# Patient Record
Sex: FEMALE | Race: WHITE | NOT HISPANIC OR LATINO
[De-identification: names, ages, dates, MRNs, and addresses within clinical notes are randomized per-mention and may not be internally consistent; named-entity substitution may affect disease eponyms.]

---

## 2017-05-19 ENCOUNTER — APPOINTMENT (OUTPATIENT)
Dept: INTERNAL MEDICINE | Facility: CLINIC | Age: 67
End: 2017-05-19

## 2017-05-19 VITALS
DIASTOLIC BLOOD PRESSURE: 70 MMHG | BODY MASS INDEX: 26.67 KG/M2 | WEIGHT: 176 LBS | HEART RATE: 70 BPM | SYSTOLIC BLOOD PRESSURE: 134 MMHG | RESPIRATION RATE: 16 BRPM | HEIGHT: 68 IN

## 2017-05-19 DIAGNOSIS — I34.1 NONRHEUMATIC MITRAL (VALVE) PROLAPSE: ICD-10-CM

## 2017-09-19 ENCOUNTER — MEDICATION RENEWAL (OUTPATIENT)
Age: 67
End: 2017-09-19

## 2017-11-28 ENCOUNTER — OTHER (OUTPATIENT)
Age: 67
End: 2017-11-28

## 2018-02-05 ENCOUNTER — APPOINTMENT (OUTPATIENT)
Dept: INTERNAL MEDICINE | Facility: CLINIC | Age: 68
End: 2018-02-05
Payer: MEDICARE

## 2018-02-05 DIAGNOSIS — Z23 ENCOUNTER FOR IMMUNIZATION: ICD-10-CM

## 2018-02-05 PROCEDURE — G0008: CPT

## 2018-02-05 PROCEDURE — 90686 IIV4 VACC NO PRSV 0.5 ML IM: CPT

## 2018-02-22 ENCOUNTER — APPOINTMENT (OUTPATIENT)
Dept: INTERNAL MEDICINE | Facility: CLINIC | Age: 68
End: 2018-02-22

## 2018-02-23 ENCOUNTER — LABORATORY RESULT (OUTPATIENT)
Age: 68
End: 2018-02-23

## 2018-02-23 LAB
ALBUMIN SERPL ELPH-MCNC: 4.6 G/DL
ALP BLD-CCNC: 72 U/L
ALT SERPL-CCNC: 24 U/L
ANION GAP SERPL CALC-SCNC: 13 MMOL/L
AST SERPL-CCNC: 19 U/L
BASOPHILS # BLD AUTO: 0.19 K/UL
BASOPHILS NFR BLD AUTO: 2.6 %
BILIRUB SERPL-MCNC: 0.6 MG/DL
BUN SERPL-MCNC: 13 MG/DL
CALCIUM SERPL-MCNC: 9.8 MG/DL
CHLORIDE SERPL-SCNC: 90 MMOL/L
CHOLEST SERPL-MCNC: 225 MG/DL
CHOLEST/HDLC SERPL: 3.4 RATIO
CO2 SERPL-SCNC: 28 MMOL/L
CREAT SERPL-MCNC: 0.68 MG/DL
EOSINOPHIL # BLD AUTO: 0.13 K/UL
EOSINOPHIL NFR BLD AUTO: 1.8
GLUCOSE SERPL-MCNC: 91 MG/DL
HBA1C MFR BLD HPLC: 5.7 %
HCT VFR BLD CALC: 33.8 %
HDLC SERPL-MCNC: 67 MG/DL
HGB BLD-MCNC: 11 G/DL
LDLC SERPL CALC-MCNC: 137 MG/DL
LYMPHOCYTES # BLD AUTO: 1.28 K/UL
LYMPHOCYTES NFR BLD AUTO: 17.5 %
MAN DIFF?: NORMAL
MCHC RBC-ENTMCNC: 19.3 PG
MCHC RBC-ENTMCNC: 32.5 GM/DL
MCV RBC AUTO: 59.3 FL
MONOCYTES # BLD AUTO: 0.45 K/UL
MONOCYTES NFR BLD AUTO: 6.1 %
NEUTROPHILS # BLD AUTO: 5.25 K/UL
NEUTROPHILS NFR BLD AUTO: 71.9 %
PLATELET # BLD AUTO: 401 K/UL
POTASSIUM SERPL-SCNC: 4.3 MMOL/L
PROT SERPL-MCNC: 7.4 G/DL
RBC # BLD: 5.7 M/UL
RBC # FLD: 15.9 %
SODIUM SERPL-SCNC: 131 MMOL/L
TRIGL SERPL-MCNC: 103 MG/DL
TSH SERPL-ACNC: 2.04 UIU/ML
WBC # FLD AUTO: 7.3 K/UL

## 2018-02-26 LAB — 25(OH)D3 SERPL-MCNC: 44.5 NG/ML

## 2018-03-29 ENCOUNTER — MEDICATION RENEWAL (OUTPATIENT)
Age: 68
End: 2018-03-29

## 2018-04-10 ENCOUNTER — TRANSCRIPTION ENCOUNTER (OUTPATIENT)
Age: 68
End: 2018-04-10

## 2018-06-29 ENCOUNTER — RX RENEWAL (OUTPATIENT)
Age: 68
End: 2018-06-29

## 2018-12-06 ENCOUNTER — APPOINTMENT (OUTPATIENT)
Dept: INTERNAL MEDICINE | Facility: CLINIC | Age: 68
End: 2018-12-06

## 2018-12-31 ENCOUNTER — RX RENEWAL (OUTPATIENT)
Age: 68
End: 2018-12-31

## 2019-01-09 ENCOUNTER — OTHER (OUTPATIENT)
Age: 69
End: 2019-01-09

## 2019-01-31 ENCOUNTER — APPOINTMENT (OUTPATIENT)
Dept: INTERNAL MEDICINE | Facility: CLINIC | Age: 69
End: 2019-01-31
Payer: MEDICARE

## 2019-01-31 VITALS
WEIGHT: 175 LBS | RESPIRATION RATE: 16 BRPM | HEART RATE: 68 BPM | BODY MASS INDEX: 26.52 KG/M2 | HEIGHT: 68 IN | TEMPERATURE: 98.1 F | OXYGEN SATURATION: 99 %

## 2019-01-31 VITALS — SYSTOLIC BLOOD PRESSURE: 142 MMHG | DIASTOLIC BLOOD PRESSURE: 64 MMHG

## 2019-01-31 PROCEDURE — 99497 ADVNCD CARE PLAN 30 MIN: CPT

## 2019-01-31 PROCEDURE — G0439: CPT

## 2019-01-31 NOTE — HEALTH RISK ASSESSMENT
[Very Good] : ~his/her~  mood as very good [No falls in past year] : Patient reported no falls in the past year [Patient reported PAP Smear was normal] : Patient reported PAP Smear was normal [Patient reported bone density results were normal] : Patient reported bone density results were normal [Patient reported colonoscopy was normal] : Patient reported colonoscopy was normal [HIV test declined] : HIV test declined [Hepatitis C test declined] : Hepatitis C test declined [Change in mental status noted] : Change in mental status noted [None] : None [With Family] : lives with family [Retired] : retired [] :  [# Of Children ___] : has [unfilled] children [Sexually Active] : sexually active [High Risk Behavior] : high risk behavior [Feels Safe at Home] : Feels safe at home [Fully functional (bathing, dressing, toileting, transferring, walking, feeding)] : Fully functional (bathing, dressing, toileting, transferring, walking, feeding) [Fully functional (using the telephone, shopping, preparing meals, housekeeping, doing laundry, using] : Fully functional and needs no help or supervision to perform IADLs (using the telephone, shopping, preparing meals, housekeeping, doing laundry, using transportation, managing medications and managing finances) [Smoke Detector] : smoke detector [Carbon Monoxide Detector] : carbon monoxide detector [Safety elements used in home] : safety elements used in home [Seat Belt] :  uses seat belt [Sunscreen] : uses sunscreen [Patient declined discussion] : Patient declined discussion [Designated Healthcare Proxy] : Designated healthcare proxy [Name: ___] : Health Care Proxy's Name: [unfilled]  [Relationship: ___] : Relationship: [unfilled] [Aggressive treatment] : aggressive treatment [FreeTextEntry1] : had a treeible cold recently   [] : No [Language] : denies difficulty with language [Handling Complex Tasks] : denies difficulty handling complex tasks [Reports changes in hearing] : Reports no changes in hearing [Reports changes in vision] : Reports no changes in vision [Reports changes in dental health] : Reports no changes in dental health [Guns at Home] : no guns at home [Travel to Developing Areas] : does not  travel to developing areas [TB Exposure] : is not being exposed to tuberculosis [Caregiver Concerns] : does not have caregiver concerns [PapSmearDate] : 1/3/19 [PapSmearComments] : vainal sonogram done as well [facility] in [place] [BoneDensityDate] : 2014 [ColonoscopyDate] : 2015 [ColonoscopyComments] : SCHEDULED MAY THIS YEAR DR MOSS [FreeTextEntry4] : Goals of care conversation on this visit also discussed situation with patient that in the event they suffer an injury or accident where there is little chance for meaningful life, the patient states that they do NOT want to be maintained in this state ("as a vegetable"), and that there should be NO LIFE SUSTAINING MEASURES in this instance\par This includes example of being brain dead, not be be maintained on mechanical ventilation, and no feeding tubes\par \par Other treatments in cases where there IS a chance for meaningful recovery that were deemed acceptable to the patient include\par -hospializaton for most conditions including treatment for typical community acquired medical conditions such as pneumonia, heart related issues, GI Issues, etc. \par -IV fluids\par -blood transfusions\par -antibiotics\par \par

## 2019-01-31 NOTE — HISTORY OF PRESENT ILLNESS
[FreeTextEntry1] : Medicare annual  [de-identified] : had arecent bad URI, babysitting grandkids, cough sore throat congestion, mild achiness

## 2019-02-07 ENCOUNTER — APPOINTMENT (OUTPATIENT)
Dept: INTERNAL MEDICINE | Facility: CLINIC | Age: 69
End: 2019-02-07
Payer: MEDICARE

## 2019-02-07 PROCEDURE — 90662 IIV NO PRSV INCREASED AG IM: CPT

## 2019-02-07 PROCEDURE — G0008: CPT

## 2019-03-12 ENCOUNTER — TRANSCRIPTION ENCOUNTER (OUTPATIENT)
Age: 69
End: 2019-03-12

## 2019-03-20 ENCOUNTER — TRANSCRIPTION ENCOUNTER (OUTPATIENT)
Age: 69
End: 2019-03-20

## 2019-07-11 ENCOUNTER — RX RENEWAL (OUTPATIENT)
Age: 69
End: 2019-07-11

## 2019-10-21 ENCOUNTER — MEDICATION RENEWAL (OUTPATIENT)
Age: 69
End: 2019-10-21

## 2019-12-01 ENCOUNTER — FORM ENCOUNTER (OUTPATIENT)
Age: 69
End: 2019-12-01

## 2019-12-02 ENCOUNTER — APPOINTMENT (OUTPATIENT)
Dept: INTERNAL MEDICINE | Facility: CLINIC | Age: 69
End: 2019-12-02
Payer: MEDICARE

## 2019-12-02 ENCOUNTER — APPOINTMENT (OUTPATIENT)
Dept: RADIOLOGY | Facility: HOSPITAL | Age: 69
End: 2019-12-02
Payer: MEDICARE

## 2019-12-02 ENCOUNTER — OUTPATIENT (OUTPATIENT)
Dept: OUTPATIENT SERVICES | Facility: HOSPITAL | Age: 69
LOS: 1 days | End: 2019-12-02
Payer: MEDICARE

## 2019-12-02 VITALS
BODY MASS INDEX: 26.22 KG/M2 | HEIGHT: 68 IN | WEIGHT: 173 LBS | OXYGEN SATURATION: 99 % | TEMPERATURE: 97.5 F | HEART RATE: 101 BPM | RESPIRATION RATE: 17 BRPM

## 2019-12-02 VITALS — DIASTOLIC BLOOD PRESSURE: 78 MMHG | SYSTOLIC BLOOD PRESSURE: 136 MMHG

## 2019-12-02 DIAGNOSIS — J20.9 ACUTE BRONCHITIS, UNSPECIFIED: ICD-10-CM

## 2019-12-02 DIAGNOSIS — Z87.09 PERSONAL HISTORY OF OTHER DISEASES OF THE RESPIRATORY SYSTEM: ICD-10-CM

## 2019-12-02 DIAGNOSIS — M19.90 UNSPECIFIED OSTEOARTHRITIS, UNSPECIFIED SITE: ICD-10-CM

## 2019-12-02 PROCEDURE — 71046 X-RAY EXAM CHEST 2 VIEWS: CPT

## 2019-12-02 PROCEDURE — 99214 OFFICE O/P EST MOD 30 MIN: CPT

## 2019-12-02 PROCEDURE — 71046 X-RAY EXAM CHEST 2 VIEWS: CPT | Mod: 26

## 2019-12-02 NOTE — PHYSICAL EXAM
[No Acute Distress] : no acute distress [Well Nourished] : well nourished [Well-Appearing] : well-appearing [Well Developed] : well developed [Normal Sclera/Conjunctiva] : normal sclera/conjunctiva [Normal Outer Ear/Nose] : the outer ears and nose were normal in appearance [PERRL] : pupils equal round and reactive to light [EOMI] : extraocular movements intact [Normal Oropharynx] : the oropharynx was normal [No JVD] : no jugular venous distention [No Lymphadenopathy] : no lymphadenopathy [Supple] : supple [Thyroid Normal, No Nodules] : the thyroid was normal and there were no nodules present [No Respiratory Distress] : no respiratory distress  [No Accessory Muscle Use] : no accessory muscle use [Normal Rate] : normal rate  [Regular Rhythm] : with a regular rhythm [Normal S1, S2] : normal S1 and S2 [No Murmur] : no murmur heard [No Carotid Bruits] : no carotid bruits [No Abdominal Bruit] : a ~M bruit was not heard ~T in the abdomen [No Varicosities] : no varicosities [Pedal Pulses Present] : the pedal pulses are present [No Palpable Aorta] : no palpable aorta [No Edema] : there was no peripheral edema [No Extremity Clubbing/Cyanosis] : no extremity clubbing/cyanosis [Non Tender] : non-tender [Soft] : abdomen soft [Non-distended] : non-distended [No Masses] : no abdominal mass palpated [No HSM] : no HSM [Normal Bowel Sounds] : normal bowel sounds [Normal Posterior Cervical Nodes] : no posterior cervical lymphadenopathy [Normal Anterior Cervical Nodes] : no anterior cervical lymphadenopathy [No Spinal Tenderness] : no spinal tenderness [No CVA Tenderness] : no CVA  tenderness [No Joint Swelling] : no joint swelling [Grossly Normal Strength/Tone] : grossly normal strength/tone [Coordination Grossly Intact] : coordination grossly intact [No Rash] : no rash [Normal Gait] : normal gait [No Focal Deficits] : no focal deficits [Normal Affect] : the affect was normal [Deep Tendon Reflexes (DTR)] : deep tendon reflexes were 2+ and symmetric [Normal Insight/Judgement] : insight and judgment were intact [de-identified] : wheezes right LL

## 2019-12-02 NOTE — HISTORY OF PRESENT ILLNESS
[Moderate] : moderate [___ Weeks ago] :  [unfilled] weeks ago [Episodic] : episodic  [Congestion] : congestion [Cough] : cough [Sore Throat] : sore throat [Wheezing] : wheezing [NSAIDs] : NSAIDs [Worsening] : worsening [Chills] : no chills [Anorexia] : no anorexia [Shortness Of Breath] : no shortness of breath [Fatigue] : not fatigue [Headache] : no headache [Fever] : no fever [FreeTextEntry8] : squeaking at night lying down\par when takes a deep breath congested\par cough was keeping her up at night\par -took robitussin DM some relief\par -using steam \par \par Also asking about knee pain and CBD

## 2020-01-14 ENCOUNTER — APPOINTMENT (OUTPATIENT)
Dept: INTERNAL MEDICINE | Facility: CLINIC | Age: 70
End: 2020-01-14
Payer: MEDICARE

## 2020-01-14 DIAGNOSIS — Z23 ENCOUNTER FOR IMMUNIZATION: ICD-10-CM

## 2020-01-14 PROCEDURE — 90662 IIV NO PRSV INCREASED AG IM: CPT

## 2020-01-14 PROCEDURE — G0008: CPT

## 2020-02-13 ENCOUNTER — RX RENEWAL (OUTPATIENT)
Age: 70
End: 2020-02-13

## 2020-03-23 ENCOUNTER — TRANSCRIPTION ENCOUNTER (OUTPATIENT)
Age: 70
End: 2020-03-23

## 2020-03-23 ENCOUNTER — RX RENEWAL (OUTPATIENT)
Age: 70
End: 2020-03-23

## 2020-04-23 ENCOUNTER — RX RENEWAL (OUTPATIENT)
Age: 70
End: 2020-04-23

## 2020-06-09 ENCOUNTER — NON-APPOINTMENT (OUTPATIENT)
Age: 70
End: 2020-06-09

## 2020-06-09 ENCOUNTER — APPOINTMENT (OUTPATIENT)
Dept: INTERNAL MEDICINE | Facility: CLINIC | Age: 70
End: 2020-06-09
Payer: MEDICARE

## 2020-06-09 VITALS
HEART RATE: 110 BPM | WEIGHT: 174 LBS | OXYGEN SATURATION: 99 % | BODY MASS INDEX: 26.37 KG/M2 | HEIGHT: 68 IN | TEMPERATURE: 98.4 F | RESPIRATION RATE: 17 BRPM

## 2020-06-09 VITALS — DIASTOLIC BLOOD PRESSURE: 66 MMHG | SYSTOLIC BLOOD PRESSURE: 136 MMHG

## 2020-06-09 DIAGNOSIS — Z23 ENCOUNTER FOR IMMUNIZATION: ICD-10-CM

## 2020-06-09 DIAGNOSIS — Z00.00 ENCOUNTER FOR GENERAL ADULT MEDICAL EXAMINATION W/OUT ABNORMAL FINDINGS: ICD-10-CM

## 2020-06-09 DIAGNOSIS — Z71.89 OTHER SPECIFIED COUNSELING: ICD-10-CM

## 2020-06-09 PROCEDURE — G0444 DEPRESSION SCREEN ANNUAL: CPT | Mod: 59

## 2020-06-09 PROCEDURE — 99497 ADVNCD CARE PLAN 30 MIN: CPT | Mod: 33

## 2020-06-09 PROCEDURE — G0009: CPT

## 2020-06-09 PROCEDURE — G0439: CPT

## 2020-06-09 PROCEDURE — 90670 PCV13 VACCINE IM: CPT

## 2020-06-09 RX ORDER — OLMESARTAN MEDOXOMIL AND HYDROCHLOROTHIAZIDE 40; 12.5 MG/1; MG/1
40-12.5 TABLET ORAL
Qty: 90 | Refills: 3 | Status: DISCONTINUED | COMMUNITY
Start: 2020-02-13 | End: 2020-06-09

## 2020-06-09 RX ORDER — AZITHROMYCIN 250 MG/1
250 TABLET, FILM COATED ORAL
Qty: 1 | Refills: 1 | Status: DISCONTINUED | COMMUNITY
Start: 2019-12-02 | End: 2020-06-09

## 2020-06-09 RX ORDER — BENZONATATE 100 MG/1
100 CAPSULE ORAL
Qty: 30 | Refills: 0 | Status: DISCONTINUED | COMMUNITY
Start: 2019-12-02 | End: 2020-06-09

## 2020-06-09 RX ORDER — OLMESARTAN MEDOXOMIL AND HYDROCHLOROTHIAZIDE 40; 12.5 MG/1; MG/1
40-12.5 TABLET ORAL
Qty: 90 | Refills: 0 | Status: DISCONTINUED | COMMUNITY
Start: 2017-03-06 | End: 2020-06-09

## 2020-06-09 NOTE — HEALTH RISK ASSESSMENT
[Very Good] : ~his/her~  mood as very good [No] : No [No falls in past year] : Patient reported no falls in the past year [0] : 2) Feeling down, depressed, or hopeless: Not at all (0) [Patient reported PAP Smear was normal] : Patient reported PAP Smear was normal [Patient reported colonoscopy was normal] : Patient reported colonoscopy was normal [Patient reported mammogram was normal] : Patient reported mammogram was normal [HIV test declined] : HIV test declined [Hepatitis C test declined] : Hepatitis C test declined [None] : None [With Family] : lives with family [High School] : high school [] :  [# Of Children ___] : has [unfilled] children [Sexually Active] : sexually active [Fully functional (bathing, dressing, toileting, transferring, walking, feeding)] : Fully functional (bathing, dressing, toileting, transferring, walking, feeding) [Feels Safe at Home] : Feels safe at home [Fully functional (using the telephone, shopping, preparing meals, housekeeping, doing laundry, using] : Fully functional and needs no help or supervision to perform IADLs (using the telephone, shopping, preparing meals, housekeeping, doing laundry, using transportation, managing medications and managing finances) [Seat Belt] :  uses seat belt [Smoke Detector] : smoke detector [Carbon Monoxide Detector] : carbon monoxide detector [Sunscreen] : uses sunscreen [Reviewed no changes] : Reviewed no changes [Name: ___] : Health Care Proxy's Name: [unfilled]  [Relationship: ___] : Relationship: [unfilled] [Aggressive treatment] : aggressive treatment [I will adhere to the patient's wishes as expressed in the advance directive except as noted below.] : I will adhere to the patient's wishes as expressed in the advance directive except as noted below [] : No [de-identified] : walking  [de-identified] : better during pandemic [Change in mental status noted] : No change in mental status noted [Language] : denies difficulty with language [Behavior] : denies difficulty with behavior [Learning/Retaining New Information] : denies difficulty learning/retaining new information [Handling Complex Tasks] : denies difficulty handling complex tasks [Reasoning] : denies difficulty with reasoning [High Risk Behavior] : no high risk behavior [Spatial Ability and Orientation] : denies difficulty with spatial ability and orientation [Reports changes in hearing] : Reports no changes in hearing [Reports changes in vision] : Reports no changes in vision [Reports changes in dental health] : Reports no changes in dental health [MammogramDate] : 09/2019 [ColonoscopyDate] : 2014 [PapSmearDate] : 1/2019 [FreeTextEntry3] : 4 [FreeTextEntry4] : treatable conditions\par Goals of care conversation on this visit also discussed situation with patient that in the event they suffer an injury or accident where there is little chance for meaningful life, the patient states that they do NOT want to be maintained in this state ("as a vegetable"), and that there should be NO LIFE SUSTAINING MEASURES in this instance\par This includes example of being brain dead, not be be maintained on mechanical ventilation, and no feeding tubes\par \par Other treatments in cases where there IS a chance for meaningful recovery that were deemed acceptable to the patient include\par -hospializaton for most conditions including treatment for typical community acquired medical conditions such as pneumonia, heart related issues, GI Issues, etc. \par -IV fluids\par -blood transfusions\par -antibiotics\par \par

## 2020-06-09 NOTE — PHYSICAL EXAM
[No Acute Distress] : no acute distress [Well Nourished] : well nourished [Well Developed] : well developed [Well-Appearing] : well-appearing [PERRL] : pupils equal round and reactive to light [Normal Sclera/Conjunctiva] : normal sclera/conjunctiva [EOMI] : extraocular movements intact [Normal Outer Ear/Nose] : the outer ears and nose were normal in appearance [No JVD] : no jugular venous distention [Normal Oropharynx] : the oropharynx was normal [No Lymphadenopathy] : no lymphadenopathy [No Respiratory Distress] : no respiratory distress  [Thyroid Normal, No Nodules] : the thyroid was normal and there were no nodules present [Supple] : supple [No Accessory Muscle Use] : no accessory muscle use [Clear to Auscultation] : lungs were clear to auscultation bilaterally [Normal Rate] : normal rate  [Normal S1, S2] : normal S1 and S2 [Regular Rhythm] : with a regular rhythm [No Murmur] : no murmur heard [No Abdominal Bruit] : a ~M bruit was not heard ~T in the abdomen [No Carotid Bruits] : no carotid bruits [No Varicosities] : no varicosities [Pedal Pulses Present] : the pedal pulses are present [No Palpable Aorta] : no palpable aorta [No Edema] : there was no peripheral edema [No Extremity Clubbing/Cyanosis] : no extremity clubbing/cyanosis [Soft] : abdomen soft [Non-distended] : non-distended [Non Tender] : non-tender [No Masses] : no abdominal mass palpated [Normal Bowel Sounds] : normal bowel sounds [No HSM] : no HSM [Normal Posterior Cervical Nodes] : no posterior cervical lymphadenopathy [No Spinal Tenderness] : no spinal tenderness [Normal Anterior Cervical Nodes] : no anterior cervical lymphadenopathy [No CVA Tenderness] : no CVA  tenderness [No Joint Swelling] : no joint swelling [Grossly Normal Strength/Tone] : grossly normal strength/tone [No Rash] : no rash [No Focal Deficits] : no focal deficits [Coordination Grossly Intact] : coordination grossly intact [Deep Tendon Reflexes (DTR)] : deep tendon reflexes were 2+ and symmetric [Normal Gait] : normal gait [Normal Insight/Judgement] : insight and judgment were intact [Normal Affect] : the affect was normal

## 2020-07-06 ENCOUNTER — TRANSCRIPTION ENCOUNTER (OUTPATIENT)
Age: 70
End: 2020-07-06

## 2020-07-06 LAB
25(OH)D3 SERPL-MCNC: 45.6 NG/ML
ALBUMIN SERPL ELPH-MCNC: 4.8 G/DL
ALP BLD-CCNC: 77 U/L
ALT SERPL-CCNC: 19 U/L
ANION GAP SERPL CALC-SCNC: 15 MMOL/L
AST SERPL-CCNC: 15 U/L
BASOPHILS # BLD AUTO: 0.07 K/UL
BASOPHILS NFR BLD AUTO: 0.9 %
BILIRUB SERPL-MCNC: 0.8 MG/DL
BUN SERPL-MCNC: 10 MG/DL
CALCIUM SERPL-MCNC: 9.8 MG/DL
CHLORIDE SERPL-SCNC: 91 MMOL/L
CHOLEST SERPL-MCNC: 215 MG/DL
CHOLEST/HDLC SERPL: 3 RATIO
CO2 SERPL-SCNC: 26 MMOL/L
CREAT SERPL-MCNC: 0.63 MG/DL
EOSINOPHIL # BLD AUTO: 0.2 K/UL
EOSINOPHIL NFR BLD AUTO: 2.7 %
ESTIMATED AVERAGE GLUCOSE: 114 MG/DL
GLUCOSE SERPL-MCNC: 98 MG/DL
HBA1C MFR BLD HPLC: 5.6 %
HCT VFR BLD CALC: 35.9 %
HDLC SERPL-MCNC: 73 MG/DL
HGB BLD-MCNC: 11 G/DL
IMM GRANULOCYTES NFR BLD AUTO: 0.4 %
LDLC SERPL CALC-MCNC: 115 MG/DL
LYMPHOCYTES # BLD AUTO: 1.9 K/UL
LYMPHOCYTES NFR BLD AUTO: 25.7 %
MAN DIFF?: NORMAL
MCHC RBC-ENTMCNC: 19.1 PG
MCHC RBC-ENTMCNC: 30.6 GM/DL
MCV RBC AUTO: 62.2 FL
MONOCYTES # BLD AUTO: 0.51 K/UL
MONOCYTES NFR BLD AUTO: 6.9 %
NEUTROPHILS # BLD AUTO: 4.67 K/UL
NEUTROPHILS NFR BLD AUTO: 63.4 %
PLATELET # BLD AUTO: 515 K/UL
POTASSIUM SERPL-SCNC: 4.8 MMOL/L
PROT SERPL-MCNC: 7.4 G/DL
RBC # BLD: 5.77 M/UL
RBC # FLD: 18.1 %
SODIUM SERPL-SCNC: 132 MMOL/L
TRIGL SERPL-MCNC: 133 MG/DL
TSH SERPL-ACNC: 1.95 UIU/ML
WBC # FLD AUTO: 7.38 K/UL

## 2020-07-07 ENCOUNTER — TRANSCRIPTION ENCOUNTER (OUTPATIENT)
Age: 70
End: 2020-07-07

## 2020-07-10 ENCOUNTER — APPOINTMENT (OUTPATIENT)
Dept: INTERNAL MEDICINE | Facility: CLINIC | Age: 70
End: 2020-07-10
Payer: MEDICARE

## 2020-07-10 DIAGNOSIS — R10.84 GENERALIZED ABDOMINAL PAIN: ICD-10-CM

## 2020-07-10 PROCEDURE — 99442: CPT | Mod: 95

## 2020-07-21 ENCOUNTER — RX RENEWAL (OUTPATIENT)
Age: 70
End: 2020-07-21

## 2020-07-28 ENCOUNTER — APPOINTMENT (OUTPATIENT)
Dept: INTERNAL MEDICINE | Facility: CLINIC | Age: 70
End: 2020-07-28
Payer: MEDICARE

## 2020-07-28 PROCEDURE — 99442: CPT | Mod: 95

## 2020-07-30 ENCOUNTER — TRANSCRIPTION ENCOUNTER (OUTPATIENT)
Age: 70
End: 2020-07-30

## 2020-08-19 ENCOUNTER — TRANSCRIPTION ENCOUNTER (OUTPATIENT)
Age: 70
End: 2020-08-19

## 2020-08-20 ENCOUNTER — TRANSCRIPTION ENCOUNTER (OUTPATIENT)
Age: 70
End: 2020-08-20

## 2020-09-14 ENCOUNTER — TRANSCRIPTION ENCOUNTER (OUTPATIENT)
Age: 70
End: 2020-09-14

## 2020-09-14 ENCOUNTER — APPOINTMENT (OUTPATIENT)
Dept: GASTROENTEROLOGY | Facility: CLINIC | Age: 70
End: 2020-09-14
Payer: MEDICARE

## 2020-09-14 VITALS
SYSTOLIC BLOOD PRESSURE: 180 MMHG | BODY MASS INDEX: 25.01 KG/M2 | HEART RATE: 122 BPM | WEIGHT: 165 LBS | HEIGHT: 68 IN | OXYGEN SATURATION: 99 % | DIASTOLIC BLOOD PRESSURE: 94 MMHG

## 2020-09-14 DIAGNOSIS — K57.90 DIVERTICULOSIS OF INTESTINE, PART UNSPECIFIED, W/OUT PERFORATION OR ABSCESS W/OUT BLEEDING: ICD-10-CM

## 2020-09-14 DIAGNOSIS — R19.7 DIARRHEA, UNSPECIFIED: ICD-10-CM

## 2020-09-14 DIAGNOSIS — K92.1 MELENA: ICD-10-CM

## 2020-09-14 DIAGNOSIS — R15.2 FECAL URGENCY: ICD-10-CM

## 2020-09-14 PROCEDURE — 99204 OFFICE O/P NEW MOD 45 MIN: CPT

## 2020-09-15 ENCOUNTER — APPOINTMENT (OUTPATIENT)
Dept: INTERNAL MEDICINE | Facility: CLINIC | Age: 70
End: 2020-09-15
Payer: MEDICARE

## 2020-09-15 PROCEDURE — 99442: CPT | Mod: 95

## 2020-09-15 NOTE — REASON FOR VISIT
[Initial Evaluation] : an initial evaluation [FreeTextEntry1] : rectal bleeding, diarrhea and abdominal pain

## 2020-09-15 NOTE — ASSESSMENT
[FreeTextEntry1] : Krysten is a pleasant 70 year old female with a recent change in bowel habits and abdominal pain. She has had a distinct disruption of what she describes as previously orderly and healthy GI health. Since April, she has had lower abdominal bloating and cramping pain. She has had a change in the caliber of her stool with bouts of diarrhea. She has also had fecal retention with the urge to defecate without being able to pass stool. Roselyn is distraught over this constellation of symptoms.She clearly needs a colonoscopy to determine the etiology. I agree that her symptoms do not suggest acute diverticulitis especially with the fecal urgency and diarrhea. Furthermore she would've had rapid progression of symptoms without adequate antibiotic therapy. She could, however, has a sigmoid stricture or colitis associated with diverticular disease. Other causes including infiltrative process even malignancy as well as proctitis it needs to be ruled out. Roselyn has many questions today and I took the time to discuss her concerns with her in detail.\par \par I explained to the patient the risks, alternatives and benefits to a colonoscopy. Risk including but not limited to bleeding, perforation, infection adverse medication reaction. Questions were answered. agreeable to proceed with the planned procedures. \par \par

## 2020-09-15 NOTE — HISTORY OF PRESENT ILLNESS
[de-identified] : patient lower abdominal recent change in her bowel habits. She doing quite well and never had issues with her bowel movement up until about 5 months ago. At that time, she developed about a fecal urgency and loose stool. She felt febrile and fatigued during that time period She reports recurrent episodes of this lower abdominal pain and diarrhea most recent episode associated with hematochezia. She denies any changes in her weight. She has not had any sick contacts, travel. She was prescribed a course of Flagyl for one of these episodes off which she took about 5 days. She has had colonoscopies in the past through Dr Marx's office and was advised to see me when these symptoms arose. She has not previously seen a Gastroenterologist. Her mother has perforation due to diverticulitis. She herself was seen to have diverticula on her colonoscopies. she reports that she has been taking added fiber to help with her bowel movements and this has been causing increased abdominal bloating. She reports lower abdominal cramping pain and fecal urgency with these bouts. Takes NSAIDs prn for headaches. no recent changes in diet, medications or other triggers for her above symptoms. Her biggest complaint at this point is the sense of fullness and discomfort in her lower abdomen that is worse with sitting. she also states that this is a complete change in bowel habits for her.

## 2020-09-15 NOTE — REVIEW OF SYSTEMS
[Joint Pain] : joint pain [As Noted in HPI] : as noted in HPI [Feeling Poorly] : feeling poorly [Feeling Tired] : feeling tired [Abdominal Pain] : abdominal pain [Diarrhea] : diarrhea [Negative] : Eyes [FreeTextEntry7] : see HPI [FreeTextEntry3] : seasonal allergies [FreeTextEntry9] : knee and back

## 2020-09-15 NOTE — PHYSICAL EXAM
[No Edema] : No edema [Normal Breath Sounds] : Normal breath sounds [No Rash or Lesion] : No rash or lesion [Alert] : alert [Oriented to Person] : oriented to person [Oriented to Place] : oriented to place [Oriented to Time] : oriented to time [Anxious] : anxious [General Appearance - Alert] : alert [General Appearance - In No Acute Distress] : in no acute distress [Sclera] : the sclera and conjunctiva were normal [PERRL With Normal Accommodation] : pupils were equal in size, round, and reactive to light [Neck Appearance] : the appearance of the neck was normal [Extraocular Movements] : extraocular movements were intact [Jugular Venous Distention Increased] : there was no jugular-venous distention [Thyroid Diffuse Enlargement] : the thyroid was not enlarged [Neck Cervical Mass (___cm)] : no neck mass was observed [Auscultation Breath Sounds / Voice Sounds] : lungs were clear to auscultation bilaterally [Thyroid Nodule] : there were no palpable thyroid nodules [Edema] : there was no peripheral edema [Full Pulse] : the pedal pulses are present [] : no hepato-splenomegaly [Bowel Sounds] : normal bowel sounds [Suprapubic] : in the suprapubic area [RLQ] : in the right lower quadrant [LLQ] : in the left lower quadrant [de-identified] : healthy appearing mature female [de-identified] : soft, non-tender, non-distended [de-identified] : LOMAS +ROM [de-identified] : intact [de-identified] : NC/AT

## 2020-09-18 ENCOUNTER — TRANSCRIPTION ENCOUNTER (OUTPATIENT)
Age: 70
End: 2020-09-18

## 2020-09-18 DIAGNOSIS — Z01.818 ENCOUNTER FOR OTHER PREPROCEDURAL EXAMINATION: ICD-10-CM

## 2020-09-20 ENCOUNTER — APPOINTMENT (OUTPATIENT)
Dept: DISASTER EMERGENCY | Facility: CLINIC | Age: 70
End: 2020-09-20

## 2020-09-21 ENCOUNTER — TRANSCRIPTION ENCOUNTER (OUTPATIENT)
Age: 70
End: 2020-09-21

## 2020-09-21 LAB — SARS-COV-2 N GENE NPH QL NAA+PROBE: NOT DETECTED

## 2020-09-23 ENCOUNTER — APPOINTMENT (OUTPATIENT)
Dept: GASTROENTEROLOGY | Facility: AMBULATORY MEDICAL SERVICES | Age: 70
End: 2020-09-23
Payer: MEDICARE

## 2020-09-23 PROCEDURE — 45380 COLONOSCOPY AND BIOPSY: CPT

## 2020-09-25 ENCOUNTER — TRANSCRIPTION ENCOUNTER (OUTPATIENT)
Age: 70
End: 2020-09-25

## 2020-09-27 ENCOUNTER — NON-APPOINTMENT (OUTPATIENT)
Age: 70
End: 2020-09-27

## 2020-09-28 ENCOUNTER — INPATIENT (INPATIENT)
Facility: HOSPITAL | Age: 70
LOS: 3 days | Discharge: HOME | DRG: 392 | End: 2020-10-02
Attending: SURGERY | Admitting: SURGERY
Payer: MEDICARE

## 2020-09-28 ENCOUNTER — TRANSCRIPTION ENCOUNTER (OUTPATIENT)
Age: 70
End: 2020-09-28

## 2020-09-28 ENCOUNTER — OUTPATIENT (OUTPATIENT)
Dept: OUTPATIENT SERVICES | Facility: HOSPITAL | Age: 70
LOS: 1 days | End: 2020-09-28

## 2020-09-28 ENCOUNTER — APPOINTMENT (OUTPATIENT)
Dept: CT IMAGING | Facility: CLINIC | Age: 70
End: 2020-09-28
Payer: MEDICARE

## 2020-09-28 ENCOUNTER — RESULT REVIEW (OUTPATIENT)
Age: 70
End: 2020-09-28

## 2020-09-28 VITALS
DIASTOLIC BLOOD PRESSURE: 97 MMHG | RESPIRATION RATE: 18 BRPM | HEIGHT: 68 IN | WEIGHT: 164.02 LBS | HEART RATE: 108 BPM | SYSTOLIC BLOOD PRESSURE: 186 MMHG | TEMPERATURE: 98 F

## 2020-09-28 DIAGNOSIS — K56.699 OTHER INTESTINAL OBSTRUCTION UNSPECIFIED AS TO PARTIAL VERSUS COMPLETE OBSTRUCTION: ICD-10-CM

## 2020-09-28 DIAGNOSIS — R10.9 UNSPECIFIED ABDOMINAL PAIN: ICD-10-CM

## 2020-09-28 DIAGNOSIS — K57.90 DIVERTICULOSIS OF INTESTINE, PART UNSPECIFIED, WITHOUT PERFORATION OR ABSCESS WITHOUT BLEEDING: ICD-10-CM

## 2020-09-28 LAB
ALBUMIN SERPL ELPH-MCNC: 4.1 G/DL — SIGNIFICANT CHANGE UP (ref 3.3–5)
ALP SERPL-CCNC: 81 U/L — SIGNIFICANT CHANGE UP (ref 40–120)
ALT FLD-CCNC: 13 U/L — SIGNIFICANT CHANGE UP (ref 10–45)
ANION GAP SERPL CALC-SCNC: 14 MMOL/L — SIGNIFICANT CHANGE UP (ref 5–17)
ANISOCYTOSIS BLD QL: SIGNIFICANT CHANGE UP
APPEARANCE UR: CLEAR — SIGNIFICANT CHANGE UP
APTT BLD: 31.2 SEC — SIGNIFICANT CHANGE UP (ref 27.5–35.5)
AST SERPL-CCNC: 16 U/L — SIGNIFICANT CHANGE UP (ref 10–40)
BASOPHILS # BLD AUTO: 0 K/UL — SIGNIFICANT CHANGE UP (ref 0–0.2)
BASOPHILS NFR BLD AUTO: 0 % — SIGNIFICANT CHANGE UP (ref 0–2)
BILIRUB SERPL-MCNC: 0.5 MG/DL — SIGNIFICANT CHANGE UP (ref 0.2–1.2)
BILIRUB UR-MCNC: NEGATIVE — SIGNIFICANT CHANGE UP
BLD GP AB SCN SERPL QL: NEGATIVE — SIGNIFICANT CHANGE UP
BUN SERPL-MCNC: 5 MG/DL — LOW (ref 7–23)
BURR CELLS BLD QL SMEAR: PRESENT — SIGNIFICANT CHANGE UP
CALCIUM SERPL-MCNC: 9.7 MG/DL — SIGNIFICANT CHANGE UP (ref 8.4–10.5)
CHLORIDE SERPL-SCNC: 88 MMOL/L — LOW (ref 96–108)
CO2 SERPL-SCNC: 26 MMOL/L — SIGNIFICANT CHANGE UP (ref 22–31)
COLOR SPEC: COLORLESS — SIGNIFICANT CHANGE UP
CREAT SERPL-MCNC: 0.56 MG/DL — SIGNIFICANT CHANGE UP (ref 0.5–1.3)
DIFF PNL FLD: NEGATIVE — SIGNIFICANT CHANGE UP
ELLIPTOCYTES BLD QL SMEAR: SLIGHT — SIGNIFICANT CHANGE UP
EOSINOPHIL # BLD AUTO: 0.18 K/UL — SIGNIFICANT CHANGE UP (ref 0–0.5)
EOSINOPHIL NFR BLD AUTO: 0.9 % — SIGNIFICANT CHANGE UP (ref 0–6)
GLUCOSE SERPL-MCNC: 102 MG/DL — HIGH (ref 70–99)
GLUCOSE UR QL: NEGATIVE — SIGNIFICANT CHANGE UP
HCT VFR BLD CALC: 27.5 % — LOW (ref 34.5–45)
HGB BLD-MCNC: 8.8 G/DL — LOW (ref 11.5–15.5)
HYPOCHROMIA BLD QL: SIGNIFICANT CHANGE UP
INR BLD: 1.21 RATIO — HIGH (ref 0.88–1.16)
KETONES UR-MCNC: SIGNIFICANT CHANGE UP
LEUKOCYTE ESTERASE UR-ACNC: NEGATIVE — SIGNIFICANT CHANGE UP
LYMPHOCYTES # BLD AUTO: 1.4 K/UL — SIGNIFICANT CHANGE UP (ref 1–3.3)
LYMPHOCYTES # BLD AUTO: 6.9 % — LOW (ref 13–44)
MACROCYTES BLD QL: SIGNIFICANT CHANGE UP
MANUAL SMEAR VERIFICATION: SIGNIFICANT CHANGE UP
MCHC RBC-ENTMCNC: 18.3 PG — LOW (ref 27–34)
MCHC RBC-ENTMCNC: 32 GM/DL — SIGNIFICANT CHANGE UP (ref 32–36)
MCV RBC AUTO: 57.1 FL — LOW (ref 80–100)
MICROCYTES BLD QL: SIGNIFICANT CHANGE UP
MONOCYTES # BLD AUTO: 0.18 K/UL — SIGNIFICANT CHANGE UP (ref 0–0.9)
MONOCYTES NFR BLD AUTO: 0.9 % — LOW (ref 2–14)
NEUTROPHILS # BLD AUTO: 18.01 K/UL — HIGH (ref 1.8–7.4)
NEUTROPHILS NFR BLD AUTO: 88.7 % — HIGH (ref 43–77)
NITRITE UR-MCNC: NEGATIVE — SIGNIFICANT CHANGE UP
OVALOCYTES BLD QL SMEAR: SLIGHT — SIGNIFICANT CHANGE UP
PH UR: 7.5 — SIGNIFICANT CHANGE UP (ref 5–8)
PLAT MORPH BLD: NORMAL — SIGNIFICANT CHANGE UP
PLATELET # BLD AUTO: 751 K/UL — HIGH (ref 150–400)
POIKILOCYTOSIS BLD QL AUTO: SIGNIFICANT CHANGE UP
POLYCHROMASIA BLD QL SMEAR: SIGNIFICANT CHANGE UP
POTASSIUM SERPL-MCNC: 3.6 MMOL/L — SIGNIFICANT CHANGE UP (ref 3.5–5.3)
POTASSIUM SERPL-SCNC: 3.6 MMOL/L — SIGNIFICANT CHANGE UP (ref 3.5–5.3)
PROT SERPL-MCNC: 7.6 G/DL — SIGNIFICANT CHANGE UP (ref 6–8.3)
PROT UR-MCNC: NEGATIVE — SIGNIFICANT CHANGE UP
PROTHROM AB SERPL-ACNC: 14.3 SEC — HIGH (ref 10.6–13.6)
RBC # BLD: 4.82 M/UL — SIGNIFICANT CHANGE UP (ref 3.8–5.2)
RBC # FLD: 15.9 % — HIGH (ref 10.3–14.5)
RBC BLD AUTO: ABNORMAL
RH IG SCN BLD-IMP: POSITIVE — SIGNIFICANT CHANGE UP
SARS-COV-2 RNA SPEC QL NAA+PROBE: SIGNIFICANT CHANGE UP
SODIUM SERPL-SCNC: 128 MMOL/L — LOW (ref 135–145)
SP GR SPEC: 1.01 — SIGNIFICANT CHANGE UP (ref 1.01–1.02)
SPHEROCYTES BLD QL SMEAR: SLIGHT — SIGNIFICANT CHANGE UP
TARGETS BLD QL SMEAR: SLIGHT — SIGNIFICANT CHANGE UP
UROBILINOGEN FLD QL: NEGATIVE — SIGNIFICANT CHANGE UP
VARIANT LYMPHS # BLD: 2.6 % — SIGNIFICANT CHANGE UP (ref 0–6)
WBC # BLD: 20.3 K/UL — HIGH (ref 3.8–10.5)
WBC # FLD AUTO: 20.3 K/UL — HIGH (ref 3.8–10.5)

## 2020-09-28 PROCEDURE — 71045 X-RAY EXAM CHEST 1 VIEW: CPT | Mod: 26

## 2020-09-28 PROCEDURE — 93010 ELECTROCARDIOGRAM REPORT: CPT | Mod: GC

## 2020-09-28 PROCEDURE — 74177 CT ABD & PELVIS W/CONTRAST: CPT | Mod: 26

## 2020-09-28 PROCEDURE — 99285 EMERGENCY DEPT VISIT HI MDM: CPT | Mod: CS,GC

## 2020-09-28 RX ORDER — SODIUM CHLORIDE 9 MG/ML
1000 INJECTION INTRAMUSCULAR; INTRAVENOUS; SUBCUTANEOUS ONCE
Refills: 0 | Status: COMPLETED | OUTPATIENT
Start: 2020-09-28 | End: 2020-09-28

## 2020-09-28 RX ORDER — PIPERACILLIN AND TAZOBACTAM 4; .5 G/20ML; G/20ML
3.38 INJECTION, POWDER, LYOPHILIZED, FOR SOLUTION INTRAVENOUS EVERY 8 HOURS
Refills: 0 | Status: DISCONTINUED | OUTPATIENT
Start: 2020-09-28 | End: 2020-10-02

## 2020-09-28 RX ORDER — ASPIRIN/CALCIUM CARB/MAGNESIUM 324 MG
81 TABLET ORAL DAILY
Refills: 0 | Status: DISCONTINUED | OUTPATIENT
Start: 2020-09-28 | End: 2020-10-02

## 2020-09-28 RX ORDER — LOSARTAN POTASSIUM 100 MG/1
100 TABLET, FILM COATED ORAL DAILY
Refills: 0 | Status: DISCONTINUED | OUTPATIENT
Start: 2020-09-28 | End: 2020-10-02

## 2020-09-28 RX ORDER — ENOXAPARIN SODIUM 100 MG/ML
40 INJECTION SUBCUTANEOUS DAILY
Refills: 0 | Status: DISCONTINUED | OUTPATIENT
Start: 2020-09-28 | End: 2020-10-02

## 2020-09-28 RX ORDER — PIPERACILLIN AND TAZOBACTAM 4; .5 G/20ML; G/20ML
3.38 INJECTION, POWDER, LYOPHILIZED, FOR SOLUTION INTRAVENOUS ONCE
Refills: 0 | Status: COMPLETED | OUTPATIENT
Start: 2020-09-28 | End: 2020-09-28

## 2020-09-28 RX ORDER — HYDROCHLOROTHIAZIDE 25 MG
12.5 TABLET ORAL DAILY
Refills: 0 | Status: DISCONTINUED | OUTPATIENT
Start: 2020-09-28 | End: 2020-10-02

## 2020-09-28 RX ORDER — SODIUM CHLORIDE 9 MG/ML
1000 INJECTION INTRAMUSCULAR; INTRAVENOUS; SUBCUTANEOUS
Refills: 0 | Status: DISCONTINUED | OUTPATIENT
Start: 2020-09-28 | End: 2020-09-29

## 2020-09-28 RX ORDER — INFLUENZA VIRUS VACCINE 15; 15; 15; 15 UG/.5ML; UG/.5ML; UG/.5ML; UG/.5ML
0.5 SUSPENSION INTRAMUSCULAR ONCE
Refills: 0 | Status: DISCONTINUED | OUTPATIENT
Start: 2020-09-28 | End: 2020-10-02

## 2020-09-28 RX ADMIN — SODIUM CHLORIDE 1000 MILLILITER(S): 9 INJECTION INTRAMUSCULAR; INTRAVENOUS; SUBCUTANEOUS at 18:44

## 2020-09-28 RX ADMIN — SODIUM CHLORIDE 120 MILLILITER(S): 9 INJECTION INTRAMUSCULAR; INTRAVENOUS; SUBCUTANEOUS at 22:43

## 2020-09-28 RX ADMIN — PIPERACILLIN AND TAZOBACTAM 200 GRAM(S): 4; .5 INJECTION, POWDER, LYOPHILIZED, FOR SOLUTION INTRAVENOUS at 19:45

## 2020-09-28 NOTE — ED ADULT NURSE NOTE - OBJECTIVE STATEMENT
71 yo F w/ PMHx of lumpectomy on R, endometriosis, HTN, MVP presents to ED via waiting room c/o abdominal pain. Pt reports abdominal pain began several months ago, was unable to see MD for symptoms until August. Went this past week for colonoscopy which showed sigmoid stricture, today went for abd CT which showed inflammatory mass. Pt was referred to ED for further eval and possible surgical consult. Pt denies any CP, SOB, cough, N/V, fever, chills, urinary complaints, constipation, diarrhea, HA, dizziness, weakness. Pt A&Ox4, lungs CTA, +central pulses. Abdomen soft, not tender, not distended. Ambulating w/ steady gait, safety and comfort maintained, no acute distress noted at this time. Pt denies any recent travel or known sick contacts.

## 2020-09-28 NOTE — ED PROVIDER NOTE - PROGRESS NOTE DETAILS
Discussed case with Dr. Fernandez, agrees with plan for surgical consultation and admission, requesting house GI be consulted as well.  Tom Can M.D. Ronel Cox MD PGY-3 Pt signed out to me. TBA dr. swain

## 2020-09-28 NOTE — H&P ADULT - NSICDXPASTMEDICALHX_GEN_ALL_CORE_FT
PAST MEDICAL HISTORY:  Diverticulitis     MVP (mitral valve prolapse) diagnosed 30 years ago     Term Osteen Vaginal Delivery (>/= 37 weeks)

## 2020-09-28 NOTE — ED PROVIDER NOTE - ATTENDING CONTRIBUTION TO CARE
Patient with history of diverticulitis presenting with LLQ abdominal pain.  Symptoms on going for months but worsened over last 1-2 weeks.  Saw outpatient GI Dr Fernandez who did colonoscopy and noted sigmoid stricture and inflammation, ordered CT to further evaluate which was performed today and noted "No bowel obstruction. Appendix normal. Extensive pelvic inflammatory mass involving the sigmoid colon with extension to the adjacent uterus. Probable extraluminal gas bubbles. 3.8 x 2.7 cm right-sided retrouterine collection suspicious for abscess with differential diagnosis of adnexal cyst".  Patient reporting associated change in bowel habits/stool consistency and pressure with urination.  Denying fevers, chills, chest pains, shortness of breath, headaches, dizziness.    A 14 point review of systems is negative except as in HPI or otherwise documented.    Exam:  General: Patient well appearing, vital signs within normal limits  HEENT: airway patent with moist mucous membranes  Cardiac: RRR S1/S2 with strong peripheral pulses  Respiratory: lungs clear without respiratory distress  GI: abdomen soft, tender to palpation in LLQ/suprapubic area without rebound/guarding, non distended  Neuro: no gross neurologic deficits  Skin: warm, well perfused  Psych: normal mood and affect    Overall suspect possible chronic diverticulitis with abscess? - last antibiotics oral flagyl 3 months ago.  Will obtain preop labs, discuss with her GI physician, consult colorectal surgery, likely antibiotics, admission for further management.  CT imaging reviewed by myself, location of collection not likely to be accessable for IR for drainage, however will also discuss this with surgery.

## 2020-09-28 NOTE — H&P ADULT - HISTORY OF PRESENT ILLNESS
70 year old female with PMH HTN, MVP (diagnosed 30 years ago during pregnancy without follow up), thalassemia minor, diverticulosis, who presented with abdominal pain and found to have mass near the sigmoid colon. Patient reports abdominal camps, constipation and diarrhea, low grade fever in April that resolved after empiric treament with antibiotics. Symptoms began again August with worsening abdominal pain, diarrhea/soft stool, and one episode of bloody mucous on stool. 70 year old female with PMH HTN, MVP (diagnosed 30 years ago during pregnancy without follow up), thalassemia minor, diverticulosis, who presented with abdominal pain and found to have mass near the sigmoid colon. Patient reports abdominal camps, constipation and diarrhea, low grade fever in April that resolved after empiric treament with antibiotics. Symptoms began again August with worsening abdominal pain, diarrhea/soft stool, and one episode of bloody mucous on stool. Patient received a Colonoscopy outpatient with Dr. Fernandez on 9/23 who saw sigmoid structure and extrinsic compression and sent the patient in today for CT. Patient currently denies nausea, vomiting, chills, SOB, CP.

## 2020-09-28 NOTE — ED PROVIDER NOTE - PHYSICAL EXAMINATION
General appearance: NAD, conversant, afebrile    Neck: Trachea midline; Full range of motion, supple   Pulm: CTA bl, normal respiratory effort and no intercostal retractions, normal work of breathing   CV: RRR, No murmurs, rubs, or gallops   Abdomen: Soft, tender LLQ, non-distended; no masses or hepatosplenomegaly.   Extremities: No peripheral edema    Skin: Dry, normal temperature, turgor and texture; no rash   Psych: Appropriate affect, cooperative; alert and oriented to person, place and time

## 2020-09-28 NOTE — ED ADULT NURSE NOTE - CHPI ED NUR SYMPTOMS NEG
no dysuria/no abdominal distension/no blood in stool/no chills/no burning urination/no vomiting/no fever/no hematuria/no nausea/no diarrhea

## 2020-09-28 NOTE — H&P ADULT - NSHPPHYSICALEXAM_GEN_ALL_CORE
General: well developed, well nourished, NAD  Neuro: alert and oriented, no focal deficits, moves all extremities spontaneously  HEENT: NCAT, no lymphadenopathy  Respiratory: airway patent, respirations unlabored  CVS: regular rate and rhythm  Abdomen: soft, LLQ tenderness, nondistended  Extremities: no edema, sensation and movement grossly intact  Skin: warm, dry, appropriate color

## 2020-09-28 NOTE — H&P ADULT - ASSESSMENT
70 year old female with PMH HTN, MVP (diagnosed 30 years ago during pregnancy without follow up), thalassemia minor, diverticulosis, who presented with abdominal pain and found to have a mass near the sigmoid colon inflammatory mass 2/2 chronic diverticulitis vs adnexal mass.     Plan:  - Admit to Dr. Marx  - Continue with zosyn  - NPO/ IVF  - Lovenox for DVT ppx  - Plan discussed with Fellow Dr. Miladys Davis MD  General Surgery, PGY 2   70 year old female with PMH HTN, MVP (diagnosed 30 years ago during pregnancy without follow up), thalassemia minor, diverticulosis, who presented with abdominal pain and found to have a mass near the sigmoid colon inflammatory mass 2/2 chronic diverticulitis vs adnexal mass.     Plan:  - Admit to Dr. Marx  - Continue with zosyn  - NPO/ IVF  - Lovenox for DVT ppx  - Pelvic us  - f/u [facility],   - Plan discussed with Fellow Dr. Miladys Davis MD  General Surgery, PGY 2

## 2020-09-28 NOTE — H&P ADULT - NSICDXPASTSURGICALHX_GEN_ALL_CORE_FT
PAST SURGICAL HISTORY:  No significant past surgical history      PAST SURGICAL HISTORY:  History of unilateral salpingectomy left    Previous  section     S/P endometrial ablation

## 2020-09-28 NOTE — ED PROVIDER NOTE - CLINICAL SUMMARY MEDICAL DECISION MAKING FREE TEXT BOX
71yo female with pmh diverticulitis presenting with abdominal pain.  CTAP outpatient with inflammatory mass near sigmoid colon concerning for diverticulitis complication.  Will get labs, start antibiotics if labs abnormal. Will discuss with GI, surg.  Fluids.  Plan for admission, surg vs med.

## 2020-09-28 NOTE — ED PROVIDER NOTE - OBJECTIVE STATEMENT
69yo female with pmh diverticulitis presenting with abdominal pain.  Patient had ctap with outpatient GI demonstrating inflammatory mass near sigmoid colon.  Notes this pain has been going on for months, had held off work up 2/2 pandemic.  No fevers, nausea, vomiting, urinary symptoms.  Appetite ok.  Admits to diarrhea.    GI Belkis Chesty

## 2020-09-29 DIAGNOSIS — Z90.79 ACQUIRED ABSENCE OF OTHER GENITAL ORGAN(S): Chronic | ICD-10-CM

## 2020-09-29 DIAGNOSIS — Z98.890 OTHER SPECIFIED POSTPROCEDURAL STATES: Chronic | ICD-10-CM

## 2020-09-29 DIAGNOSIS — Z98.891 HISTORY OF UTERINE SCAR FROM PREVIOUS SURGERY: Chronic | ICD-10-CM

## 2020-09-29 LAB
ANION GAP SERPL CALC-SCNC: 10 MMOL/L — SIGNIFICANT CHANGE UP (ref 5–17)
BUN SERPL-MCNC: 5 MG/DL — LOW (ref 7–23)
CALCIUM SERPL-MCNC: 9.5 MG/DL — SIGNIFICANT CHANGE UP (ref 8.4–10.5)
CANCER AG125 SERPL-ACNC: 14 U/ML — SIGNIFICANT CHANGE UP
CEA SERPL-MCNC: 3.3 NG/ML — SIGNIFICANT CHANGE UP (ref 0–3.8)
CHLORIDE SERPL-SCNC: 97 MMOL/L — SIGNIFICANT CHANGE UP (ref 96–108)
CO2 SERPL-SCNC: 27 MMOL/L — SIGNIFICANT CHANGE UP (ref 22–31)
CREAT SERPL-MCNC: 0.56 MG/DL — SIGNIFICANT CHANGE UP (ref 0.5–1.3)
CULTURE RESULTS: SIGNIFICANT CHANGE UP
GLUCOSE SERPL-MCNC: 105 MG/DL — HIGH (ref 70–99)
HCT VFR BLD CALC: 24.4 % — LOW (ref 34.5–45)
HCV AB S/CO SERPL IA: 0.16 S/CO — SIGNIFICANT CHANGE UP (ref 0–0.99)
HCV AB SERPL-IMP: SIGNIFICANT CHANGE UP
HGB BLD-MCNC: 7.5 G/DL — LOW (ref 11.5–15.5)
MAGNESIUM SERPL-MCNC: 1.7 MG/DL — SIGNIFICANT CHANGE UP (ref 1.6–2.6)
MCHC RBC-ENTMCNC: 17.7 PG — LOW (ref 27–34)
MCHC RBC-ENTMCNC: 30.7 GM/DL — LOW (ref 32–36)
MCV RBC AUTO: 57.7 FL — LOW (ref 80–100)
NRBC # BLD: 0 /100 WBCS — SIGNIFICANT CHANGE UP (ref 0–0)
PHOSPHATE SERPL-MCNC: 3.5 MG/DL — SIGNIFICANT CHANGE UP (ref 2.5–4.5)
PLATELET # BLD AUTO: 648 K/UL — HIGH (ref 150–400)
POTASSIUM SERPL-MCNC: 4 MMOL/L — SIGNIFICANT CHANGE UP (ref 3.5–5.3)
POTASSIUM SERPL-SCNC: 4 MMOL/L — SIGNIFICANT CHANGE UP (ref 3.5–5.3)
RBC # BLD: 4.23 M/UL — SIGNIFICANT CHANGE UP (ref 3.8–5.2)
RBC # FLD: 15.9 % — HIGH (ref 10.3–14.5)
SODIUM SERPL-SCNC: 134 MMOL/L — LOW (ref 135–145)
SPECIMEN SOURCE: SIGNIFICANT CHANGE UP
WBC # BLD: 14.36 K/UL — HIGH (ref 3.8–10.5)
WBC # FLD AUTO: 14.36 K/UL — HIGH (ref 3.8–10.5)

## 2020-09-29 PROCEDURE — 76856 US EXAM PELVIC COMPLETE: CPT | Mod: 26

## 2020-09-29 RX ORDER — DEXTROSE MONOHYDRATE, SODIUM CHLORIDE, AND POTASSIUM CHLORIDE 50; .745; 4.5 G/1000ML; G/1000ML; G/1000ML
1000 INJECTION, SOLUTION INTRAVENOUS
Refills: 0 | Status: DISCONTINUED | OUTPATIENT
Start: 2020-09-29 | End: 2020-09-30

## 2020-09-29 RX ORDER — SODIUM CHLORIDE 9 MG/ML
500 INJECTION, SOLUTION INTRAVENOUS ONCE
Refills: 0 | Status: COMPLETED | OUTPATIENT
Start: 2020-09-29 | End: 2020-09-29

## 2020-09-29 RX ORDER — ACETAMINOPHEN 500 MG
1000 TABLET ORAL ONCE
Refills: 0 | Status: COMPLETED | OUTPATIENT
Start: 2020-09-29 | End: 2020-09-29

## 2020-09-29 RX ADMIN — SODIUM CHLORIDE 500 MILLILITER(S): 9 INJECTION, SOLUTION INTRAVENOUS at 17:00

## 2020-09-29 RX ADMIN — PIPERACILLIN AND TAZOBACTAM 25 GRAM(S): 4; .5 INJECTION, POWDER, LYOPHILIZED, FOR SOLUTION INTRAVENOUS at 02:24

## 2020-09-29 RX ADMIN — Medication 81 MILLIGRAM(S): at 14:00

## 2020-09-29 RX ADMIN — Medication 1000 MILLIGRAM(S): at 15:27

## 2020-09-29 RX ADMIN — Medication 400 MILLIGRAM(S): at 14:57

## 2020-09-29 RX ADMIN — ENOXAPARIN SODIUM 40 MILLIGRAM(S): 100 INJECTION SUBCUTANEOUS at 14:00

## 2020-09-29 RX ADMIN — PIPERACILLIN AND TAZOBACTAM 25 GRAM(S): 4; .5 INJECTION, POWDER, LYOPHILIZED, FOR SOLUTION INTRAVENOUS at 14:00

## 2020-09-29 RX ADMIN — PIPERACILLIN AND TAZOBACTAM 25 GRAM(S): 4; .5 INJECTION, POWDER, LYOPHILIZED, FOR SOLUTION INTRAVENOUS at 21:05

## 2020-09-29 NOTE — PROVIDER CONTACT NOTE (OTHER) - SITUATION
Pt c/o of right hand swelling and pain. PIV is not in hand and only in AC. pt states she was dangling hand when swelling happened.

## 2020-09-29 NOTE — PROGRESS NOTE ADULT - ASSESSMENT
71 yo female with inflammatory pelvic mass  - cont ABX   - follow up pelvic us  - f/u tumor markers     Red Sx 900

## 2020-09-29 NOTE — PROGRESS NOTE ADULT - SUBJECTIVE AND OBJECTIVE BOX
Red surgery Progress Note     Subjective: Feeling better since admission, abd pain/cramps improved, no n/v     PE: NAD AAOX3  abd soft nontender, nondistended     Vital Signs Last 24 Hrs  T(C): 36.8 (29 Sep 2020 05:40), Max: 37.1 (28 Sep 2020 17:33)  T(F): 98.2 (29 Sep 2020 05:40), Max: 98.7 (28 Sep 2020 17:33)  HR: 92 (29 Sep 2020 05:40) (90 - 108)  BP: 132/82 (29 Sep 2020 05:40) (132/82 - 186/97)  BP(mean): --  RR: 18 (29 Sep 2020 05:40) (16 - 18)  SpO2: 100% (29 Sep 2020 05:40) (97% - 100%)    I&O's Detail    28 Sep 2020 07:01  -  29 Sep 2020 07:00  --------------------------------------------------------  IN:  Total IN: 0 mL    OUT:    Voided (mL): 400 mL  Total OUT: 400 mL    Total NET: -400 mL          Daily Height in cm: 172.72 (28 Sep 2020 15:51)    Daily     MEDICATIONS  (STANDING):  aspirin  chewable 81 milliGRAM(s) Oral daily  enoxaparin Injectable 40 milliGRAM(s) SubCutaneous daily  hydrochlorothiazide 12.5 milliGRAM(s) Oral daily  influenza   Vaccine 0.5 milliLiter(s) IntraMuscular once  losartan 100 milliGRAM(s) Oral daily  piperacillin/tazobactam IVPB.. 3.375 Gram(s) IV Intermittent every 8 hours  sodium chloride 0.9%. 1000 milliLiter(s) (120 mL/Hr) IV Continuous <Continuous>    MEDICATIONS  (PRN):      LABS:                        7.5    14.36 )-----------( 648      ( 29 Sep 2020 07:20 )             24.4     09-28    128<L>  |  88<L>  |  5<L>  ----------------------------<  102<H>  3.6   |  26  |  0.56    Ca    9.7      28 Sep 2020 18:48    TPro  7.6  /  Alb  4.1  /  TBili  0.5  /  DBili  x   /  AST  16  /  ALT  13  /  AlkPhos  81  09-    PT/INR - ( 28 Sep 2020 18:48 )   PT: 14.3 sec;   INR: 1.21 ratio         PTT - ( 28 Sep 2020 18:48 )  PTT:31.2 sec  Urinalysis Basic - ( 28 Sep 2020 20:58 )    Color: Colorless / Appearance: Clear / S.010 / pH: x  Gluc: x / Ketone: Trace  / Bili: Negative / Urobili: Negative   Blood: x / Protein: Negative / Nitrite: Negative   Leuk Esterase: Negative / RBC: x / WBC x   Sq Epi: x / Non Sq Epi: x / Bacteria: x

## 2020-09-30 LAB
ANION GAP SERPL CALC-SCNC: 9 MMOL/L — SIGNIFICANT CHANGE UP (ref 5–17)
BUN SERPL-MCNC: <4 MG/DL — LOW (ref 7–23)
CALCIUM SERPL-MCNC: 8.9 MG/DL — SIGNIFICANT CHANGE UP (ref 8.4–10.5)
CHLORIDE SERPL-SCNC: 96 MMOL/L — SIGNIFICANT CHANGE UP (ref 96–108)
CO2 SERPL-SCNC: 26 MMOL/L — SIGNIFICANT CHANGE UP (ref 22–31)
CREAT SERPL-MCNC: 0.57 MG/DL — SIGNIFICANT CHANGE UP (ref 0.5–1.3)
GLUCOSE SERPL-MCNC: 112 MG/DL — HIGH (ref 70–99)
HCT VFR BLD CALC: 22.6 % — LOW (ref 34.5–45)
HGB BLD-MCNC: 7.2 G/DL — LOW (ref 11.5–15.5)
MAGNESIUM SERPL-MCNC: 1.5 MG/DL — LOW (ref 1.6–2.6)
MCHC RBC-ENTMCNC: 18.3 PG — LOW (ref 27–34)
MCHC RBC-ENTMCNC: 31.9 GM/DL — LOW (ref 32–36)
MCV RBC AUTO: 57.4 FL — LOW (ref 80–100)
NRBC # BLD: 0 /100 WBCS — SIGNIFICANT CHANGE UP (ref 0–0)
PHOSPHATE SERPL-MCNC: 2.7 MG/DL — SIGNIFICANT CHANGE UP (ref 2.5–4.5)
PLATELET # BLD AUTO: 626 K/UL — HIGH (ref 150–400)
POTASSIUM SERPL-MCNC: 3.8 MMOL/L — SIGNIFICANT CHANGE UP (ref 3.5–5.3)
POTASSIUM SERPL-SCNC: 3.8 MMOL/L — SIGNIFICANT CHANGE UP (ref 3.5–5.3)
RBC # BLD: 3.94 M/UL — SIGNIFICANT CHANGE UP (ref 3.8–5.2)
RBC # FLD: 15.8 % — HIGH (ref 10.3–14.5)
SODIUM SERPL-SCNC: 131 MMOL/L — LOW (ref 135–145)
WBC # BLD: 14.05 K/UL — HIGH (ref 3.8–10.5)
WBC # FLD AUTO: 14.05 K/UL — HIGH (ref 3.8–10.5)

## 2020-09-30 RX ORDER — MAGNESIUM SULFATE 500 MG/ML
2 VIAL (ML) INJECTION ONCE
Refills: 0 | Status: COMPLETED | OUTPATIENT
Start: 2020-09-30 | End: 2020-09-30

## 2020-09-30 RX ORDER — POTASSIUM CHLORIDE 20 MEQ
20 PACKET (EA) ORAL ONCE
Refills: 0 | Status: COMPLETED | OUTPATIENT
Start: 2020-09-30 | End: 2020-09-30

## 2020-09-30 RX ORDER — IBUPROFEN 200 MG
400 TABLET ORAL EVERY 6 HOURS
Refills: 0 | Status: DISCONTINUED | OUTPATIENT
Start: 2020-09-30 | End: 2020-10-02

## 2020-09-30 RX ORDER — DEXTROSE MONOHYDRATE, SODIUM CHLORIDE, AND POTASSIUM CHLORIDE 50; .745; 4.5 G/1000ML; G/1000ML; G/1000ML
1000 INJECTION, SOLUTION INTRAVENOUS
Refills: 0 | Status: DISCONTINUED | OUTPATIENT
Start: 2020-09-30 | End: 2020-09-30

## 2020-09-30 RX ADMIN — Medication 400 MILLIGRAM(S): at 20:25

## 2020-09-30 RX ADMIN — Medication 400 MILLIGRAM(S): at 11:58

## 2020-09-30 RX ADMIN — PIPERACILLIN AND TAZOBACTAM 25 GRAM(S): 4; .5 INJECTION, POWDER, LYOPHILIZED, FOR SOLUTION INTRAVENOUS at 21:01

## 2020-09-30 RX ADMIN — Medication 12.5 MILLIGRAM(S): at 05:16

## 2020-09-30 RX ADMIN — LOSARTAN POTASSIUM 100 MILLIGRAM(S): 100 TABLET, FILM COATED ORAL at 05:17

## 2020-09-30 RX ADMIN — Medication 20 MILLIEQUIVALENT(S): at 11:27

## 2020-09-30 RX ADMIN — DEXTROSE MONOHYDRATE, SODIUM CHLORIDE, AND POTASSIUM CHLORIDE 100 MILLILITER(S): 50; .745; 4.5 INJECTION, SOLUTION INTRAVENOUS at 04:21

## 2020-09-30 RX ADMIN — Medication 400 MILLIGRAM(S): at 11:28

## 2020-09-30 RX ADMIN — PIPERACILLIN AND TAZOBACTAM 25 GRAM(S): 4; .5 INJECTION, POWDER, LYOPHILIZED, FOR SOLUTION INTRAVENOUS at 05:16

## 2020-09-30 RX ADMIN — PIPERACILLIN AND TAZOBACTAM 25 GRAM(S): 4; .5 INJECTION, POWDER, LYOPHILIZED, FOR SOLUTION INTRAVENOUS at 14:05

## 2020-09-30 RX ADMIN — Medication 50 GRAM(S): at 11:28

## 2020-09-30 RX ADMIN — Medication 81 MILLIGRAM(S): at 11:35

## 2020-09-30 RX ADMIN — ENOXAPARIN SODIUM 40 MILLIGRAM(S): 100 INJECTION SUBCUTANEOUS at 11:35

## 2020-09-30 RX ADMIN — Medication 400 MILLIGRAM(S): at 20:55

## 2020-09-30 NOTE — PROVIDER CONTACT NOTE (OTHER) - SITUATION
pt c/o left wrist/hand pain and swelling. pt states she does not have hx of arthritis. IV tylenol didn't help

## 2020-09-30 NOTE — PROGRESS NOTE ADULT - ASSESSMENT
70 year old female with PMH HTN, MVP (diagnosed 30 years ago during pregnancy without follow up), thalassemia minor, diverticulosis, who presented with abdominal pain and found to have a mass near the sigmoid colon inflammatory mass 2/2 chronic diverticulitis vs adnexal mass.     Plan:  - CLD  - Continue with zosyn  - Lovenox for DVT ppx  - CEA,  wnl   - F/up AM labs     Red [facility] 8400

## 2020-09-30 NOTE — PROGRESS NOTE ADULT - SUBJECTIVE AND OBJECTIVE BOX
Interval events: CEA and  wnl  Pelvic US 3.8 x 2.7 x 3.1 cm abscess posterior to the uterus with possible fistulous communication to the sigmoid colon.      SUBJECTIVE:  Reports pain well controlled  Denies nausea, vomiting  Ambulating independently    OBJECTIVE:  Vital Signs Last 24 Hrs  T(C): 36.5 (30 Sep 2020 08:15), Max: 37.4 (29 Sep 2020 09:09)  T(F): 97.7 (30 Sep 2020 08:15), Max: 99.3 (29 Sep 2020 09:09)  HR: 99 (30 Sep 2020 08:15) (85 - 101)  BP: 160/80 (30 Sep 2020 08:15) (134/81 - 160/80)  BP(mean): --  RR: 17 (30 Sep 2020 08:15) (16 - 18)  SpO2: 98% (30 Sep 2020 08:15) (96% - 100%)    Physical Examination:  GEN: NAD, resting quietly  NEURO: AAOx3, CN II-XII grossly intact, no focal deficits  PULM: symmetric chest rise bilaterally, no increased WOB  ABD: soft, Tender LLQ, nondistended  EXTR: no LE erythema, moving all extremities      LABS:                        7.2    14.05 )-----------( 626      ( 30 Sep 2020 06:49 )             22.6       09-30    131<L>  |  96  |  <4<L>  ----------------------------<  112<H>  3.8   |  26  |  0.57    Ca    8.9      30 Sep 2020 06:48  Phos  2.7     09-30  Mg     1.5     09-30    TPro  7.6  /  Alb  4.1  /  TBili  0.5  /  DBili  x   /  AST  16  /  ALT  13  /  AlkPhos  81  09-28        IMAGING:  []    A/P:

## 2020-09-30 NOTE — PROVIDER CONTACT NOTE (OTHER) - SITUATION
Pt still complains of left wrist/hand pain associated with swelling. Md has not come to bedside. ibuprofen given with some relief

## 2020-09-30 NOTE — PROVIDER CONTACT NOTE (OTHER) - ACTION/TREATMENT ORDERED:
IV tylenol ordered. Continue to monitor.
Asked MD Garland to come to bedside to look at pts wrist/hand. Continue to monitor.
PA aware. Continue to monitor.

## 2020-10-01 LAB
ANION GAP SERPL CALC-SCNC: 11 MMOL/L — SIGNIFICANT CHANGE UP (ref 5–17)
BUN SERPL-MCNC: <4 MG/DL — LOW (ref 7–23)
CALCIUM SERPL-MCNC: 9.1 MG/DL — SIGNIFICANT CHANGE UP (ref 8.4–10.5)
CHLORIDE SERPL-SCNC: 97 MMOL/L — SIGNIFICANT CHANGE UP (ref 96–108)
CO2 SERPL-SCNC: 25 MMOL/L — SIGNIFICANT CHANGE UP (ref 22–31)
CREAT SERPL-MCNC: 0.54 MG/DL — SIGNIFICANT CHANGE UP (ref 0.5–1.3)
GLUCOSE SERPL-MCNC: 82 MG/DL — SIGNIFICANT CHANGE UP (ref 70–99)
HCT VFR BLD CALC: 22.7 % — LOW (ref 34.5–45)
HGB BLD-MCNC: 7.1 G/DL — LOW (ref 11.5–15.5)
MAGNESIUM SERPL-MCNC: 2 MG/DL — SIGNIFICANT CHANGE UP (ref 1.6–2.6)
MCHC RBC-ENTMCNC: 18 PG — LOW (ref 27–34)
MCHC RBC-ENTMCNC: 31.3 GM/DL — LOW (ref 32–36)
MCV RBC AUTO: 57.5 FL — LOW (ref 80–100)
NRBC # BLD: 0 /100 WBCS — SIGNIFICANT CHANGE UP (ref 0–0)
PHOSPHATE SERPL-MCNC: 4 MG/DL — SIGNIFICANT CHANGE UP (ref 2.5–4.5)
PLATELET # BLD AUTO: 614 K/UL — HIGH (ref 150–400)
POTASSIUM SERPL-MCNC: 4.7 MMOL/L — SIGNIFICANT CHANGE UP (ref 3.5–5.3)
POTASSIUM SERPL-SCNC: 4.7 MMOL/L — SIGNIFICANT CHANGE UP (ref 3.5–5.3)
RBC # BLD: 3.95 M/UL — SIGNIFICANT CHANGE UP (ref 3.8–5.2)
RBC # FLD: 16 % — HIGH (ref 10.3–14.5)
SODIUM SERPL-SCNC: 133 MMOL/L — LOW (ref 135–145)
WBC # BLD: 9.91 K/UL — SIGNIFICANT CHANGE UP (ref 3.8–10.5)
WBC # FLD AUTO: 9.91 K/UL — SIGNIFICANT CHANGE UP (ref 3.8–10.5)

## 2020-10-01 RX ORDER — BENZOCAINE AND MENTHOL 5; 1 G/100ML; G/100ML
1 LIQUID ORAL THREE TIMES A DAY
Refills: 0 | Status: DISCONTINUED | OUTPATIENT
Start: 2020-10-01 | End: 2020-10-02

## 2020-10-01 RX ADMIN — PIPERACILLIN AND TAZOBACTAM 25 GRAM(S): 4; .5 INJECTION, POWDER, LYOPHILIZED, FOR SOLUTION INTRAVENOUS at 05:39

## 2020-10-01 RX ADMIN — Medication 81 MILLIGRAM(S): at 12:23

## 2020-10-01 RX ADMIN — PIPERACILLIN AND TAZOBACTAM 25 GRAM(S): 4; .5 INJECTION, POWDER, LYOPHILIZED, FOR SOLUTION INTRAVENOUS at 21:30

## 2020-10-01 RX ADMIN — LOSARTAN POTASSIUM 100 MILLIGRAM(S): 100 TABLET, FILM COATED ORAL at 05:39

## 2020-10-01 RX ADMIN — ENOXAPARIN SODIUM 40 MILLIGRAM(S): 100 INJECTION SUBCUTANEOUS at 12:23

## 2020-10-01 RX ADMIN — PIPERACILLIN AND TAZOBACTAM 25 GRAM(S): 4; .5 INJECTION, POWDER, LYOPHILIZED, FOR SOLUTION INTRAVENOUS at 14:33

## 2020-10-01 RX ADMIN — BENZOCAINE AND MENTHOL 1 LOZENGE: 5; 1 LIQUID ORAL at 18:42

## 2020-10-01 RX ADMIN — Medication 12.5 MILLIGRAM(S): at 05:42

## 2020-10-01 NOTE — PROGRESS NOTE ADULT - ASSESSMENT
70 year old female with PMH HTN, MVP (diagnosed 30 years ago during pregnancy without follow up), thalassemia minor, diverticulosis, who presented with abdominal pain and found to have a 3.8 x 2.7 x 3.1 cm abscess posterior to the uterus with possible fistulous communication to the sigmoid colon.    Plan:    - Pain control  - Advance to LRD  - Continue with zosyn  - Lovenox for DVT ppx  - F/up AM labs, WBC    [facility] 0364

## 2020-10-01 NOTE — PROGRESS NOTE ADULT - SUBJECTIVE AND OBJECTIVE BOX
RED SURGERY DAILY PROGRESS NOTE:       SUBJECTIVE/ROS: Patient seen and examined at bedside.  Patient states her pain has improved, but still c/o some LLQ cramping.  She is tolerating CLD without n/v, passing flatus.      MEDICATIONS  (STANDING):  aspirin  chewable 81 milliGRAM(s) Oral daily  enoxaparin Injectable 40 milliGRAM(s) SubCutaneous daily  hydrochlorothiazide 12.5 milliGRAM(s) Oral daily  influenza   Vaccine 0.5 milliLiter(s) IntraMuscular once  losartan 100 milliGRAM(s) Oral daily  piperacillin/tazobactam IVPB.. 3.375 Gram(s) IV Intermittent every 8 hours    MEDICATIONS  (PRN):  ibuprofen  Tablet. 400 milliGRAM(s) Oral every 6 hours PRN Mild Pain (1 - 3)      OBJECTIVE:    Vital Signs Last 24 Hrs  T(C): 36.8 (01 Oct 2020 05:36), Max: 36.9 (30 Sep 2020 12:45)  T(F): 98.3 (01 Oct 2020 05:36), Max: 98.4 (30 Sep 2020 12:45)  HR: 77 (01 Oct 2020 05:36) (74 - 92)  BP: 151/74 (01 Oct 2020 05:36) (125/70 - 151/74)  BP(mean): --  RR: 20 (01 Oct 2020 05:36) (17 - 20)  SpO2: 97% (01 Oct 2020 05:36) (95% - 97%)        I&O's Detail    30 Sep 2020 07:01  -  01 Oct 2020 07:00  --------------------------------------------------------  IN:    IV PiggyBack: 300 mL    Oral Fluid: 900 mL  Total IN: 1200 mL    OUT:    Estimated Blood Loss (mL): 0 mL    Voided (mL): 1700 mL  Total OUT: 1700 mL    Total NET: -500 mL          Daily     Daily     LABS:                        7.1    9.91  )-----------( 614      ( 01 Oct 2020 07:15 )             22.7     10-01    133<L>  |  97  |  <4<L>  ----------------------------<  82  4.7   |  25  |  0.54    Ca    9.1      01 Oct 2020 07:15  Phos  4.0     10-01  Mg     2.0     10-01                    PHYSICAL EXAM:  Constitutional: well developed, well nourished, NAD  Respiratory: No increased WOB  Abdomen: soft, with minimal focal LLQ tenderness.  No rebound or guarding  Psych: A&Ox3

## 2020-10-02 ENCOUNTER — TRANSCRIPTION ENCOUNTER (OUTPATIENT)
Age: 70
End: 2020-10-02

## 2020-10-02 ENCOUNTER — APPOINTMENT (OUTPATIENT)
Dept: COLORECTAL SURGERY | Facility: CLINIC | Age: 70
End: 2020-10-02

## 2020-10-02 VITALS
DIASTOLIC BLOOD PRESSURE: 78 MMHG | TEMPERATURE: 98 F | SYSTOLIC BLOOD PRESSURE: 123 MMHG | OXYGEN SATURATION: 97 % | RESPIRATION RATE: 18 BRPM | HEART RATE: 87 BPM

## 2020-10-02 RX ORDER — IBUPROFEN 200 MG
1 TABLET ORAL
Qty: 0 | Refills: 0 | DISCHARGE
Start: 2020-10-02

## 2020-10-02 RX ADMIN — PIPERACILLIN AND TAZOBACTAM 25 GRAM(S): 4; .5 INJECTION, POWDER, LYOPHILIZED, FOR SOLUTION INTRAVENOUS at 06:26

## 2020-10-02 RX ADMIN — Medication 81 MILLIGRAM(S): at 11:12

## 2020-10-02 RX ADMIN — LOSARTAN POTASSIUM 100 MILLIGRAM(S): 100 TABLET, FILM COATED ORAL at 06:26

## 2020-10-02 RX ADMIN — Medication 12.5 MILLIGRAM(S): at 06:26

## 2020-10-02 RX ADMIN — BENZOCAINE AND MENTHOL 1 LOZENGE: 5; 1 LIQUID ORAL at 06:28

## 2020-10-02 RX ADMIN — ENOXAPARIN SODIUM 40 MILLIGRAM(S): 100 INJECTION SUBCUTANEOUS at 11:12

## 2020-10-02 NOTE — DISCHARGE NOTE PROVIDER - NSDCFUSCHEDAPPT_GEN_ALL_CORE_FT
AJ ESPINOZA ; 10/05/2020 ; NPP Gastro [facility] No AJ ESPINOZA ; 10/16/2020 ; NPP Surg [place] [facility]

## 2020-10-02 NOTE — DISCHARGE NOTE PROVIDER - CARE PROVIDER_API CALL
Diony Marx  COLON/RECTAL SURGERY  [address] [place] [address], [address]  [place] [address]  Phone: [phone]  Fax: [phone]  Follow Up Time: 2 weeks

## 2020-10-02 NOTE — PROGRESS NOTE ADULT - SUBJECTIVE AND OBJECTIVE BOX
Interval events: no acute events    SUBJECTIVE:  Reports pain well controlled   Denies nausea, vomiting  Is passing gas and having bowel movements.   Tolerating diet LRD  Ambulating independently    OBJECTIVE:  Vital Signs Last 24 Hrs  T(C): 36.5 (02 Oct 2020 06:15), Max: 36.9 (01 Oct 2020 17:22)  T(F): 97.7 (02 Oct 2020 06:15), Max: 98.5 (01 Oct 2020 17:22)  HR: 82 (02 Oct 2020 06:15) (82 - 101)  BP: 153/81 (02 Oct 2020 06:15) (132/75 - 164/86)  BP(mean): --  RR: 18 (02 Oct 2020 06:15) (18 - 20)  SpO2: 98% (02 Oct 2020 06:15) (96% - 99%)    Physical Examination:  GEN: NAD, resting quietly  NEURO: AAOx3, CN II-XII grossly intact, no focal deficits  PULM: symmetric chest rise bilaterally, no increased WOB  ABD: soft, nontender, nondistended  EXTR: no LE erythema, moving all extremities      LABS:                        7.1    9.91  )-----------( 614      ( 01 Oct 2020 07:15 )             22.7       10-01    133<L>  |  97  |  <4<L>  ----------------------------<  82  4.7   |  25  |  0.54    Ca    9.1      01 Oct 2020 07:15  Phos  4.0     10-01  Mg     2.0     10-01          IMAGING:  []    A/P:

## 2020-10-02 NOTE — DISCHARGE NOTE PROVIDER - HOSPITAL COURSE
70 year old female with PMH HTN, MVP (diagnosed 30 years ago during pregnancy without follow up), thalassemia minor, diverticulosis, who presented with abdominal pain and found to have mass near the sigmoid colon. Patient reports abdominal camps, constipation and diarrhea, low grade fever in April that resolved after empiric treament with antibiotics. Symptoms began again August with worsening abdominal pain, diarrhea/soft stool, and one episode of bloody mucous on stool. Patient received a Colonoscopy outpatient with Dr. Fernandez on 9/23 who saw sigmoid structure and extrinsic compression and sent the patient in today for CT. Patient currently denies nausea, vomiting, chills, SOB, CP.      CT Abdomen/Pelvis: IMPRESSION:  Extensive inflammatory pelvic mass centered on the sigmoid colon with extension to the adjacent uterus and right retrouterine abscess versus adnexal cyst.  Mild bilateral hydronephrosis secondary to inflammatory mass.    Patient was admitted to surgery, made NPO/IVF, IV abx, tumor markers sent (WNL).  Pelvic ultrasound was ordered which showed:  3.8 x 2.7 x 3.1 cm abscess posterior to the uterus with possible fistulous communication to the sigmoid colon.    The patients pain improved and her diet was advanced which she tolerated.  Her WBC continued to downtrend.  On day of discharge, the patient was tolerating diet, ambulating well and pain controlled. The patient will be discharged home on 10 days of Augmentin to complete antibiotic course and will follow up with Dr. Marx as an outpatient within 1-2 weeks.       70 year old female with PMH HTN, MVP (diagnosed 30 years ago during pregnancy without follow up), thalassemia minor, diverticulosis, who presented with abdominal pain and found to have mass near the sigmoid colon. Patient reports abdominal camps, constipation and diarrhea, low grade fever in April that resolved after empiric treament with antibiotics. Symptoms began again August with worsening abdominal pain, diarrhea/soft stool, and one episode of bloody mucous on stool. Patient received a Colonoscopy outpatient with Dr. Fernandez on 9/23 who saw sigmoid structure and extrinsic compression and sent the patient in today for CT. Patient currently denies nausea, vomiting, chills, SOB, CP.      CT Abdomen/Pelvis: IMPRESSION:  Extensive inflammatory pelvic mass centered on the sigmoid colon with extension to the adjacent uterus and right retrouterine abscess versus adnexal cyst.  Mild bilateral hydronephrosis secondary to inflammatory mass.    Patient was admitted to surgery, made NPO/IVF, IV abx, tumor markers sent (WNL).  Pelvic ultrasound was ordered which showed:  3.8 x 2.7 x 3.1 cm abscess posterior to the uterus with possible fistulous communication to the sigmoid colon.    The patients pain improved and her diet was advanced which she tolerated.  Her WBC continued to downtrend.  On day of discharge, the patient was tolerating diet, ambulating well and pain controlled. The patient will be discharged home on 14 days of Augmentin to complete antibiotic course and will follow up with Dr. Marx as an outpatient within 1-2 weeks.       70 year old female with PMH HTN, MVP (diagnosed 30 years ago during pregnancy without follow up), thalassemia minor, diverticulosis, who presented with abdominal pain and found to have mass near the sigmoid colon. Patient reports abdominal camps, constipation and diarrhea, low grade fever in April that resolved after empiric treament with antibiotics. Symptoms began again August with worsening abdominal pain, diarrhea/soft stool, and one episode of bloody mucous on stool. Patient received a Colonoscopy outpatient with Dr. Fernandez on 9/23 who saw sigmoid structure and extrinsic compression and sent the patient in today for CT. Patient currently denies nausea, vomiting, chills, SOB, CP.  Patient with leukocytosis on admission but did not meet SIRS/sepsis criteria. A 1L bolus of NS was given for fluid resuscitation.    CT Abdomen/Pelvis: IMPRESSION:  Extensive inflammatory pelvic mass centered on the sigmoid colon with extension to the adjacent uterus and right retrouterine abscess versus adnexal cyst.  Mild bilateral hydronephrosis secondary to inflammatory mass.    Patient was admitted to surgery, made NPO/IVF, IV abx, tumor markers sent (WNL).  Pelvic ultrasound was ordered which showed:  3.8 x 2.7 x 3.1 cm abscess posterior to the uterus with possible fistulous communication to the sigmoid colon.    The etiology of the mass was found to be an abscess likely secondary to chronic diverticulitis.     The patients pain improved and her diet was advanced which she tolerated.  Her WBC continued to downtrend.  On day of discharge, the patient was tolerating diet, ambulating well and pain controlled. The patient will be discharged home on 14 days of Augmentin to complete antibiotic course and will follow up with Dr. Marx as an outpatient within 1-2 weeks.

## 2020-10-02 NOTE — PROGRESS NOTE ADULT - ASSESSMENT
70 year old female with PMH HTN, MVP (diagnosed 30 years ago during pregnancy without follow up), thalassemia minor, diverticulosis, who presented with abdominal pain and found to have a 3.8 x 2.7 x 3.1 cm abscess posterior to the uterus with possible fistulous communication to the sigmoid colon.    Plan:    - Pain control  - c/w LRD   - continue zosyn  - DC w/ PO abx   - Lovenox for DVT ppx  - F/up AM labs, WBC    Red Surgery 900

## 2020-10-02 NOTE — DISCHARGE NOTE NURSING/CASE MANAGEMENT/SOCIAL WORK - PATIENT PORTAL LINK FT
You can access the FollowMyHealth Patient Portal offered by [facility] by registering at the following website: [email or web address]. By joining [employer]’s FollowMyHealth portal, you will also be able to view your health information using other applications (apps) compatible with our system.

## 2020-10-02 NOTE — DISCHARGE NOTE PROVIDER - NSDCCPCAREPLAN_GEN_ALL_CORE_FT
PRINCIPAL DISCHARGE DIAGNOSIS  Diagnosis: Pelvic abscess in female  Assessment and Plan of Treatment: Please continue oral antibiotics (Augmentin) x10 days as prescribed  DIET: Low fiber diet  NOTIFY YOUR SURGEON IF: any fever (over 100.4 F) or chills, persistent nausea/vomiting with inability to tolerate food or liquids, persistent diarrhea, or if your pain is not controlled on your discharge pain medications.  FOLLOW-UP:  1. Please call to make a follow-up appointment within one week of discharge   2. Please follow up with your primary care physician in one week regarding your hospitalization.       PRINCIPAL DISCHARGE DIAGNOSIS  Diagnosis: Pelvic abscess in female  Assessment and Plan of Treatment: Please continue oral antibiotics (Augmentin) x14 days as prescribed  DIET: Low fiber diet  NOTIFY YOUR SURGEON IF: any fever (over 100.4 F) or chills, persistent nausea/vomiting with inability to tolerate food or liquids, persistent diarrhea, or if your pain is not controlled on your discharge pain medications.  FOLLOW-UP:  1. Please call to make a follow-up appointment within one week of discharge   2. Please follow up with your primary care physician in one week regarding your hospitalization.       PRINCIPAL DISCHARGE DIAGNOSIS  Diagnosis: Diverticulitis of large intestine with abscess  Assessment and Plan of Treatment: Please continue oral antibiotics (Augmentin) x14 days as prescribed  DIET: Low fiber diet  NOTIFY YOUR SURGEON IF: any fever (over 100.4 F) or chills, persistent nausea/vomiting with inability to tolerate food or liquids, persistent diarrhea, or if your pain is not controlled on your discharge pain medications.  FOLLOW-UP:  1. Please call to make a follow-up appointment within one week of discharge   2. Please follow up with your primary care physician in one week regarding your hospitalization.

## 2020-10-02 NOTE — DISCHARGE NOTE PROVIDER - NSDCMRMEDTOKEN_GEN_ALL_CORE_FT
aspirin 81 mg oral tablet, chewable: 1 tab(s) orally once a day  hydroCHLOROthiazide 12.5 mg oral capsule: 1 cap(s) orally once a day  Multiple Vitamins oral gum: 2 gum orally  olmesartan 40 mg oral tablet: 1 tab(s) orally once a day  Probiotic Formula oral capsule: 1 cap(s) orally once a day  Vitamin C 1000 mg oral tablet: 1 tab(s) orally once a day   aspirin 81 mg oral tablet, chewable: 1 tab(s) orally once a day  hydroCHLOROthiazide 12.5 mg oral capsule: 1 cap(s) orally once a day  ibuprofen 400 mg oral tablet: 1 tab(s) orally every 6 hours, As needed, Mild Pain (1 - 3)  Multiple Vitamins oral gum: 2 gum orally  olmesartan 40 mg oral tablet: 1 tab(s) orally once a day  Probiotic Formula oral capsule: 1 cap(s) orally once a day  Vitamin C 1000 mg oral tablet: 1 tab(s) orally once a day   aspirin 81 mg oral tablet, chewable: 1 tab(s) orally once a day  Augmentin 875 mg-125 mg oral tablet: 1 tab(s) orally 2 times a day   hydroCHLOROthiazide 12.5 mg oral capsule: 1 cap(s) orally once a day  ibuprofen 400 mg oral tablet: 1 tab(s) orally every 6 hours, As needed, Mild Pain (1 - 3)  Multiple Vitamins oral gum: 2 gum orally  olmesartan 40 mg oral tablet: 1 tab(s) orally once a day  Probiotic Formula oral capsule: 1 cap(s) orally once a day  Vitamin C 1000 mg oral tablet: 1 tab(s) orally once a day

## 2020-10-05 ENCOUNTER — APPOINTMENT (OUTPATIENT)
Dept: GASTROENTEROLOGY | Facility: CLINIC | Age: 70
End: 2020-10-05

## 2020-10-05 PROBLEM — K57.92 DIVERTICULITIS OF INTESTINE, PART UNSPECIFIED, WITHOUT PERFORATION OR ABSCESS WITHOUT BLEEDING: Chronic | Status: ACTIVE | Noted: 2020-09-28

## 2020-10-05 PROBLEM — I34.1 NONRHEUMATIC MITRAL (VALVE) PROLAPSE: Chronic | Status: ACTIVE | Noted: 2020-09-28

## 2020-10-10 DIAGNOSIS — Z01.818 ENCOUNTER FOR OTHER PREPROCEDURAL EXAMINATION: ICD-10-CM

## 2020-10-11 ENCOUNTER — APPOINTMENT (OUTPATIENT)
Dept: DISASTER EMERGENCY | Facility: CLINIC | Age: 70
End: 2020-10-11

## 2020-10-14 ENCOUNTER — APPOINTMENT (OUTPATIENT)
Dept: COLORECTAL SURGERY | Facility: CLINIC | Age: 70
End: 2020-10-14

## 2020-10-16 ENCOUNTER — APPOINTMENT (OUTPATIENT)
Dept: COLORECTAL SURGERY | Facility: CLINIC | Age: 70
End: 2020-10-16
Payer: MEDICARE

## 2020-10-16 VITALS — BODY MASS INDEX: 24.55 KG/M2 | HEIGHT: 68 IN | WEIGHT: 162 LBS

## 2020-10-16 PROCEDURE — 99215 OFFICE O/P EST HI 40 MIN: CPT

## 2020-10-16 RX ORDER — ASCORBIC ACID 500 MG
TABLET ORAL
Refills: 0 | Status: ACTIVE | COMMUNITY

## 2020-10-16 RX ORDER — SODIUM SULFATE, POTASSIUM SULFATE, MAGNESIUM SULFATE 17.5; 3.13; 1.6 G/ML; G/ML; G/ML
17.5-3.13-1.6 SOLUTION, CONCENTRATE ORAL
Qty: 1 | Refills: 0 | Status: DISCONTINUED | COMMUNITY
Start: 2020-09-14 | End: 2020-10-16

## 2020-10-16 RX ORDER — BACILLUS COAGULANS/INULIN 1B-250 MG
CAPSULE ORAL
Refills: 0 | Status: ACTIVE | COMMUNITY

## 2020-10-16 RX ORDER — METRONIDAZOLE 500 MG/1
500 TABLET ORAL 3 TIMES DAILY
Qty: 21 | Refills: 0 | Status: DISCONTINUED | COMMUNITY
Start: 2020-07-10 | End: 2020-10-16

## 2020-10-21 PROCEDURE — 71045 X-RAY EXAM CHEST 1 VIEW: CPT

## 2020-10-21 PROCEDURE — 87086 URINE CULTURE/COLONY COUNT: CPT

## 2020-10-21 PROCEDURE — U0003: CPT

## 2020-10-21 PROCEDURE — 86850 RBC ANTIBODY SCREEN: CPT

## 2020-10-21 PROCEDURE — 83735 ASSAY OF MAGNESIUM: CPT

## 2020-10-21 PROCEDURE — 81003 URINALYSIS AUTO W/O SCOPE: CPT

## 2020-10-21 PROCEDURE — 80053 COMPREHEN METABOLIC PANEL: CPT

## 2020-10-21 PROCEDURE — 86803 HEPATITIS C AB TEST: CPT

## 2020-10-21 PROCEDURE — 85610 PROTHROMBIN TIME: CPT

## 2020-10-21 PROCEDURE — 85025 COMPLETE CBC W/AUTO DIFF WBC: CPT

## 2020-10-21 PROCEDURE — 86901 BLOOD TYPING SEROLOGIC RH(D): CPT

## 2020-10-21 PROCEDURE — 82378 CARCINOEMBRYONIC ANTIGEN: CPT

## 2020-10-21 PROCEDURE — 84100 ASSAY OF PHOSPHORUS: CPT

## 2020-10-21 PROCEDURE — 76856 US EXAM PELVIC COMPLETE: CPT

## 2020-10-21 PROCEDURE — 74177 CT ABD & PELVIS W/CONTRAST: CPT

## 2020-10-21 PROCEDURE — 85730 THROMBOPLASTIN TIME PARTIAL: CPT

## 2020-10-21 PROCEDURE — 93005 ELECTROCARDIOGRAM TRACING: CPT

## 2020-10-21 PROCEDURE — 85027 COMPLETE CBC AUTOMATED: CPT

## 2020-10-21 PROCEDURE — 99285 EMERGENCY DEPT VISIT HI MDM: CPT | Mod: 25

## 2020-10-21 PROCEDURE — 86304 IMMUNOASSAY TUMOR CA 125: CPT

## 2020-10-21 PROCEDURE — 96374 THER/PROPH/DIAG INJ IV PUSH: CPT

## 2020-10-21 PROCEDURE — 80048 BASIC METABOLIC PNL TOTAL CA: CPT

## 2020-10-21 PROCEDURE — 82565 ASSAY OF CREATININE: CPT

## 2020-10-21 PROCEDURE — 86900 BLOOD TYPING SEROLOGIC ABO: CPT

## 2020-10-23 ENCOUNTER — APPOINTMENT (OUTPATIENT)
Dept: GYNECOLOGIC ONCOLOGY | Facility: CLINIC | Age: 70
End: 2020-10-23
Payer: MEDICARE

## 2020-10-23 VITALS
TEMPERATURE: 98 F | SYSTOLIC BLOOD PRESSURE: 175 MMHG | HEIGHT: 68 IN | BODY MASS INDEX: 24.55 KG/M2 | WEIGHT: 162 LBS | HEART RATE: 110 BPM | DIASTOLIC BLOOD PRESSURE: 90 MMHG

## 2020-10-23 DIAGNOSIS — K56.699 OTHER INTESTINAL OBSTRUCTION UNSPECIFIED AS TO PARTIAL VERSUS COMPLETE OBSTRUCTION: ICD-10-CM

## 2020-10-23 DIAGNOSIS — Z80.3 FAMILY HISTORY OF MALIGNANT NEOPLASM OF BREAST: ICD-10-CM

## 2020-10-23 DIAGNOSIS — Z87.42 PERSONAL HISTORY OF OTHER DISEASES OF THE FEMALE GENITAL TRACT: ICD-10-CM

## 2020-10-23 DIAGNOSIS — D25.9 LEIOMYOMA OF UTERUS, UNSPECIFIED: ICD-10-CM

## 2020-10-23 PROCEDURE — 99205 OFFICE O/P NEW HI 60 MIN: CPT

## 2020-10-26 ENCOUNTER — OUTPATIENT (OUTPATIENT)
Dept: OUTPATIENT SERVICES | Facility: HOSPITAL | Age: 70
LOS: 1 days | End: 2020-10-26
Payer: MEDICARE

## 2020-10-26 ENCOUNTER — APPOINTMENT (OUTPATIENT)
Dept: CT IMAGING | Facility: CLINIC | Age: 70
End: 2020-10-26
Payer: MEDICARE

## 2020-10-26 ENCOUNTER — RESULT REVIEW (OUTPATIENT)
Age: 70
End: 2020-10-26

## 2020-10-26 DIAGNOSIS — K57.90 DIVERTICULOSIS OF INTESTINE, PART UNSPECIFIED, WITHOUT PERFORATION OR ABSCESS WITHOUT BLEEDING: ICD-10-CM

## 2020-10-26 DIAGNOSIS — K56.699 OTHER INTESTINAL OBSTRUCTION UNSPECIFIED AS TO PARTIAL VERSUS COMPLETE OBSTRUCTION: ICD-10-CM

## 2020-10-26 DIAGNOSIS — Z98.890 OTHER SPECIFIED POSTPROCEDURAL STATES: Chronic | ICD-10-CM

## 2020-10-26 DIAGNOSIS — Z90.79 ACQUIRED ABSENCE OF OTHER GENITAL ORGAN(S): Chronic | ICD-10-CM

## 2020-10-26 DIAGNOSIS — Z98.891 HISTORY OF UTERINE SCAR FROM PREVIOUS SURGERY: Chronic | ICD-10-CM

## 2020-10-26 PROCEDURE — 74177 CT ABD & PELVIS W/CONTRAST: CPT

## 2020-10-26 PROCEDURE — 74177 CT ABD & PELVIS W/CONTRAST: CPT | Mod: 26

## 2020-10-26 NOTE — HISTORY OF PRESENT ILLNESS
[FreeTextEntry1] : Pleasant 70-year-old white female who presents for a follow-up visit. She was recently hospitalized with complicated diverticulitis.\par \par Patient has had episodes of diverticulitis in the past. Due to ongoing abdominal discomfort she underwent a colonoscopy in late September revealing diverticulosis and a sigmoid stricture. She was sent for a CAT scan which revealed significant chronic, smoldering diverticulitis with some extraluminal air droplets and possible pericolonic fluid collections. She also had an enlarged uterus. Patient was admitted to the hospital and treated with intravenous antibiotics. Her symptoms improved and she was discharged home to complete a 14 day course of antibiotics. Currently she feels better although she does occasionally have some left lower quadrant abdominal discomfort. She has completed her antibiotics today.\par \par Patient has had multiple lower abdominal operations. She has a history of endometriosis. She is status post left salpingo-oophorectomy. She initially had a laparoscopic procedure and then required an open procedure for endometriosis. She eventually had 2  sections also via Pfannenstiel incision.\par \par As mentioned above, the patient recently had a colonoscopy which revealed no malignancy. Since she has had long-standing diverticulitis, and most recently had a very complicated bout, I believe she is a candidate for elective resection. Hopefully this can be done in one stage without an ostomy although the possibility of a temporary ostomy was also discussed. She will also be seen by gynecologic oncology preoperatively and she may require a concomitant right salpingo-oophorectomy and abdominal hysterectomy. We talked about aspects of surgery including anticipated operative time, length of hospital stay and time to complete recuperation. Risks, alternatives and benefits including but not limited to anastomotic leak, wound infection and deep venous thrombosis were discussed. Again, the possible need for temporary ostomy was discussed.\par \par All questions were answered and she wishes to proceed. We will perform a repeat CAT scan preoperatively in early November. Her surgery will be scheduled for Tuesday, November 10. Again, she will see gynecologic oncology preoperatively.

## 2020-10-29 ENCOUNTER — RX RENEWAL (OUTPATIENT)
Age: 70
End: 2020-10-29

## 2020-10-29 ENCOUNTER — APPOINTMENT (OUTPATIENT)
Dept: INTERNAL MEDICINE | Facility: CLINIC | Age: 70
End: 2020-10-29
Payer: MEDICARE

## 2020-10-29 PROCEDURE — G0008: CPT

## 2020-10-29 PROCEDURE — 90662 IIV NO PRSV INCREASED AG IM: CPT

## 2020-10-30 ENCOUNTER — RX RENEWAL (OUTPATIENT)
Age: 70
End: 2020-10-30

## 2020-11-03 ENCOUNTER — OUTPATIENT (OUTPATIENT)
Dept: OUTPATIENT SERVICES | Facility: HOSPITAL | Age: 70
LOS: 1 days | End: 2020-11-03
Payer: MEDICARE

## 2020-11-03 VITALS
WEIGHT: 153 LBS | RESPIRATION RATE: 12 BRPM | HEIGHT: 67 IN | SYSTOLIC BLOOD PRESSURE: 168 MMHG | DIASTOLIC BLOOD PRESSURE: 89 MMHG | OXYGEN SATURATION: 100 % | TEMPERATURE: 99 F | HEART RATE: 103 BPM

## 2020-11-03 DIAGNOSIS — K57.92 DIVERTICULITIS OF INTESTINE, PART UNSPECIFIED, WITHOUT PERFORATION OR ABSCESS WITHOUT BLEEDING: ICD-10-CM

## 2020-11-03 DIAGNOSIS — K57.32 DIVERTICULITIS OF LARGE INTESTINE WITHOUT PERFORATION OR ABSCESS WITHOUT BLEEDING: ICD-10-CM

## 2020-11-03 DIAGNOSIS — Z29.9 ENCOUNTER FOR PROPHYLACTIC MEASURES, UNSPECIFIED: ICD-10-CM

## 2020-11-03 DIAGNOSIS — Z90.79 ACQUIRED ABSENCE OF OTHER GENITAL ORGAN(S): Chronic | ICD-10-CM

## 2020-11-03 DIAGNOSIS — I10 ESSENTIAL (PRIMARY) HYPERTENSION: ICD-10-CM

## 2020-11-03 DIAGNOSIS — Z98.890 OTHER SPECIFIED POSTPROCEDURAL STATES: Chronic | ICD-10-CM

## 2020-11-03 DIAGNOSIS — Z98.891 HISTORY OF UTERINE SCAR FROM PREVIOUS SURGERY: Chronic | ICD-10-CM

## 2020-11-03 DIAGNOSIS — Z01.818 ENCOUNTER FOR OTHER PREPROCEDURAL EXAMINATION: ICD-10-CM

## 2020-11-03 LAB
A1C WITH ESTIMATED AVERAGE GLUCOSE RESULT: 5.5 % — SIGNIFICANT CHANGE UP (ref 4–5.6)
ANION GAP SERPL CALC-SCNC: 9 MMOL/L — SIGNIFICANT CHANGE UP (ref 5–17)
BLD GP AB SCN SERPL QL: NEGATIVE — SIGNIFICANT CHANGE UP
BUN SERPL-MCNC: 13 MG/DL — SIGNIFICANT CHANGE UP (ref 7–23)
CALCIUM SERPL-MCNC: 10.1 MG/DL — SIGNIFICANT CHANGE UP (ref 8.4–10.5)
CHLORIDE SERPL-SCNC: 91 MMOL/L — LOW (ref 96–108)
CO2 SERPL-SCNC: 28 MMOL/L — SIGNIFICANT CHANGE UP (ref 22–31)
CREAT SERPL-MCNC: 0.56 MG/DL — SIGNIFICANT CHANGE UP (ref 0.5–1.3)
ESTIMATED AVERAGE GLUCOSE: 111 MG/DL — SIGNIFICANT CHANGE UP (ref 68–114)
GLUCOSE SERPL-MCNC: 87 MG/DL — SIGNIFICANT CHANGE UP (ref 70–99)
HCT VFR BLD CALC: 32 % — LOW (ref 34.5–45)
HGB BLD-MCNC: 9.8 G/DL — LOW (ref 11.5–15.5)
MCHC RBC-ENTMCNC: 18.2 PG — LOW (ref 27–34)
MCHC RBC-ENTMCNC: 30.6 GM/DL — LOW (ref 32–36)
MCV RBC AUTO: 59.4 FL — LOW (ref 80–100)
NRBC # BLD: 0 /100 WBCS — SIGNIFICANT CHANGE UP (ref 0–0)
PLATELET # BLD AUTO: 475 K/UL — HIGH (ref 150–400)
POTASSIUM SERPL-MCNC: 3.6 MMOL/L — SIGNIFICANT CHANGE UP (ref 3.5–5.3)
POTASSIUM SERPL-SCNC: 3.6 MMOL/L — SIGNIFICANT CHANGE UP (ref 3.5–5.3)
RBC # BLD: 5.39 M/UL — HIGH (ref 3.8–5.2)
RBC # FLD: 20.2 % — HIGH (ref 10.3–14.5)
RH IG SCN BLD-IMP: POSITIVE — SIGNIFICANT CHANGE UP
SODIUM SERPL-SCNC: 128 MMOL/L — LOW (ref 135–145)
WBC # BLD: 9.03 K/UL — SIGNIFICANT CHANGE UP (ref 3.8–10.5)
WBC # FLD AUTO: 9.03 K/UL — SIGNIFICANT CHANGE UP (ref 3.8–10.5)

## 2020-11-03 PROCEDURE — 85027 COMPLETE CBC AUTOMATED: CPT

## 2020-11-03 PROCEDURE — 83036 HEMOGLOBIN GLYCOSYLATED A1C: CPT

## 2020-11-03 PROCEDURE — 86900 BLOOD TYPING SEROLOGIC ABO: CPT

## 2020-11-03 PROCEDURE — G0463: CPT

## 2020-11-03 PROCEDURE — 86901 BLOOD TYPING SEROLOGIC RH(D): CPT

## 2020-11-03 PROCEDURE — 80048 BASIC METABOLIC PNL TOTAL CA: CPT

## 2020-11-03 PROCEDURE — 87086 URINE CULTURE/COLONY COUNT: CPT

## 2020-11-03 PROCEDURE — 86850 RBC ANTIBODY SCREEN: CPT

## 2020-11-03 RX ORDER — CEFOTETAN DISODIUM 1 G
2 VIAL (EA) INJECTION ONCE
Refills: 0 | Status: DISCONTINUED | OUTPATIENT
Start: 2020-11-10 | End: 2020-11-10

## 2020-11-03 RX ORDER — SODIUM CHLORIDE 9 MG/ML
3 INJECTION INTRAMUSCULAR; INTRAVENOUS; SUBCUTANEOUS EVERY 8 HOURS
Refills: 0 | Status: DISCONTINUED | OUTPATIENT
Start: 2020-11-10 | End: 2020-11-10

## 2020-11-03 RX ORDER — CHLORHEXIDINE GLUCONATE 213 G/1000ML
1 SOLUTION TOPICAL ONCE
Refills: 0 | Status: DISCONTINUED | OUTPATIENT
Start: 2020-11-10 | End: 2020-11-10

## 2020-11-03 RX ORDER — LIDOCAINE HCL 20 MG/ML
0.2 VIAL (ML) INJECTION ONCE
Refills: 0 | Status: DISCONTINUED | OUTPATIENT
Start: 2020-11-10 | End: 2020-11-10

## 2020-11-03 NOTE — H&P PST ADULT - ASSESSMENT
MARIIAI VTE 2.0 SCORE [CLOT updated 2019]    AGE RELATED RISK FACTORS                                                       MOBILITY RELATED FACTORS  [ ] Age 41-60 years                                            (1 Point)                    [ ] Bed rest                                                        (1 Point)  [ ] Age: 61-74 years                                           (2 Points)                  [ ] Plaster cast                                                   (2 Points)  [ ] Age= 75 years                                              (3 Points)                    [ ] Bed bound for more than 72 hours                 (2 Points)    DISEASE RELATED RISK FACTORS                                               GENDER SPECIFIC FACTORS  [ ] Edema in the lower extremities                       (1 Point)              [ ] Pregnancy                                                     (1 Point)  [ ] Varicose veins                                               (1 Point)                     [ ] Post-partum < 6 weeks                                   (1 Point)             [ ] BMI > 25 Kg/m2                                            (1 Point)                     [ ] Hormonal therapy  or oral contraception          (1 Point)                 [ ] Sepsis (in the previous month)                        (1 Point)               [ ] History of pregnancy complications                 (1 point)  [ ] Pneumonia or serious lung disease                                               [ ] Unexplained or recurrent                     (1 Point)           (in the previous month)                               (1 Point)  [ ] Abnormal pulmonary function test                     (1 Point)                 SURGERY RELATED RISK FACTORS  [ ] Acute myocardial infarction                              (1 Point)               [ ]  Section                                             (1 Point)  [ ] Congestive heart failure (in the previous month)  (1 Point)      [ ] Minor surgery                                                  (1 Point)   [ ] Inflammatory bowel disease                             (1 Point)               [ ] Arthroscopic surgery                                        (2 Points)  [ ] Central venous access                                      (2 Points)                [ ] General surgery lasting more than 45 minutes (2 points)  [ ] Malignancy- Present or previous                   (2 Points)                [ ] Elective arthroplasty                                         (5 points)    [ ] Stroke (in the previous month)                          (5 Points)                                                                                                                                                           HEMATOLOGY RELATED FACTORS                                                 TRAUMA RELATED RISK FACTORS  [ ] Prior episodes of VTE                                     (3 Points)                [ ] Fracture of the hip, pelvis, or leg                       (5 Points)  [ ] Positive family history for VTE                         (3 Points)             [ ] Acute spinal cord injury (in the previous month)  (5 Points)  [ ] Prothrombin 63143 A                                     (3 Points)               [ ] Paralysis  (less than 1 month)                             (5 Points)  [ ] Factor V Leiden                                             (3 Points)                  [ ] Multiple Trauma within 1 month                        (5 Points)  [ ] Lupus anticoagulants                                     (3 Points)                                                           [ ] Anticardiolipin antibodies                               (3 Points)                                                       [ ] High homocysteine in the blood                      (3 Points)                                             [ ] Other congenital or acquired thrombophilia      (3 Points)                                                [ ] Heparin induced thrombocytopenia                  (3 Points)                                     Total Score [  4        ] MARIIAI VTE 2.0 SCORE [CLOT updated 2019]    AGE RELATED RISK FACTORS                                                       MOBILITY RELATED FACTORS  [ ] Age 41-60 years                                            (1 Point)                    [ ] Bed rest                                                        (1 Point)  [ ] Age: 61-74 years                                           (2 Points)                  [ ] Plaster cast                                                   (2 Points)  [ ] Age= 75 years                                              (3 Points)                    [ ] Bed bound for more than 72 hours                 (2 Points)    DISEASE RELATED RISK FACTORS                                               GENDER SPECIFIC FACTORS  [ ] Edema in the lower extremities                       (1 Point)              [ ] Pregnancy                                                     (1 Point)  [ ] Varicose veins                                               (1 Point)                     [ ] Post-partum < 6 weeks                                   (1 Point)             [ ] BMI > 25 Kg/m2                                            (1 Point)                     [ ] Hormonal therapy  or oral contraception          (1 Point)                 [ ] Sepsis (in the previous month)                        (1 Point)               [ ] History of pregnancy complications                 (1 point)  [ ] Pneumonia or serious lung disease                                               [ ] Unexplained or recurrent                     (1 Point)           (in the previous month)                               (1 Point)  [ ] Abnormal pulmonary function test                     (1 Point)                 SURGERY RELATED RISK FACTORS  [ ] Acute myocardial infarction                              (1 Point)               [ ]  Section                                             (1 Point)  [ ] Congestive heart failure (in the previous month)  (1 Point)      [ ] Minor surgery                                                  (1 Point)   [ ] Inflammatory bowel disease                             (1 Point)               [ ] Arthroscopic surgery                                        (2 Points)  [ ] Central venous access                                      (2 Points)                [ ] General surgery lasting more than 45 minutes (2 points)  [ ] Malignancy- Present or previous                   (2 Points)                [ ] Elective arthroplasty                                         (5 points)    [ ] Stroke (in the previous month)                          (5 Points)                                                                                                                                                           HEMATOLOGY RELATED FACTORS                                                 TRAUMA RELATED RISK FACTORS  [ ] Prior episodes of VTE                                     (3 Points)                [ ] Fracture of the hip, pelvis, or leg                       (5 Points)  [ ] Positive family history for VTE                         (3 Points)             [ ] Acute spinal cord injury (in the previous month)  (5 Points)  [ ] Prothrombin 87974 A                                     (3 Points)               [ ] Paralysis  (less than 1 month)                             (5 Points)  [ ] Factor V Leiden                                             (3 Points)                  [ ] Multiple Trauma within 1 month                        (5 Points)  [ ] Lupus anticoagulants                                     (3 Points)                                                           [ ] Anticardiolipin antibodies                               (3 Points)                                                       [ ] High homocysteine in the blood                      (3 Points)                                             [ ] Other congenital or acquired thrombophilia      (3 Points)                                                [ ] Heparin induced thrombocytopenia                  (3 Points)                                     Total Score [  6    ]

## 2020-11-03 NOTE — H&P PST ADULT - NSICDXPROBLEM_GEN_ALL_CORE_FT
PROBLEM DIAGNOSES  Problem: Diverticulitis of intestine, part unspecified, without perforation or abscess without bleeding  Assessment and Plan: scheduled ERAS Open Anterior Resection, possible total hysterectomy, possible right salpingectomy-oopherectomy, possible D&C    Problem: HTN (hypertension)  Assessment and Plan: continue antihypertensive medications    Problem: Need for prophylactic measure  Assessment and Plan: The Caprini score indicates this patient is at risk for a VTE event (score 3-5).  Most surgical patients in this group would benefit from pharmacologic prophylaxis.  The surgical team will determine the balance between VTE risk and bleeding risk         PROBLEM DIAGNOSES  Problem: Diverticulitis of intestine, part unspecified, without perforation or abscess without bleeding  Assessment and Plan: scheduled ERAS Open Anterior Resection, possible total hysterectomy, possible right salpingectomy-oopherectomy, possible D&C    Problem: HTN (hypertension)  Assessment and Plan: continue antihypertensive medications    Problem: Need for prophylactic measure  Assessment and Plan: The Caprini score indicates that this patient is at high risk for a VTE event (score 6 or greater). Surgical patients in this group will benefit from both pharmacologic prophylaxis and intermittent compression devices.  The surgical team will determine the balance between VTE risk and bleeding risk, and other clinical considerations

## 2020-11-03 NOTE — H&P PST ADULT - HISTORY OF PRESENT ILLNESS
70 yr old female with PMH 70 yr old female with PMH HTN, Breast Ca (with radiation), s/p right breast lumpectomy, diverticulitis. Present today with recent (9/2020) hospital admission for complicated diverticulitis, CT scan revealed significant chronic, smoldering diverticulitis with some extraluminal air droplets and possible pericolonic fluid collections, and enlarged uterus. Pt asymptomatic today, states that she feels well. Pt scheduled for ERSA: Open anterior resection, possible total abdominal hysterectomy, possible right salpingectomy oophorectomy possible D&C on 11/10/20.      **COVUID swab scheduled for 11/7 at [facility]** 70 yr old female with PMH HTN, Breast Ca (with radiation), s/p right breast lumpectomy, diverticulitis. Present today with recent (9/2020) hospital admission for complicated diverticulitis, CT scan revealed significant chronic, smoldering diverticulitis with some extraluminal air droplets and possible pericolonic fluid collections, and enlarged uterus. Pt asymptomatic today, states that she feels well. Pt scheduled for ERAS: Open anterior resection, possible total abdominal hysterectomy, possible right salpingectomy oophorectomy possible D&C on 11/10/20.      **COVUID swab scheduled for 11/7 at [facility]** 70 yr old female with PMH HTN, Breast Ca (with radiation), s/p right breast lumpectomy, diverticulitis. Present today with recent (9/2020) hospital admission for complicated diverticulitis, CT scan revealed significant chronic, smoldering diverticulitis with some extraluminal air droplets and possible pericolonic fluid collections, and enlarged uterus. Pt asymptomatic today, states that she feels well. Pt scheduled for ERAS: Open anterior resection, possible total abdominal hysterectomy, possible right salpingectomy oophorectomy possible D&C on 11/10/20.      **COVID swab scheduled for 11/7 at [facility]**

## 2020-11-03 NOTE — H&P PST ADULT - REASON FOR ADMISSION
"This started in April of this year, with a presumed stomach virus, had another flare up in July, and then in Sept when I was hospitalized with bad infection, diverticulitis."

## 2020-11-03 NOTE — H&P PST ADULT - NSICDXPASTSURGICALHX_GEN_ALL_CORE_FT
PAST SURGICAL HISTORY:  H/O lumpectomy 2006 right side    History of unilateral salpingectomy left    Previous  section X2    S/P endometrial ablation

## 2020-11-03 NOTE — H&P PST ADULT - NSICDXPASTMEDICALHX_GEN_ALL_CORE_FT
PAST MEDICAL HISTORY:  Breast CA with radiation    Diverticulitis     History of thalassemia minor     HTN (hypertension)     MVP (mitral valve prolapse) diagnosed 30 years ago

## 2020-11-03 NOTE — H&P PST ADULT - RS GEN PE MLT RESP DETAILS PC
breath sounds equal/good air movement/clear to auscultation bilaterally/normal/airway patent/respirations non-labored

## 2020-11-04 ENCOUNTER — NON-APPOINTMENT (OUTPATIENT)
Age: 70
End: 2020-11-04

## 2020-11-04 ENCOUNTER — APPOINTMENT (OUTPATIENT)
Dept: INTERNAL MEDICINE | Facility: CLINIC | Age: 70
End: 2020-11-04
Payer: MEDICARE

## 2020-11-04 VITALS
WEIGHT: 162 LBS | BODY MASS INDEX: 24.55 KG/M2 | HEART RATE: 110 BPM | OXYGEN SATURATION: 100 % | RESPIRATION RATE: 17 BRPM | TEMPERATURE: 98.1 F | HEIGHT: 68 IN

## 2020-11-04 VITALS — DIASTOLIC BLOOD PRESSURE: 80 MMHG | SYSTOLIC BLOOD PRESSURE: 136 MMHG

## 2020-11-04 DIAGNOSIS — Z01.818 ENCOUNTER FOR OTHER PREPROCEDURAL EXAMINATION: ICD-10-CM

## 2020-11-04 LAB
CULTURE RESULTS: SIGNIFICANT CHANGE UP
SPECIMEN SOURCE: SIGNIFICANT CHANGE UP

## 2020-11-04 PROCEDURE — 99214 OFFICE O/P EST MOD 30 MIN: CPT

## 2020-11-04 RX ORDER — AMOXICILLIN AND CLAVULANATE POTASSIUM 500; 125 MG/1; 1/1
TABLET, FILM COATED ORAL
Refills: 0 | Status: DISCONTINUED | COMMUNITY
End: 2020-11-04

## 2020-11-04 RX ORDER — HYDROCHLOROTHIAZIDE 12.5 MG/1
12.5 TABLET ORAL
Qty: 90 | Refills: 0 | Status: DISCONTINUED | COMMUNITY
Start: 2020-04-23 | End: 2020-11-04

## 2020-11-06 PROBLEM — I10 ESSENTIAL (PRIMARY) HYPERTENSION: Chronic | Status: ACTIVE | Noted: 2020-11-03

## 2020-11-06 PROBLEM — C50.919 MALIGNANT NEOPLASM OF UNSPECIFIED SITE OF UNSPECIFIED FEMALE BREAST: Chronic | Status: ACTIVE | Noted: 2020-11-03

## 2020-11-06 PROBLEM — Z86.2 PERSONAL HISTORY OF DISEASES OF THE BLOOD AND BLOOD-FORMING ORGANS AND CERTAIN DISORDERS INVOLVING THE IMMUNE MECHANISM: Chronic | Status: ACTIVE | Noted: 2020-11-03

## 2020-11-07 ENCOUNTER — OUTPATIENT (OUTPATIENT)
Dept: OUTPATIENT SERVICES | Facility: HOSPITAL | Age: 70
LOS: 1 days | End: 2020-11-07

## 2020-11-07 DIAGNOSIS — Z11.59 ENCOUNTER FOR SCREENING FOR OTHER VIRAL DISEASES: ICD-10-CM

## 2020-11-07 DIAGNOSIS — Z98.890 OTHER SPECIFIED POSTPROCEDURAL STATES: Chronic | ICD-10-CM

## 2020-11-07 DIAGNOSIS — Z90.79 ACQUIRED ABSENCE OF OTHER GENITAL ORGAN(S): Chronic | ICD-10-CM

## 2020-11-07 DIAGNOSIS — Z98.891 HISTORY OF UTERINE SCAR FROM PREVIOUS SURGERY: Chronic | ICD-10-CM

## 2020-11-07 LAB — SARS-COV-2 RNA SPEC QL NAA+PROBE: SIGNIFICANT CHANGE UP

## 2020-11-09 ENCOUNTER — TRANSCRIPTION ENCOUNTER (OUTPATIENT)
Age: 70
End: 2020-11-09

## 2020-11-09 LAB
ALBUMIN SERPL ELPH-MCNC: 4.7 G/DL
ALP BLD-CCNC: 83 U/L
ALT SERPL-CCNC: 17 U/L
ANION GAP SERPL CALC-SCNC: 12 MMOL/L
AST SERPL-CCNC: 15 U/L
BILIRUB SERPL-MCNC: 0.5 MG/DL
BUN SERPL-MCNC: 10 MG/DL
CALCIUM SERPL-MCNC: 10.2 MG/DL
CHLORIDE SERPL-SCNC: 97 MMOL/L
CO2 SERPL-SCNC: 27 MMOL/L
CREAT SERPL-MCNC: 0.6 MG/DL
GLUCOSE SERPL-MCNC: 96 MG/DL
POTASSIUM SERPL-SCNC: 4.8 MMOL/L
PROT SERPL-MCNC: 7.7 G/DL
SODIUM SERPL-SCNC: 136 MMOL/L

## 2020-11-09 NOTE — RESULTS/DATA
[] : results reviewed [de-identified] : mild anemia [de-identified] : chronic low sodium - to be repeated [de-identified] : NSR  [de-identified] : REPEAT CMP NORMAL SODIUM 11/7/2020

## 2020-11-09 NOTE — ASSESSMENT
[High Risk Surgery - Intraperitoneal, Intrathoracic or Supringuinal Vascular Procedures] : High Risk Surgery - Intraperitoneal, Intrathoracic or Supringuinal Vascular Procedures - No (0) [Ischemic Heart Disease] : Ischemic Heart Disease - No (0) [Congestive Heart Failure] : Congestive Heart Failure - No (0) [Prior Cerebrovascular Accident or TIA] : Prior Cerebrovascular Accident or TIA - No (0) [Creatinine >= 2mg/dL (1 Point)] : Creatinine >= 2mg/dL - No (0) [Insulin-dependent Diabetic (1 Point)] : Insulin-dependent Diabetic - No (0) [0] : 0 , RCRI Class: I, Risk of Post-Op Cardiac Complications: 3.9%, 95% CI for Risk Estimate: 2.8% - 5.4% [Patient Optimized for Surgery] : Patient optimized for surgery [No Further Testing Recommended] : no further testing recommended [Continue medications as is] : Continue current medications [As per surgery] : as per surgery [FreeTextEntry4] : Pt with chronic low sodium - possibly diuretic related so will discontinue HCTZ add Carvedilol\par \par 11/9/2020 REPEAT CMP NORMAL SODIUM

## 2020-11-09 NOTE — HISTORY OF PRESENT ILLNESS
[No Pertinent Cardiac History] : no history of aortic stenosis, atrial fibrillation, coronary artery disease, recent myocardial infarction, or implantable device/pacemaker [No Pertinent Pulmonary History] : no history of asthma, COPD, sleep apnea, or smoking [No Adverse Anesthesia Reaction] : no adverse anesthesia reaction in self or family member [(Patient denies any chest pain, claudication, dyspnea on exertion, orthopnea, palpitations or syncope)] : Patient denies any chest pain, claudication, dyspnea on exertion, orthopnea, palpitations or syncope [Aortic Stenosis] : no aortic stenosis [Atrial Fibrillation] : no atrial fibrillation [Coronary Artery Disease] : no coronary artery disease [Recent Myocardial Infarction] : no recent myocardial infarction [Implantable Device/Pacemaker] : no implantable device/pacemaker [Asthma] : no asthma [COPD] : no COPD [Sleep Apnea] : no sleep apnea [Smoker] : not a smoker [Family Member] : no family member with adverse anesthesia reaction/sudden death [Self] : no previous adverse anesthesia reaction [Chronic Anticoagulation] : no chronic anticoagulation [Chronic Kidney Disease] : no chronic kidney disease [Diabetes] : no diabetes [Excellent (>10 METs)] : Excellent (>10 METs) [FreeTextEntry1] : Parial colectomy (diverticulitis//abcess) and right salpigoopherectomy and lysis of adhesions/possible MARIANELA [FreeTextEntry2] : 11/10/2020 [FreeTextEntry3] : Catina Love/Timo [FreeTextEntry4] : Scheduled for complex surgery with

## 2020-11-10 ENCOUNTER — INPATIENT (INPATIENT)
Facility: HOSPITAL | Age: 70
LOS: 12 days | Discharge: HOME HEALTH | DRG: 329 | End: 2020-11-23
Attending: SURGERY | Admitting: SURGERY
Payer: MEDICARE

## 2020-11-10 ENCOUNTER — APPOINTMENT (OUTPATIENT)
Dept: COLORECTAL SURGERY | Facility: HOSPITAL | Age: 70
End: 2020-11-10
Payer: MEDICARE

## 2020-11-10 ENCOUNTER — RESULT REVIEW (OUTPATIENT)
Age: 70
End: 2020-11-10

## 2020-11-10 ENCOUNTER — APPOINTMENT (OUTPATIENT)
Dept: GYNECOLOGIC ONCOLOGY | Facility: HOSPITAL | Age: 70
End: 2020-11-10

## 2020-11-10 VITALS
TEMPERATURE: 98 F | SYSTOLIC BLOOD PRESSURE: 159 MMHG | HEIGHT: 67 IN | DIASTOLIC BLOOD PRESSURE: 86 MMHG | OXYGEN SATURATION: 98 % | HEART RATE: 90 BPM | RESPIRATION RATE: 17 BRPM | WEIGHT: 153 LBS

## 2020-11-10 DIAGNOSIS — Z98.890 OTHER SPECIFIED POSTPROCEDURAL STATES: Chronic | ICD-10-CM

## 2020-11-10 DIAGNOSIS — Z90.79 ACQUIRED ABSENCE OF OTHER GENITAL ORGAN(S): Chronic | ICD-10-CM

## 2020-11-10 DIAGNOSIS — K57.32 DIVERTICULITIS OF LARGE INTESTINE WITHOUT PERFORATION OR ABSCESS WITHOUT BLEEDING: ICD-10-CM

## 2020-11-10 DIAGNOSIS — Z98.891 HISTORY OF UTERINE SCAR FROM PREVIOUS SURGERY: Chronic | ICD-10-CM

## 2020-11-10 DIAGNOSIS — K57.92 DIVERTICULITIS OF INTESTINE, PART UNSPECIFIED, WITHOUT PERFORATION OR ABSCESS WITHOUT BLEEDING: ICD-10-CM

## 2020-11-10 LAB
ANION GAP SERPL CALC-SCNC: 14 MMOL/L — SIGNIFICANT CHANGE UP (ref 5–17)
BUN SERPL-MCNC: 6 MG/DL — LOW (ref 7–23)
CALCIUM SERPL-MCNC: 7.9 MG/DL — LOW (ref 8.4–10.5)
CHLORIDE SERPL-SCNC: 98 MMOL/L — SIGNIFICANT CHANGE UP (ref 96–108)
CO2 SERPL-SCNC: 22 MMOL/L — SIGNIFICANT CHANGE UP (ref 22–31)
CREAT SERPL-MCNC: 0.47 MG/DL — LOW (ref 0.5–1.3)
GLUCOSE BLDC GLUCOMTR-MCNC: 180 MG/DL — HIGH (ref 70–99)
GLUCOSE SERPL-MCNC: 158 MG/DL — HIGH (ref 70–99)
HCT VFR BLD CALC: 29.1 % — LOW (ref 34.5–45)
HGB BLD-MCNC: 9.2 G/DL — LOW (ref 11.5–15.5)
MAGNESIUM SERPL-MCNC: 1.9 MG/DL — SIGNIFICANT CHANGE UP (ref 1.6–2.6)
MCHC RBC-ENTMCNC: 18.5 PG — LOW (ref 27–34)
MCHC RBC-ENTMCNC: 31.6 GM/DL — LOW (ref 32–36)
MCV RBC AUTO: 58.6 FL — LOW (ref 80–100)
NRBC # BLD: 0 /100 WBCS — SIGNIFICANT CHANGE UP (ref 0–0)
PHOSPHATE SERPL-MCNC: 3.4 MG/DL — SIGNIFICANT CHANGE UP (ref 2.5–4.5)
PLATELET # BLD AUTO: 416 K/UL — HIGH (ref 150–400)
POTASSIUM SERPL-MCNC: 3.7 MMOL/L — SIGNIFICANT CHANGE UP (ref 3.5–5.3)
POTASSIUM SERPL-SCNC: 3.7 MMOL/L — SIGNIFICANT CHANGE UP (ref 3.5–5.3)
RBC # BLD: 4.97 M/UL — SIGNIFICANT CHANGE UP (ref 3.8–5.2)
RBC # FLD: 19 % — HIGH (ref 10.3–14.5)
SODIUM SERPL-SCNC: 134 MMOL/L — LOW (ref 135–145)
WBC # BLD: 16.37 K/UL — HIGH (ref 3.8–10.5)
WBC # FLD AUTO: 16.37 K/UL — HIGH (ref 3.8–10.5)

## 2020-11-10 PROCEDURE — 88305 TISSUE EXAM BY PATHOLOGIST: CPT | Mod: 26

## 2020-11-10 PROCEDURE — 45300 PROCTOSIGMOIDOSCOPY DX: CPT | Mod: 59

## 2020-11-10 PROCEDURE — 58120 DILATION AND CURETTAGE: CPT

## 2020-11-10 PROCEDURE — 45020 DRAINAGE OF RECTAL ABSCESS: CPT

## 2020-11-10 PROCEDURE — 88307 TISSUE EXAM BY PATHOLOGIST: CPT | Mod: 26

## 2020-11-10 PROCEDURE — 88331 PATH CONSLTJ SURG 1 BLK 1SPC: CPT | Mod: 26

## 2020-11-10 PROCEDURE — 52005 CYSTO W/URTRL CATHJ: CPT

## 2020-11-10 PROCEDURE — 44310 ILEOSTOMY/JEJUNOSTOMY: CPT

## 2020-11-10 PROCEDURE — 44145 PARTIAL REMOVAL OF COLON: CPT

## 2020-11-10 PROCEDURE — 74018 RADEX ABDOMEN 1 VIEW: CPT | Mod: 26

## 2020-11-10 PROCEDURE — 49203: CPT

## 2020-11-10 RX ORDER — SODIUM CHLORIDE 9 MG/ML
1000 INJECTION, SOLUTION INTRAVENOUS
Refills: 0 | Status: DISCONTINUED | OUTPATIENT
Start: 2020-11-10 | End: 2020-11-11

## 2020-11-10 RX ORDER — OXYCODONE HYDROCHLORIDE 5 MG/1
5 TABLET ORAL
Refills: 0 | Status: DISCONTINUED | OUTPATIENT
Start: 2020-11-10 | End: 2020-11-13

## 2020-11-10 RX ORDER — OXYCODONE HYDROCHLORIDE 5 MG/1
10 TABLET ORAL
Refills: 0 | Status: DISCONTINUED | OUTPATIENT
Start: 2020-11-10 | End: 2020-11-13

## 2020-11-10 RX ORDER — HYDROMORPHONE HYDROCHLORIDE 2 MG/ML
1 INJECTION INTRAMUSCULAR; INTRAVENOUS; SUBCUTANEOUS
Refills: 0 | Status: DISCONTINUED | OUTPATIENT
Start: 2020-11-10 | End: 2020-11-11

## 2020-11-10 RX ORDER — NALOXONE HYDROCHLORIDE 4 MG/.1ML
0.1 SPRAY NASAL
Refills: 0 | Status: DISCONTINUED | OUTPATIENT
Start: 2020-11-10 | End: 2020-11-12

## 2020-11-10 RX ORDER — MORPHINE SULFATE 50 MG/1
0.2 CAPSULE, EXTENDED RELEASE ORAL ONCE
Refills: 0 | Status: DISCONTINUED | OUTPATIENT
Start: 2020-11-10 | End: 2020-11-11

## 2020-11-10 RX ORDER — ACETAMINOPHEN 500 MG
1000 TABLET ORAL EVERY 6 HOURS
Refills: 0 | Status: COMPLETED | OUTPATIENT
Start: 2020-11-10 | End: 2020-11-11

## 2020-11-10 RX ORDER — ENOXAPARIN SODIUM 100 MG/ML
40 INJECTION SUBCUTANEOUS DAILY
Refills: 0 | Status: DISCONTINUED | OUTPATIENT
Start: 2020-11-10 | End: 2020-11-23

## 2020-11-10 RX ORDER — CELECOXIB 200 MG/1
400 CAPSULE ORAL ONCE
Refills: 0 | Status: COMPLETED | OUTPATIENT
Start: 2020-11-10 | End: 2020-11-10

## 2020-11-10 RX ORDER — CARVEDILOL PHOSPHATE 80 MG/1
6.25 CAPSULE, EXTENDED RELEASE ORAL EVERY 12 HOURS
Refills: 0 | Status: DISCONTINUED | OUTPATIENT
Start: 2020-11-10 | End: 2020-11-23

## 2020-11-10 RX ORDER — GABAPENTIN 400 MG/1
600 CAPSULE ORAL ONCE
Refills: 0 | Status: COMPLETED | OUTPATIENT
Start: 2020-11-10 | End: 2020-11-10

## 2020-11-10 RX ORDER — HYDROMORPHONE HYDROCHLORIDE 2 MG/ML
0.25 INJECTION INTRAMUSCULAR; INTRAVENOUS; SUBCUTANEOUS EVERY 4 HOURS
Refills: 0 | Status: DISCONTINUED | OUTPATIENT
Start: 2020-11-10 | End: 2020-11-11

## 2020-11-10 RX ORDER — ONDANSETRON 8 MG/1
4 TABLET, FILM COATED ORAL EVERY 6 HOURS
Refills: 0 | Status: DISCONTINUED | OUTPATIENT
Start: 2020-11-10 | End: 2020-11-12

## 2020-11-10 RX ORDER — KETOROLAC TROMETHAMINE 30 MG/ML
15 SYRINGE (ML) INJECTION EVERY 6 HOURS
Refills: 0 | Status: DISCONTINUED | OUTPATIENT
Start: 2020-11-10 | End: 2020-11-12

## 2020-11-10 RX ADMIN — CELECOXIB 400 MILLIGRAM(S): 200 CAPSULE ORAL at 11:47

## 2020-11-10 RX ADMIN — GABAPENTIN 600 MILLIGRAM(S): 400 CAPSULE ORAL at 11:47

## 2020-11-10 RX ADMIN — SODIUM CHLORIDE 40 MILLILITER(S): 9 INJECTION, SOLUTION INTRAVENOUS at 21:51

## 2020-11-10 NOTE — BRIEF OPERATIVE NOTE - NSICDXBRIEFPROCEDURE_GEN_ALL_CORE_FT
PROCEDURES:  D&C (dilatation and curettage, scraping of uterus) 10-Nov-2020 19:46:33  Braulio Greenwood  Open diagnostic excision of peritoneum 10-Nov-2020 19:46:14  Braulio Greenwood  Right salpingoophorectomy 10-Nov-2020 19:45:36  Braulio Greenwood  
PROCEDURES:  Open creation of diverting loop ileostomy 10-Nov-2020 21:39:17  Francisca Reddy  Open low anterior resection of colon 10-Nov-2020 21:38:56  Francisca Reddy  Open diagnostic excision of peritoneum 10-Nov-2020 19:46:14  Braulio Greenwood  Right salpingoophorectomy 10-Nov-2020 19:45:36  Braulio Greenwood

## 2020-11-10 NOTE — BRIEF OPERATIVE NOTE - IV INFUSIONS - CRYSTALLOIDS
4L
Pt continues to be ambivalent regarding suicidality, says "I don't want life to be like this", denying current active ideation/intent/plan

## 2020-11-10 NOTE — BRIEF OPERATIVE NOTE - NSICDXBRIEFPREOP_GEN_ALL_CORE_FT
PRE-OP DIAGNOSIS:  Diverticulitis of intestine without perforation or abscess without bleeding 10-Nov-2020 19:46:58  Braulio Greenwood  
PRE-OP DIAGNOSIS:  Diverticulitis of intestine without perforation or abscess without bleeding 10-Nov-2020 19:46:58  Braulio Greenwood

## 2020-11-10 NOTE — BRIEF OPERATIVE NOTE - NSICDXBRIEFPOSTOP_GEN_ALL_CORE_FT
POST-OP DIAGNOSIS:  Peritoneal lesion 10-Nov-2020 19:47:47  Braulio Greenwood  Pelvic adhesions 10-Nov-2020 19:47:39  Braulio Greenwood  Diverticulitis of intestine w/o perforation or abscess w/o bleeding 10-Nov-2020 19:47:27  Braulio Greenwood  
POST-OP DIAGNOSIS:  Diverticulitis of intestine w/o perforation or abscess w/o bleeding 10-Nov-2020 19:47:27  Braulio Greenwood

## 2020-11-11 ENCOUNTER — TRANSCRIPTION ENCOUNTER (OUTPATIENT)
Age: 70
End: 2020-11-11

## 2020-11-11 LAB
ANION GAP SERPL CALC-SCNC: 10 MMOL/L — SIGNIFICANT CHANGE UP (ref 5–17)
ANION GAP SERPL CALC-SCNC: 9 MMOL/L — SIGNIFICANT CHANGE UP (ref 5–17)
BUN SERPL-MCNC: 15 MG/DL — SIGNIFICANT CHANGE UP (ref 7–23)
BUN SERPL-MCNC: 8 MG/DL — SIGNIFICANT CHANGE UP (ref 7–23)
CALCIUM SERPL-MCNC: 8.3 MG/DL — LOW (ref 8.4–10.5)
CALCIUM SERPL-MCNC: 8.6 MG/DL — SIGNIFICANT CHANGE UP (ref 8.4–10.5)
CHLORIDE SERPL-SCNC: 93 MMOL/L — LOW (ref 96–108)
CHLORIDE SERPL-SCNC: 97 MMOL/L — SIGNIFICANT CHANGE UP (ref 96–108)
CO2 SERPL-SCNC: 26 MMOL/L — SIGNIFICANT CHANGE UP (ref 22–31)
CO2 SERPL-SCNC: 27 MMOL/L — SIGNIFICANT CHANGE UP (ref 22–31)
CREAT SERPL-MCNC: 0.53 MG/DL — SIGNIFICANT CHANGE UP (ref 0.5–1.3)
CREAT SERPL-MCNC: 0.77 MG/DL — SIGNIFICANT CHANGE UP (ref 0.5–1.3)
GLUCOSE SERPL-MCNC: 146 MG/DL — HIGH (ref 70–99)
GLUCOSE SERPL-MCNC: 157 MG/DL — HIGH (ref 70–99)
HCT VFR BLD CALC: 26.3 % — LOW (ref 34.5–45)
HCT VFR BLD CALC: 27.9 % — LOW (ref 34.5–45)
HGB BLD-MCNC: 8.2 G/DL — LOW (ref 11.5–15.5)
HGB BLD-MCNC: 8.6 G/DL — LOW (ref 11.5–15.5)
MAGNESIUM SERPL-MCNC: 2.1 MG/DL — SIGNIFICANT CHANGE UP (ref 1.6–2.6)
MCHC RBC-ENTMCNC: 18.2 PG — LOW (ref 27–34)
MCHC RBC-ENTMCNC: 18.5 PG — LOW (ref 27–34)
MCHC RBC-ENTMCNC: 30.8 GM/DL — LOW (ref 32–36)
MCHC RBC-ENTMCNC: 31.2 GM/DL — LOW (ref 32–36)
MCV RBC AUTO: 59 FL — LOW (ref 80–100)
MCV RBC AUTO: 59.2 FL — LOW (ref 80–100)
NRBC # BLD: 0 /100 WBCS — SIGNIFICANT CHANGE UP (ref 0–0)
NRBC # BLD: 0 /100 WBCS — SIGNIFICANT CHANGE UP (ref 0–0)
PHOSPHATE SERPL-MCNC: 4.3 MG/DL — SIGNIFICANT CHANGE UP (ref 2.5–4.5)
PLATELET # BLD AUTO: 425 K/UL — HIGH (ref 150–400)
PLATELET # BLD AUTO: 466 K/UL — HIGH (ref 150–400)
POTASSIUM SERPL-MCNC: 4.2 MMOL/L — SIGNIFICANT CHANGE UP (ref 3.5–5.3)
POTASSIUM SERPL-MCNC: 4.2 MMOL/L — SIGNIFICANT CHANGE UP (ref 3.5–5.3)
POTASSIUM SERPL-SCNC: 4.2 MMOL/L — SIGNIFICANT CHANGE UP (ref 3.5–5.3)
POTASSIUM SERPL-SCNC: 4.2 MMOL/L — SIGNIFICANT CHANGE UP (ref 3.5–5.3)
RBC # BLD: 4.44 M/UL — SIGNIFICANT CHANGE UP (ref 3.8–5.2)
RBC # BLD: 4.73 M/UL — SIGNIFICANT CHANGE UP (ref 3.8–5.2)
RBC # FLD: 18.8 % — HIGH (ref 10.3–14.5)
RBC # FLD: 19.1 % — HIGH (ref 10.3–14.5)
SODIUM SERPL-SCNC: 129 MMOL/L — LOW (ref 135–145)
SODIUM SERPL-SCNC: 133 MMOL/L — LOW (ref 135–145)
WBC # BLD: 10.48 K/UL — SIGNIFICANT CHANGE UP (ref 3.8–10.5)
WBC # BLD: 14.43 K/UL — HIGH (ref 3.8–10.5)
WBC # FLD AUTO: 10.48 K/UL — SIGNIFICANT CHANGE UP (ref 3.8–10.5)
WBC # FLD AUTO: 14.43 K/UL — HIGH (ref 3.8–10.5)

## 2020-11-11 RX ORDER — SODIUM CHLORIDE 9 MG/ML
1000 INJECTION INTRAMUSCULAR; INTRAVENOUS; SUBCUTANEOUS ONCE
Refills: 0 | Status: COMPLETED | OUTPATIENT
Start: 2020-11-11 | End: 2020-11-11

## 2020-11-11 RX ORDER — SODIUM CHLORIDE 9 MG/ML
1000 INJECTION, SOLUTION INTRAVENOUS
Refills: 0 | Status: DISCONTINUED | OUTPATIENT
Start: 2020-11-11 | End: 2020-11-12

## 2020-11-11 RX ADMIN — Medication 15 MILLIGRAM(S): at 12:40

## 2020-11-11 RX ADMIN — Medication 1000 MILLIGRAM(S): at 10:39

## 2020-11-11 RX ADMIN — ENOXAPARIN SODIUM 40 MILLIGRAM(S): 100 INJECTION SUBCUTANEOUS at 12:11

## 2020-11-11 RX ADMIN — CARVEDILOL PHOSPHATE 6.25 MILLIGRAM(S): 80 CAPSULE, EXTENDED RELEASE ORAL at 17:47

## 2020-11-11 RX ADMIN — Medication 400 MILLIGRAM(S): at 10:09

## 2020-11-11 RX ADMIN — Medication 15 MILLIGRAM(S): at 17:47

## 2020-11-11 RX ADMIN — Medication 15 MILLIGRAM(S): at 12:10

## 2020-11-11 RX ADMIN — Medication 15 MILLIGRAM(S): at 07:02

## 2020-11-11 RX ADMIN — Medication 1000 MILLIGRAM(S): at 16:27

## 2020-11-11 RX ADMIN — Medication 400 MILLIGRAM(S): at 21:37

## 2020-11-11 RX ADMIN — Medication 15 MILLIGRAM(S): at 18:17

## 2020-11-11 RX ADMIN — Medication 1000 MILLIGRAM(S): at 22:07

## 2020-11-11 RX ADMIN — Medication 1000 MILLIGRAM(S): at 03:17

## 2020-11-11 RX ADMIN — SODIUM CHLORIDE 40 MILLILITER(S): 9 INJECTION, SOLUTION INTRAVENOUS at 10:09

## 2020-11-11 RX ADMIN — Medication 400 MILLIGRAM(S): at 02:47

## 2020-11-11 RX ADMIN — Medication 400 MILLIGRAM(S): at 15:57

## 2020-11-11 RX ADMIN — Medication 15 MILLIGRAM(S): at 06:32

## 2020-11-11 NOTE — DISCHARGE NOTE PROVIDER - PROVIDER TOKENS
PROVIDER:[TOKEN:[[id number]]] PROVIDER:[TOKEN:[[id number]]],PROVIDER:[TOKEN:[[id number]],FOLLOWUP:[2 weeks]]

## 2020-11-11 NOTE — DIETITIAN INITIAL EVALUATION ADULT. - ORAL INTAKE PTA/DIET HISTORY
Pt reports good PO intake and appetite PTA, consumes 3 meals per day consisting of a variety of foods. Has been following a low fiber/residue diet for the last few weeks and reports good tolerance and reduction in diverticulitis symptoms. Confirms NKFA. Takes vitamin C, vitamin D, multivitamin, Probiotic. Pt denies chewing/swallowing difficulty, nausea, vomiting, diarrhea, constipation PTA.

## 2020-11-11 NOTE — DISCHARGE NOTE PROVIDER - NSDCMRMEDTOKEN_GEN_ALL_CORE_FT
aspirin 81 mg oral tablet, chewable: 1 tab(s) orally once a day  carvedilol 6.25 mg oral tablet: 1 tab(s) orally 2 times a day  Diflucan 150 mg oral tablet: 1 tab(s) orally once  hydroCHLOROthiazide 12.5 mg oral capsule: 1 cap(s) orally once a day  ibuprofen 400 mg oral tablet: 1 tab(s) orally every 6 hours, As needed, Mild Pain (1 - 3)  Multiple Vitamins oral gum: 2 gum orally  olmesartan 40 mg oral tablet: 1 tab(s) orally once a day  Probiotic Formula oral capsule: 1 cap(s) orally once a day  Vitamin C 1000 mg oral tablet: 1 tab(s) orally once a day  Vitamin D3: 1400 unit(s) orally once a day   acetaminophen 325 mg oral tablet: 3 tab(s) orally every 6 hours, As needed, Mild Pain (1 - 3)  aspirin 81 mg oral tablet, chewable: 1 tab(s) orally once a day  carvedilol 6.25 mg oral tablet: 1 tab(s) orally 2 times a day  hydroCHLOROthiazide 12.5 mg oral capsule: 1 cap(s) orally once a day  ibuprofen 400 mg oral tablet: 1 tab(s) orally every 6 hours  Multiple Vitamins oral gum: 2 gum orally  olmesartan 40 mg oral tablet: 1 tab(s) orally once a day  Probiotic Formula oral capsule: 1 cap(s) orally once a day  Vitamin C 1000 mg oral tablet: 1 tab(s) orally once a day  Vitamin D3: 1400 unit(s) orally once a day   acetaminophen 325 mg oral tablet: 3 tab(s) orally every 6 hours, As needed, Mild Pain (1 - 3)  aspirin 81 mg oral tablet, chewable: 1 tab(s) orally once a day  carvedilol 6.25 mg oral tablet: 1 tab(s) orally 2 times a day  hydroCHLOROthiazide 12.5 mg oral capsule: 1 cap(s) orally once a day  ibuprofen 400 mg oral tablet: 1 tab(s) orally every 6 hours  loperamide 2 mg oral capsule: 1 cap(s) orally 2 times a day  Multiple Vitamins oral gum: 2 gum orally  olmesartan 40 mg oral tablet: 1 tab(s) orally once a day  oxyCODONE 5 mg oral tablet: 1-2  tab(s) orally every 4-6 hours, As Needed MDD:6  Probiotic Formula oral capsule: 1 cap(s) orally once a day  Vitamin C 1000 mg oral tablet: 1 tab(s) orally once a day  Vitamin D3: 1400 unit(s) orally once a day

## 2020-11-11 NOTE — PROGRESS NOTE ADULT - SUBJECTIVE AND OBJECTIVE BOX
AJ ESPINOZA [id number]    R4 GYN Progress Note    POD#1   HD#2    Patient seen and examined at bedside.  No acute events overnight. No acute complaints.  Pain well controlled with IV medications, also s/p duramorph spinal.  Denies CP, SOB, N/V, fevers, and chills.    Vital Signs Last 24 Hours    UOP: 480cc over 9 hours, 53cc/hour, adequate is 35 cc.    T(C): 36.5 (11-11-20 @ 06:17), Max: 36.7 (11-10-20 @ 21:17)  HR: 70 (11-11-20 @ 06:17) (65 - 93)  BP: 106/65 (11-11-20 @ 06:17) (90/51 - 159/86)  RR: 18 (11-11-20 @ 06:17) (16 - 18)  SpO2: 97% (11-11-20 @ 06:17) (96% - 99%)    I&O's Summary    10 Nov 2020 07:01  -  11 Nov 2020 07:00  --------------------------------------------------------  IN: 220 mL / OUT: 530 mL / NET: -310 mL        Physical Exam:  General: NAD  CV: RR, S1, S2, no M/R/G  Lungs: CTA b/l  Abdomen: Soft, appropriately tender, stoma pink with some serous drainage in bag  Incision:  midline incision with bandage in place, clean/dry/intact  Ext: No pain or swelling     Labs:                        9.2    16.37 )-----------( 416      ( 10 Nov 2020 22:04 )             29.1   baso x      eos x      imm gran x      lymph x      mono x      poly x          MEDICATIONS  (STANDING):  acetaminophen  IVPB .. 1000 milliGRAM(s) IV Intermittent every 6 hours  carvedilol 6.25 milliGRAM(s) Oral every 12 hours  enoxaparin Injectable 40 milliGRAM(s) SubCutaneous daily  ketorolac   Injectable 15 milliGRAM(s) IV Push every 6 hours  lactated ringers. 1000 milliLiter(s) (40 mL/Hr) IV Continuous <Continuous>  morphine PF Spinal 0.2 milliGRAM(s) IntraThecal. once    MEDICATIONS  (PRN):  HYDROmorphone  Injectable 0.25 milliGRAM(s) IV Push every 4 hours PRN Severe Pain (7 - 10)  HYDROmorphone  Injectable 1 milliGRAM(s) IV Push every 3 hours PRN Severe Pain (7 - 10)  naloxone Injectable 0.1 milliGRAM(s) IV Push every 3 minutes PRN For ANY of the following changes in patient status:  A. RR LESS THAN 10 breaths per minute, B. Oxygen saturation LESS THAN 90%, C. Sedation score of 6  ondansetron Injectable 4 milliGRAM(s) IV Push every 6 hours PRN Nausea  oxyCODONE    IR 5 milliGRAM(s) Oral every 3 hours PRN Mild Pain (1 - 3)  oxyCODONE    IR 10 milliGRAM(s) Oral every 3 hours PRN Moderate Pain (4 - 6)

## 2020-11-11 NOTE — DIETITIAN INITIAL EVALUATION ADULT. - ADD RECOMMEND
1) Medical team to advance diet when medically feasible. Consider advancing low fiber diet as tolerated. 2) Diet education provided, reinforce as needed. 3) RD to remain available and follow-up as medically appropriate.

## 2020-11-11 NOTE — PROGRESS NOTE ADULT - ASSESSMENT
The patient is a 70y Female who is now POD1 from an LAR with diverting loop ileostomy, r salpingoophorectomy, d+c, recovering well on floor.    - ERP  - Pain control as needed, continue multimodal pain control  - tolerating sips  - Maintain aguiar  - DVT ppx  - OOB and ambulating as tolerated  - F/u AM labs    Red [id number]

## 2020-11-11 NOTE — DISCHARGE NOTE PROVIDER - CARE PROVIDER_API CALL
Diony Marx  COLON/RECTAL SURGERY  [address] [place] [address], [address]  [place] [address]  Phone: [phone]  Fax: [phone]  Follow Up Time:    Diony Marx  COLON/RECTAL SURGERY  [address] [place] [address], [address]  [place] [address]  Phone: [phone]  Fax: [phone]  Follow Up Time:     Braulio Greenwood)  Gynecologic Oncology; Obstetrics and Gynecology  [address] [place] [address]  Phone: [phone]  Fax: [phone]  Follow Up Time: 2 weeks

## 2020-11-11 NOTE — DISCHARGE NOTE PROVIDER - CARE PROVIDERS DIRECT ADDRESSES
,[email or web address]

## 2020-11-11 NOTE — PROGRESS NOTE ADULT - SUBJECTIVE AND OBJECTIVE BOX
GENERAL SURGERY PROGRESS NOTE    SUBJECTIVE  Patient seen and examined. No acute events overnight. Reports no nausea, vomiting, she is producing urine, have been ambulating and out of bed.  Midline dressing changed this am. Denies fever, chills, SOB, chest pain.         OBJECTIVE    PHYSICAL EXAM  General: Appears well, NAD  CHEST: breathing comfortably  CV: appears well perfused  Abdomen: soft, nontender, nondistended, no rebound or guarding, incision packed, covered with gauze, some oozing between staples in superior portion of incision.  Extremities: Grossly symmetric    T(C): 36.5 (11-11-20 @ 06:17), Max: 36.7 (11-10-20 @ 21:17)  HR: 70 (11-11-20 @ 06:17) (65 - 93)  BP: 106/65 (11-11-20 @ 06:17) (90/51 - 159/86)  RR: 18 (11-11-20 @ 06:17) (16 - 18)  SpO2: 97% (11-11-20 @ 06:17) (96% - 99%)    11-10-20 @ 07:01  -  11-11-20 @ 07:00  --------------------------------------------------------  IN: 500 mL / OUT: 530 mL / NET: -30 mL        MEDICATIONS  acetaminophen  IVPB .. 1000 milliGRAM(s) IV Intermittent every 6 hours  carvedilol 6.25 milliGRAM(s) Oral every 12 hours  enoxaparin Injectable 40 milliGRAM(s) SubCutaneous daily  ketorolac   Injectable 15 milliGRAM(s) IV Push every 6 hours  lactated ringers. 1000 milliLiter(s) IV Continuous <Continuous>  morphine PF Spinal 0.2 milliGRAM(s) IntraThecal. once  naloxone Injectable 0.1 milliGRAM(s) IV Push every 3 minutes PRN  ondansetron Injectable 4 milliGRAM(s) IV Push every 6 hours PRN  oxyCODONE    IR 5 milliGRAM(s) Oral every 3 hours PRN  oxyCODONE    IR 10 milliGRAM(s) Oral every 3 hours PRN      LABS                        8.6    10.48 )-----------( 425      ( 11 Nov 2020 06:46 )             27.9     11-11    133<L>  |  97  |  8   ----------------------------<  157<H>  4.2   |  26  |  0.53    Ca    8.3<L>      11 Nov 2020 06:46  Phos  4.3     11-11  Mg     2.1     11-11            RADIOLOGY & ADDITIONAL STUDIES

## 2020-11-11 NOTE — DIETITIAN INITIAL EVALUATION ADULT. - REASON FOR ADMISSION
This is a 70y Female who is now POD1 from an LAR with diverting loop ileostomy + right salpingoophorectomy.

## 2020-11-11 NOTE — DISCHARGE NOTE PROVIDER - NSDCHHNEEDSERVICE_GEN_ALL_CORE
Ostomy care and management/Other, specify.../Wound care and assessment Wound care and assessment/Other, specify.../Observation and assessment/Ostomy care and management/Teaching and training Observation and assessment/Ostomy care and management/Teaching and training

## 2020-11-11 NOTE — DISCHARGE NOTE PROVIDER - NSDCCPCAREPLAN_GEN_ALL_CORE_FT
PRINCIPAL DISCHARGE DIAGNOSIS  Diagnosis: Diverticulitis of intestine, part unspecified, without perforation or abscess without bleeding  Assessment and Plan of Treatment: WOUND CARE: Staples will be removed at follow up office visit.  You have 1 inch plain packing to be changed daily by a visiting nurse.  BATHING: Please do not submerge wound underwater. You may shower and/or sponge bathe.  ACTIVITY: No heavy lifting anything more than 10-15lbs or straining. Otherwise, you may return to your usual level of physical activity. If you are taking narcotic pain medication (such as Percocet), do NOT drive a car, operate machinery or make important decisions.  DIET: Low fiber diet  NOTIFY YOUR SURGEON IF: You have any bleeding that does not stop, any pus draining from your wound, any fever (over 100.4 F) or chills, persistent nausea/vomiting with inability to tolerate food or liquids, persistent diarrhea, or if your pain is not controlled on your discharge pain medications.  FOLLOW-UP:  1. Please call to make a follow-up appointment within one to two weeks of discharge with Dr. Marx.  2. Please follow up with your primary care physician in one week regarding your hospitalization.       PRINCIPAL DISCHARGE DIAGNOSIS  Diagnosis: Diverticulitis of intestine, part unspecified, without perforation or abscess without bleeding  Assessment and Plan of Treatment: WOUND CARE: Staples will be removed at follow up office visit. Place dry guaze over midline, no packing required.   BATHING: Please do not submerge wound underwater. You may shower and/or sponge bathe.  ACTIVITY: No heavy lifting anything more than 10-15lbs or straining. Otherwise, you may return to your usual level of physical activity. If you are taking narcotic pain medication (such as Percocet), do NOT drive a car, operate machinery or make important decisions.  NOTIFY YOUR SURGEON IF: You have any bleeding that does not stop, any pus draining from your wound, any fever (over 100.4 F) or chills, persistent nausea/vomiting with inability to tolerate food or liquids, persistent diarrhea, or if your pain is not controlled on your discharge pain medications.  FOLLOW-UP:  1. Please call to make a follow-up appointment within one to two weeks of discharge with Dr. Marx.      SECONDARY DISCHARGE DIAGNOSES  Diagnosis: HTN (hypertension)  Assessment and Plan of Treatment:

## 2020-11-11 NOTE — DISCHARGE NOTE PROVIDER - NSDCACTIVITY_GEN_ALL_CORE
Walking - Indoors allowed/No heavy lifting/straining/Walking - Outdoors allowed/Showering allowed/Do not drive or operate machinery/Do not make important decisions/Stairs allowed No heavy lifting/straining/Showering allowed/Do not make important decisions/Do not drive or operate machinery

## 2020-11-11 NOTE — DISCHARGE NOTE PROVIDER - HOSPITAL COURSE
70 yr old female with PMH HTN, Breast Ca (with radiation), s/p right breast lumpectomy, diverticulitis. Present today with recent (9/2020) hospital admission for complicated diverticulitis, CT scan revealed significant chronic, smoldering diverticulitis with some extraluminal air droplets and possible pericolonic fluid collections, and enlarged uterus. Pt asymptomatic today, states that she feels well. Pt scheduled for ERAS: Open anterior resection, possible total abdominal hysterectomy, possible right salpingectomy oophorectomy possible D&C on 11/10/20.  ON 11/10, pt s/p low anterior resection, diverting loop ileostomy, right salpingoophorectomy, d&c. Pt tolerated procedure well, was extubated and sent to pacu stable. POD1, PT eval: home with no needs.  PAtient began having GI fxn, tolerating clears.  On POD2, ______.      70 yr old female with PMH HTN, Breast Ca (with radiation), s/p right breast lumpectomy, diverticulitis. Present today with recent (9/2020) hospital admission for complicated diverticulitis, CT scan revealed significant chronic, smoldering diverticulitis with some extraluminal air droplets and possible pericolonic fluid collections, and enlarged uterus.    On 11/10, pt s/p ERAs low anterior resection, diverting loop ileostomy, right salpingoophorectomy, d&c. Pt tolerated procedure well, was extubated and sent to pacu stable. POD1, PT eval: home with no needs.  Patient began having GI fxn, tolerating clears.  However post op course complicated by ileus. Pt was made NPO x several days. A AXR on 11/12 showed Dilated small bowel loops, left abdomen, may be obstructed.   A CT on 11/19 showed No evidence of anastomotic leak.  Her diet was advanced.   At the time of discharge, the patient was hemodynamically stable, tolerating PO diet, voiding urine, passing stool, ambulating and was comfortable with adequate pain control. The patient was instructed to follow up with Dr. Marx and GYN within 1-2 weeks after discharge. The patient felt comfortable with discharge. The patient was discharged to home. The patient had no other issues.      Surgical Final Report    Final Diagnosis  1. Right adnexa, right salpingo-oophorectomy: - Ovary and fallopian tube with serositis, see comment    2. Peritoneal lesion, excision: - Mature adipose tissue with acute inflammation    3. Endometrium, curettings:  - Fragments of tiny squamous epithelium with rare glands, Negative for malignancy    4. Left colon and rectosigmoid, anterior resection: - Diverticulosis with diverticulitis featuring multiple diverticula, with diverticulitis, intramural/subserosal abscess formation, serositis, inflamed granulation tissue and dense fibrosis - Proximal and distal surgical resection margins, unremarkable - Reactive lymph nodes 70 yr old female with PMH HTN, Breast Ca (with radiation), s/p right breast lumpectomy, diverticulitis. Present today with recent (9/2020) hospital admission for complicated diverticulitis, CT scan revealed significant chronic, smoldering diverticulitis with some extraluminal air droplets and possible pericolonic fluid collections, and enlarged uterus.    On 11/10, pt s/p ERAs low anterior resection, diverting loop ileostomy, right salpingoophorectomy, d&c. Pt tolerated procedure well, was extubated and sent to pacu stable. POD1, PT eval: home with no needs.  Patient began having GI fxn, tolerating clears.  However post op course complicated by ileus. She was made NPO x several days. An AXR on 11/12 showed Dilated small bowel loops, left abdomen, may be obstructed.  A CT on 11/19 showed no evidence of anastomotic leak. GI function returned and her diet was advanced.  11/23/2020, At the time of discharge, the patient was hemodynamically stable, tolerating PO diet, voiding urine, passing stool, ambulating and was comfortable with adequate pain control. The patient was instructed to follow up with Dr. Marx and GYN within 1-2 weeks after discharge. The patient felt comfortable with discharge. The patient was discharged to home. The patient had no other issues.      Surgical Final Report  Final Diagnosis  1. Right adnexa, right salpingo-oophorectomy: - Ovary and fallopian tube with serositis, see comment    2. Peritoneal lesion, excision: - Mature adipose tissue with acute inflammation    3. Endometrium, curettings:  - Fragments of tiny squamous epithelium with rare glands, Negative for malignancy    4. Left colon and rectosigmoid, anterior resection: - Diverticulosis with diverticulitis featuring multiple diverticula, with diverticulitis, intramural/subserosal abscess formation, serositis, inflamed granulation tissue and dense fibrosis - Proximal and distal surgical resection margins, unremarkable - Reactive lymph nodes

## 2020-11-11 NOTE — DIETITIAN INITIAL EVALUATION ADULT. - OTHER INFO
Weights: pt reports UBW as ~ 175lbs, reports weight loss largely due to changes in dietary habits. Reports she has been trying to eat "healthier", has reduced consumption of concentrated sweets, fried foods and take out. Reports appetite has been good. States most recent weight was ~ 162lbs x < 1 week ago ( after bowel prep), current admit weight is ~ 153lbs however question accuracy as pt states weight is in the low 160's. Will continue to monitor.     Pt previously NPO, advanced to clear liquid diet this morning, has yet to try. No nausea, emesis noted. ostomy output: 50ml (11/11). Pt familiar with low fiber diet however now with new ileostomy. Pt amendable to diet education/review. Reviewed Ileostomy nutrition therapy/food list handout.). Discussed eating low-fiber foods, chewing foods well, small frequent meals high in protein & energy. Reviewed foods that may cause odors &/or gas, discussed foods that bulk stool and thin stool, and importance of adequate hydration. Patient with no nutrition-related questions at this time. Made aware RD remains available as needed.

## 2020-11-11 NOTE — CHART NOTE - NSCHARTNOTEFT_GEN_A_CORE
POST-OPERATIVE NOTE    Patient is s/p low anterior resection, diverting loop ileostomy, right salpingoophorectomy, d&c    Subjective:  Patient reports pain at the incisional site, controlled with medication  Denies chest pain, shortness of breath, nausea, vomiting  Is not yet passing gas, some succus coming through ostomy  Not yet urinating independently or ambulating independently    Vital Signs Last 24 Hrs  T(C): 36.4 (2020 03:20), Max: 36.7 (10 Nov 2020 21:17)  T(F): 97.6 (2020 03:20), Max: 98.1 (10 Nov 2020 21:17)  HR: 65 (2020 03:20) (65 - 93)  BP: 103/65 (2020 03:20) (90/51 - 159/86)  BP(mean): 65 (10 Nov 2020 23:30) (65 - 80)  RR: 18 (2020 03:20) (16 - 18)  SpO2: 97% (2020 03:20) (96% - 99%)  I&O's Detail    10 Nov 2020 07:01  -  2020 03:56  --------------------------------------------------------  IN:    IV PiggyBack: 100 mL    Lactated Ringers: 120 mL  Total IN: 220 mL    OUT:    Ileostomy (mL): 0 mL    Indwelling Catheter - Urethral (mL): 130 mL  Total OUT: 130 mL    Total NET: 90 mL        carvedilol 6.25  enoxaparin Injectable 40    PAST MEDICAL & SURGICAL HISTORY:  HTN (hypertension)    History of thalassemia minor    Breast CA  with radiation    MVP (mitral valve prolapse)  diagnosed 30 years ago    Diverticulitis    H/O lumpectomy  2006 right side    Previous  section  X2    History of unilateral salpingectomy  left    S/P endometrial ablation          Physical Exam:  General: NAD, resting comfortably in bed  Pulmonary: Nonlabored breathing, no respiratory distress, CTAB  Cardiovascular: NSR, no murmurs or rubs  Abdominal: soft, appropriately tender, nondistended. Incisions CDI.  Extremities: WWP      LABS:                        9.2    16.37 )-----------( 416      ( 10 Nov 2020 22:04 )             29.1     11-10    134<L>  |  98  |  6<L>  ----------------------------<  158<H>  3.7   |  22  |  0.47<L>    Ca    7.9<L>      10 Nov 2020 22:04  Phos  3.4     11-10  Mg     1.9     11-10        CAPILLARY BLOOD GLUCOSE      POCT Blood Glucose.: 180 mg/dL (10 Nov 2020 11:17)      Radiology and Additional Studies:    Assessment:  The patient is a 70y Female who is now several hours post-op from a     Plan:  - Pain control as needed, continue ***  - tolerating CLD  - DVT ppx  - OOB and ambulating as tolerated  - F/u AM labs    Jose, MIRANDAY1 POST-OPERATIVE NOTE    Patient is s/p low anterior resection, diverting loop ileostomy, right salpingoophorectomy, d&c    Subjective:  Patient reports pain at the incisional site, controlled with medication  Denies chest pain, shortness of breath, nausea, vomiting  Is not yet passing gas, some succus coming through ostomy  Not yet urinating independently or ambulating independently    Vital Signs Last 24 Hrs  T(C): 36.4 (2020 03:20), Max: 36.7 (10 Nov 2020 21:17)  T(F): 97.6 (2020 03:20), Max: 98.1 (10 Nov 2020 21:17)  HR: 65 (2020 03:20) (65 - 93)  BP: 103/65 (2020 03:20) (90/51 - 159/86)  BP(mean): 65 (10 Nov 2020 23:30) (65 - 80)  RR: 18 (2020 03:20) (16 - 18)  SpO2: 97% (2020 03:20) (96% - 99%)  I&O's Detail    10 Nov 2020 07:01  -  2020 03:56  --------------------------------------------------------  IN:    IV PiggyBack: 100 mL    Lactated Ringers: 120 mL  Total IN: 220 mL    OUT:    Ileostomy (mL): 0 mL    Indwelling Catheter - Urethral (mL): 130 mL  Total OUT: 130 mL    Total NET: 90 mL        carvedilol 6.25  enoxaparin Injectable 40    PAST MEDICAL & SURGICAL HISTORY:  HTN (hypertension)    History of thalassemia minor    Breast CA  with radiation    MVP (mitral valve prolapse)  diagnosed 30 years ago    Diverticulitis    H/O lumpectomy  2006 right side    Previous  section  X2    History of unilateral salpingectomy  left    S/P endometrial ablation          Physical Exam:  General: NAD, resting comfortably in bed  Pulmonary: Nonlabored breathing, no respiratory distress, CTAB  Cardiovascular: NSR, no murmurs or rubs  Abdominal: soft, appropriately tender, nondistended. Incisions CDI, stoma pink and protuberant  Extremities: WWP      LABS:                        9.2    16.37 )-----------( 416      ( 10 Nov 2020 22:04 )             29.1     11-10    134<L>  |  98  |  6<L>  ----------------------------<  158<H>  3.7   |  22  |  0.47<L>    Ca    7.9<L>      10 Nov 2020 22:04  Phos  3.4     11-10  Mg     1.9     11-10        CAPILLARY BLOOD GLUCOSE      POCT Blood Glucose.: 180 mg/dL (10 Nov 2020 11:17)      Radiology and Additional Studies:    Assessment:  The patient is a 70y Female who is now several hours post-op from an LAR with diverting loop ileostomy, r salpingoophorectomy, d+c, recovering well    Plan:  - ERP  - Pain control as needed, continue multimodal pain control  - tolerating sips  - Maintain aguiar  - DVT ppx  - OOB and ambulating as tolerated  - F/u AM labs    Jose, MIRANDAY1

## 2020-11-11 NOTE — DISCHARGE NOTE PROVIDER - NSDCCPTREATMENT_GEN_ALL_CORE_FT
PRINCIPAL PROCEDURE  Procedure: Open low anterior resection of colon  Findings and Treatment: · Operative Findings	Open anterior resection of sigmoid colon for diverticulitis and intramural abscess. Chronic inflammation to the bilateral adnexa and terminal ileum. R salpingoopherectomy and D&C performed by GYN. Stapled 31mm EEA anastamosis created with negative leak test by rigid sigmoidoscopy. Anastomosis at 12cm. Diverting loop ileostomy created.        SECONDARY PROCEDURE  Procedure: D&C (dilatation and curettage, scraping of uterus)  Findings and Treatment: · Operative Findings	At Ex Lap, pelvic collection had been evacuated and min residual rind of tissue on inferior right postero-lateral cervical area. Right adnexa with dense scar to right PSW - removed. Left adnexa absent, but ~1.5 cm peritoneal tissue removed from left lateral aspect of post leaflet of broad lig under the round lig (well above the left ureter). Op site hemostatic. Given the findings, I felt hysterectomy was not indicated. D&C performed to exclude intrauterine pathology. Scant tissue obtained. FS- n.       PRINCIPAL PROCEDURE  Procedure: Open low anterior resection of colon  Findings and Treatment: Open anterior resection of sigmoid colon for diverticulitis and intramural abscess. Chronic inflammation to the bilateral adnexa and terminal ileum. R salpingoopherectomy and D&C performed by GYN. Stapled 31mm EEA anastamosis created with negative leak test by rigid sigmoidoscopy. Anastomosis at 12cm. Diverting loop ileostomy created.      SECONDARY PROCEDURE  Procedure: D&C (dilatation and curettage, scraping of uterus)  Findings and Treatment: At Ex Lap, pelvic collection had been evacuated and min residual rind of tissue on inferior right postero-lateral cervical area. Right adnexa with dense scar to right PSW - removed. Left adnexa absent, but ~1.5 cm peritoneal tissue removed from left lateral aspect of post leaflet of broad lig under the round lig (well above the left ureter). Op site hemostatic. Given the findings, I felt hysterectomy was not indicated. D&C performed to exclude intrauterine pathology. Scant tissue obtained. FS- bgn.

## 2020-11-11 NOTE — DISCHARGE NOTE PROVIDER - NSDCFUADDINST_GEN_ALL_CORE_FT
Please measure and record your ostomy output. If the output drops below 400 in 24 hrs please stop taking the imodium.

## 2020-11-11 NOTE — PROGRESS NOTE ADULT - ASSESSMENT
A/P: 70ys/p low anterior resection, diverting loop ileostomy, right salpingoophorectomy, d&c.  Patient is stable and doing well.      - plan per primary team, appreciate excellent care   - gyn will continue to follow     Anupama Pantoja PGY-4

## 2020-11-11 NOTE — PROGRESS NOTE ADULT - SUBJECTIVE AND OBJECTIVE BOX
Day 1 of Anesthesia Pain Management Service    SUBJECTIVE: Just sore  Pain Scale Score:          [X] Refer to charted pain scores    THERAPY:    s/p    200 mcg PF morphine on 11\10\2020      MEDICATIONS  (STANDING):  acetaminophen  IVPB .. 1000 milliGRAM(s) IV Intermittent every 6 hours  carvedilol 6.25 milliGRAM(s) Oral every 12 hours  enoxaparin Injectable 40 milliGRAM(s) SubCutaneous daily  ketorolac   Injectable 15 milliGRAM(s) IV Push every 6 hours  lactated ringers. 1000 milliLiter(s) (40 mL/Hr) IV Continuous <Continuous>  morphine PF Spinal 0.2 milliGRAM(s) IntraThecal. once    MEDICATIONS  (PRN):  naloxone Injectable 0.1 milliGRAM(s) IV Push every 3 minutes PRN For ANY of the following changes in patient status:  A. RR LESS THAN 10 breaths per minute, B. Oxygen saturation LESS THAN 90%, C. Sedation score of 6  ondansetron Injectable 4 milliGRAM(s) IV Push every 6 hours PRN Nausea  oxyCODONE    IR 5 milliGRAM(s) Oral every 3 hours PRN Mild Pain (1 - 3)  oxyCODONE    IR 10 milliGRAM(s) Oral every 3 hours PRN Moderate Pain (4 - 6)      OBJECTIVE:    Sedation:        	[X] Alert	 [ ] Drowsy	[ ] Arousable      [ ] Asleep       [ ] Unresponsive    Side Effects:	[X] None 	[ ] Nausea	[ ] Vomiting         [ ] Pruritus  		[ ] Weakness            [ ] Numbness	          [ ] Other:    Vital Signs Last 24 Hrs  T(C): 36.5 (11 Nov 2020 06:17), Max: 36.7 (10 Nov 2020 21:17)  T(F): 97.7 (11 Nov 2020 06:17), Max: 98.1 (10 Nov 2020 21:17)  HR: 70 (11 Nov 2020 06:17) (65 - 93)  BP: 106/65 (11 Nov 2020 06:17) (90/51 - 159/86)  BP(mean): 65 (10 Nov 2020 23:30) (65 - 80)  RR: 18 (11 Nov 2020 06:17) (16 - 18)  SpO2: 97% (11 Nov 2020 06:17) (96% - 99%)    ASSESSMENT/ PLAN  [X] Patient to be transitioned to prn analgesics later today  [X] Pain management per primary service, pain service to sign off   [X]Documentation and Verification of current medications

## 2020-11-12 LAB
ANION GAP SERPL CALC-SCNC: 8 MMOL/L — SIGNIFICANT CHANGE UP (ref 5–17)
BLD GP AB SCN SERPL QL: NEGATIVE — SIGNIFICANT CHANGE UP
BUN SERPL-MCNC: 13 MG/DL — SIGNIFICANT CHANGE UP (ref 7–23)
CALCIUM SERPL-MCNC: 8.3 MG/DL — LOW (ref 8.4–10.5)
CHLORIDE SERPL-SCNC: 97 MMOL/L — SIGNIFICANT CHANGE UP (ref 96–108)
CO2 SERPL-SCNC: 25 MMOL/L — SIGNIFICANT CHANGE UP (ref 22–31)
CREAT SERPL-MCNC: 0.54 MG/DL — SIGNIFICANT CHANGE UP (ref 0.5–1.3)
GLUCOSE SERPL-MCNC: 108 MG/DL — HIGH (ref 70–99)
HCT VFR BLD CALC: 23.5 % — LOW (ref 34.5–45)
HCT VFR BLD CALC: 25.5 % — LOW (ref 34.5–45)
HGB BLD-MCNC: 7.4 G/DL — LOW (ref 11.5–15.5)
HGB BLD-MCNC: 8.1 G/DL — LOW (ref 11.5–15.5)
MAGNESIUM SERPL-MCNC: 1.9 MG/DL — SIGNIFICANT CHANGE UP (ref 1.6–2.6)
MCHC RBC-ENTMCNC: 18.6 PG — LOW (ref 27–34)
MCHC RBC-ENTMCNC: 18.6 PG — LOW (ref 27–34)
MCHC RBC-ENTMCNC: 31.5 GM/DL — LOW (ref 32–36)
MCHC RBC-ENTMCNC: 31.8 GM/DL — LOW (ref 32–36)
MCV RBC AUTO: 58.5 FL — LOW (ref 80–100)
MCV RBC AUTO: 59 FL — LOW (ref 80–100)
NRBC # BLD: 0 /100 WBCS — SIGNIFICANT CHANGE UP (ref 0–0)
NRBC # BLD: 0 /100 WBCS — SIGNIFICANT CHANGE UP (ref 0–0)
PHOSPHATE SERPL-MCNC: 2 MG/DL — LOW (ref 2.5–4.5)
PLATELET # BLD AUTO: 377 K/UL — SIGNIFICANT CHANGE UP (ref 150–400)
PLATELET # BLD AUTO: 465 K/UL — HIGH (ref 150–400)
POTASSIUM SERPL-MCNC: 4 MMOL/L — SIGNIFICANT CHANGE UP (ref 3.5–5.3)
POTASSIUM SERPL-SCNC: 4 MMOL/L — SIGNIFICANT CHANGE UP (ref 3.5–5.3)
RBC # BLD: 3.98 M/UL — SIGNIFICANT CHANGE UP (ref 3.8–5.2)
RBC # BLD: 4.36 M/UL — SIGNIFICANT CHANGE UP (ref 3.8–5.2)
RBC # FLD: 18.6 % — HIGH (ref 10.3–14.5)
RBC # FLD: 19 % — HIGH (ref 10.3–14.5)
RH IG SCN BLD-IMP: POSITIVE — SIGNIFICANT CHANGE UP
SODIUM SERPL-SCNC: 130 MMOL/L — LOW (ref 135–145)
WBC # BLD: 12.03 K/UL — HIGH (ref 3.8–10.5)
WBC # BLD: 12.96 K/UL — HIGH (ref 3.8–10.5)
WBC # FLD AUTO: 12.03 K/UL — HIGH (ref 3.8–10.5)
WBC # FLD AUTO: 12.96 K/UL — HIGH (ref 3.8–10.5)

## 2020-11-12 PROCEDURE — 74018 RADEX ABDOMEN 1 VIEW: CPT | Mod: 26

## 2020-11-12 RX ORDER — IBUPROFEN 200 MG
400 TABLET ORAL EVERY 6 HOURS
Refills: 0 | Status: DISCONTINUED | OUTPATIENT
Start: 2020-11-12 | End: 2020-11-13

## 2020-11-12 RX ORDER — PANTOPRAZOLE SODIUM 20 MG/1
20 TABLET, DELAYED RELEASE ORAL ONCE
Refills: 0 | Status: COMPLETED | OUTPATIENT
Start: 2020-11-12 | End: 2020-11-12

## 2020-11-12 RX ORDER — ONDANSETRON 8 MG/1
4 TABLET, FILM COATED ORAL ONCE
Refills: 0 | Status: COMPLETED | OUTPATIENT
Start: 2020-11-12 | End: 2020-11-12

## 2020-11-12 RX ORDER — ONDANSETRON 8 MG/1
4 TABLET, FILM COATED ORAL EVERY 8 HOURS
Refills: 0 | Status: DISCONTINUED | OUTPATIENT
Start: 2020-11-12 | End: 2020-11-13

## 2020-11-12 RX ORDER — LOSARTAN POTASSIUM 100 MG/1
100 TABLET, FILM COATED ORAL DAILY
Refills: 0 | Status: DISCONTINUED | OUTPATIENT
Start: 2020-11-12 | End: 2020-11-13

## 2020-11-12 RX ORDER — ACETAMINOPHEN 500 MG
975 TABLET ORAL EVERY 6 HOURS
Refills: 0 | Status: DISCONTINUED | OUTPATIENT
Start: 2020-11-12 | End: 2020-11-13

## 2020-11-12 RX ORDER — SODIUM CHLORIDE 9 MG/ML
1000 INJECTION, SOLUTION INTRAVENOUS
Refills: 0 | Status: DISCONTINUED | OUTPATIENT
Start: 2020-11-12 | End: 2020-11-13

## 2020-11-12 RX ORDER — PANTOPRAZOLE SODIUM 20 MG/1
40 TABLET, DELAYED RELEASE ORAL ONCE
Refills: 0 | Status: COMPLETED | OUTPATIENT
Start: 2020-11-12 | End: 2020-11-12

## 2020-11-12 RX ORDER — SODIUM CHLORIDE 9 MG/ML
1000 INJECTION, SOLUTION INTRAVENOUS
Refills: 0 | Status: DISCONTINUED | OUTPATIENT
Start: 2020-11-12 | End: 2020-11-12

## 2020-11-12 RX ADMIN — SODIUM CHLORIDE 40 MILLILITER(S): 9 INJECTION, SOLUTION INTRAVENOUS at 01:32

## 2020-11-12 RX ADMIN — Medication 975 MILLIGRAM(S): at 21:26

## 2020-11-12 RX ADMIN — Medication 15 MILLIGRAM(S): at 01:03

## 2020-11-12 RX ADMIN — CARVEDILOL PHOSPHATE 6.25 MILLIGRAM(S): 80 CAPSULE, EXTENDED RELEASE ORAL at 17:52

## 2020-11-12 RX ADMIN — ONDANSETRON 4 MILLIGRAM(S): 8 TABLET, FILM COATED ORAL at 19:19

## 2020-11-12 RX ADMIN — SODIUM CHLORIDE 40 MILLILITER(S): 9 INJECTION, SOLUTION INTRAVENOUS at 05:50

## 2020-11-12 RX ADMIN — Medication 400 MILLIGRAM(S): at 11:17

## 2020-11-12 RX ADMIN — PANTOPRAZOLE SODIUM 20 MILLIGRAM(S): 20 TABLET, DELAYED RELEASE ORAL at 18:19

## 2020-11-12 RX ADMIN — Medication 400 MILLIGRAM(S): at 17:56

## 2020-11-12 RX ADMIN — ENOXAPARIN SODIUM 40 MILLIGRAM(S): 100 INJECTION SUBCUTANEOUS at 11:17

## 2020-11-12 RX ADMIN — Medication 975 MILLIGRAM(S): at 14:50

## 2020-11-12 RX ADMIN — Medication 400 MILLIGRAM(S): at 23:21

## 2020-11-12 RX ADMIN — Medication 975 MILLIGRAM(S): at 14:20

## 2020-11-12 RX ADMIN — Medication 975 MILLIGRAM(S): at 08:33

## 2020-11-12 RX ADMIN — PANTOPRAZOLE SODIUM 40 MILLIGRAM(S): 20 TABLET, DELAYED RELEASE ORAL at 23:29

## 2020-11-12 RX ADMIN — Medication 975 MILLIGRAM(S): at 08:03

## 2020-11-12 RX ADMIN — Medication 975 MILLIGRAM(S): at 21:56

## 2020-11-12 RX ADMIN — OXYCODONE HYDROCHLORIDE 5 MILLIGRAM(S): 5 TABLET ORAL at 23:59

## 2020-11-12 RX ADMIN — SODIUM CHLORIDE 1000 MILLILITER(S): 9 INJECTION INTRAMUSCULAR; INTRAVENOUS; SUBCUTANEOUS at 00:30

## 2020-11-12 RX ADMIN — LOSARTAN POTASSIUM 100 MILLIGRAM(S): 100 TABLET, FILM COATED ORAL at 18:19

## 2020-11-12 RX ADMIN — Medication 85 MILLIMOLE(S): at 11:17

## 2020-11-12 RX ADMIN — ONDANSETRON 4 MILLIGRAM(S): 8 TABLET, FILM COATED ORAL at 23:23

## 2020-11-12 RX ADMIN — OXYCODONE HYDROCHLORIDE 5 MILLIGRAM(S): 5 TABLET ORAL at 23:29

## 2020-11-12 RX ADMIN — Medication 400 MILLIGRAM(S): at 23:51

## 2020-11-12 RX ADMIN — Medication 15 MILLIGRAM(S): at 00:33

## 2020-11-12 RX ADMIN — Medication 400 MILLIGRAM(S): at 18:26

## 2020-11-12 RX ADMIN — CARVEDILOL PHOSPHATE 6.25 MILLIGRAM(S): 80 CAPSULE, EXTENDED RELEASE ORAL at 05:27

## 2020-11-12 RX ADMIN — Medication 400 MILLIGRAM(S): at 11:47

## 2020-11-12 NOTE — PROGRESS NOTE ADULT - ASSESSMENT
70y female  POD#2 s/p LAR with diverting loop ileostomy, R salpingoophorectomy, D&C, stable and doing well postoperatively.   - No acute GYN concerns, patient recovering appropriately   - Tolerating clears, diet per primary team   - Venodynes, Lovenox for DVT ppx   - OOB, ambulation   - Appreciate care by primary team    70y female  POD#2 s/p LAR with diverting loop ileostomy, R salpingoophorectomy, D&C, stable and doing well postoperatively.   - No GYN concerns, patient recovering appropriately postop   - Tolerating clears, diet per primary team   - Venodynes, Lovenox for DVT ppx   - OOB, ambulation   - Appreciate care by primary team

## 2020-11-12 NOTE — PROGRESS NOTE ADULT - SUBJECTIVE AND OBJECTIVE BOX
Gyn ONC Progress Note POD#2  HD#3    Pt seen and examined at bedside. No acute events overnight. Patient reports some discomfort near ostomy site, but pain overall well controlled. Patient OOB, ambulating yesterday. Tolerating clears. Denies nausea, vomiting, fever, chills, chest pain, SOB, lightheadedness, dizziness. [place] in situ.     Objective:  T(F): 98.5 (11-12-20 @ 05:23), Max: 98.5 (11-12-20 @ 05:23)  HR: 65 (11-12-20 @ 05:23) (65 - 74)  BP: 123/74 (11-12-20 @ 05:23) (102/68 - 123/74)  RR: 18 (11-12-20 @ 05:23) (18 - 18)  SpO2: 97% (11-12-20 @ 05:23) (95% - 100%)  Wt(kg): --  I&O's Summary    11 Nov 2020 07:01  -  12 Nov 2020 07:00  --------------------------------------------------------  IN: 2660 mL / OUT: 1615 mL / NET: 1045 mL      MEDICATIONS  (STANDING):  carvedilol 6.25 milliGRAM(s) Oral every 12 hours  dextrose 5% + sodium chloride 0.9%. 1000 milliLiter(s) (40 mL/Hr) IV Continuous <Continuous>  enoxaparin Injectable 40 milliGRAM(s) SubCutaneous daily    MEDICATIONS  (PRN):  naloxone Injectable 0.1 milliGRAM(s) IV Push every 3 minutes PRN For ANY of the following changes in patient status:  A. RR LESS THAN 10 breaths per minute, B. Oxygen saturation LESS THAN 90%, C. Sedation score of 6  ondansetron Injectable 4 milliGRAM(s) IV Push every 6 hours PRN Nausea  oxyCODONE    IR 5 milliGRAM(s) Oral every 3 hours PRN Mild Pain (1 - 3)  oxyCODONE    IR 10 milliGRAM(s) Oral every 3 hours PRN Moderate Pain (4 - 6)      Physical Exam:  Constitutional: NAD, AOx3  CV: RRR S1 S2   Lungs: CTAB   Abdomen: soft, non-distended, non-tender. No guarding, no rebound, incision c/d/i, pink viable appearing stoma   : [place] in situ   Extremities: no lower extremity edema or calf tenderness bilaterally; venodynes in place          LABS:  11-11    129<L>  |  93<L>  |  15     ----------------------------<  146<H>  4.2     |  27     |  0.77   11-11    133<L>  |  97     |  8      ----------------------------<  157<H>  4.2     |  26     |  0.53   11-10    134<L>  |  98     |  6<L>   ----------------------------<  158<H>  3.7     |  22     |  0.47<L>    Ca    8.6        11 Nov 2020 22:47  Ca    8.3<L>      11 Nov 2020 06:46  Ca    7.9<L>      10 Nov 2020 22:04  Phos  4.3       11-11  Phos  3.4       11-10  Mg     2.1       11-11  Mg     1.9       11-10

## 2020-11-12 NOTE — PROGRESS NOTE ADULT - SUBJECTIVE AND OBJECTIVE BOX
SURGERY Progress Note     SURGEON:   SURGERY: LAR with diverting loop ileostomy, R salpingoophorectomy, D&C    24h events:  1L bolus given over night for low urine output    Subjective: Pt seen and examined at bedside, now POD2 . Patient reports some discomfort near ostomy site, but pain overall well controlled. Patient OOB, ambulating. Tolerating clears.  + GI function. Packing changed this AM.   Denies nausea, vomiting, fever, chills, chest pain, SOB, lightheadedness, dizziness.     Objective:  T(F): 98.5 (11-12-20 @ 05:23), Max: 98.5 (11-12-20 @ 05:23)  HR: 65 (11-12-20 @ 05:23) (65 - 74)  BP: 123/74 (11-12-20 @ 05:23) (102/68 - 123/74)  RR: 18 (11-12-20 @ 05:23) (18 - 18)  SpO2: 97% (11-12-20 @ 05:23) (95% - 100%)  Wt(kg): --  I&O's Summary    11 Nov 2020 07:01  -  12 Nov 2020 07:00  --------------------------------------------------------  IN: 2660 mL / OUT: 1615 mL / NET: 1045 mL      MEDICATIONS  (STANDING):  carvedilol 6.25 milliGRAM(s) Oral every 12 hours  dextrose 5% + sodium chloride 0.9%. 1000 milliLiter(s) (40 mL/Hr) IV Continuous <Continuous>  enoxaparin Injectable 40 milliGRAM(s) SubCutaneous daily    MEDICATIONS  (PRN):  naloxone Injectable 0.1 milliGRAM(s) IV Push every 3 minutes PRN For ANY of the following changes in patient status:  A. RR LESS THAN 10 breaths per minute, B. Oxygen saturation LESS THAN 90%, C. Sedation score of 6  ondansetron Injectable 4 milliGRAM(s) IV Push every 6 hours PRN Nausea  oxyCODONE    IR 5 milliGRAM(s) Oral every 3 hours PRN Mild Pain (1 - 3)  oxyCODONE    IR 10 milliGRAM(s) Oral every 3 hours PRN Moderate Pain (4 - 6)      Physical Exam:  Constitutional: NAD, AOx3  Lungs: No labored breathing noted  Abdomen: soft, non-distended, non-tender. No guarding, no rebound, incision c/d/i, pink viable appearing stoma   : [place] in situ   Extremities: no lower extremity edema or calf tenderness bilaterally; venodynes in place      LABS:  11-11    129<L>  |  93<L>  |  15     ----------------------------<  146<H>  4.2     |  27     |  0.77   11-11    133<L>  |  97     |  8      ----------------------------<  157<H>  4.2     |  26     |  0.53   11-10    134<L>  |  98     |  6<L>   ----------------------------<  158<H>  3.7     |  22     |  0.47<L>    Ca    8.6        11 Nov 2020 22:47  Ca    8.3<L>      11 Nov 2020 06:46  Ca    7.9<L>      10 Nov 2020 22:04  Phos  4.3       11-11  Phos  3.4       11-10  Mg     2.1       11-11  Mg     1.9       11-10

## 2020-11-13 LAB
ANION GAP SERPL CALC-SCNC: 8 MMOL/L — SIGNIFICANT CHANGE UP (ref 5–17)
BUN SERPL-MCNC: 9 MG/DL — SIGNIFICANT CHANGE UP (ref 7–23)
CALCIUM SERPL-MCNC: 8.2 MG/DL — LOW (ref 8.4–10.5)
CHLORIDE SERPL-SCNC: 95 MMOL/L — LOW (ref 96–108)
CO2 SERPL-SCNC: 25 MMOL/L — SIGNIFICANT CHANGE UP (ref 22–31)
CREAT SERPL-MCNC: 0.5 MG/DL — SIGNIFICANT CHANGE UP (ref 0.5–1.3)
GLUCOSE SERPL-MCNC: 98 MG/DL — SIGNIFICANT CHANGE UP (ref 70–99)
HCT VFR BLD CALC: 27.2 % — LOW (ref 34.5–45)
HGB BLD-MCNC: 8.5 G/DL — LOW (ref 11.5–15.5)
MAGNESIUM SERPL-MCNC: 1.7 MG/DL — SIGNIFICANT CHANGE UP (ref 1.6–2.6)
MCHC RBC-ENTMCNC: 18.4 PG — LOW (ref 27–34)
MCHC RBC-ENTMCNC: 31.3 GM/DL — LOW (ref 32–36)
MCV RBC AUTO: 58.9 FL — LOW (ref 80–100)
NRBC # BLD: 0 /100 WBCS — SIGNIFICANT CHANGE UP (ref 0–0)
PHOSPHATE SERPL-MCNC: 2.5 MG/DL — SIGNIFICANT CHANGE UP (ref 2.5–4.5)
PLATELET # BLD AUTO: 439 K/UL — HIGH (ref 150–400)
POTASSIUM SERPL-MCNC: 3.9 MMOL/L — SIGNIFICANT CHANGE UP (ref 3.5–5.3)
POTASSIUM SERPL-SCNC: 3.9 MMOL/L — SIGNIFICANT CHANGE UP (ref 3.5–5.3)
RBC # BLD: 4.62 M/UL — SIGNIFICANT CHANGE UP (ref 3.8–5.2)
RBC # FLD: 18.8 % — HIGH (ref 10.3–14.5)
SODIUM SERPL-SCNC: 128 MMOL/L — LOW (ref 135–145)
WBC # BLD: 9.9 K/UL — SIGNIFICANT CHANGE UP (ref 3.8–10.5)
WBC # FLD AUTO: 9.9 K/UL — SIGNIFICANT CHANGE UP (ref 3.8–10.5)

## 2020-11-13 RX ORDER — SODIUM CHLORIDE 9 MG/ML
1000 INJECTION, SOLUTION INTRAVENOUS
Refills: 0 | Status: DISCONTINUED | OUTPATIENT
Start: 2020-11-13 | End: 2020-11-15

## 2020-11-13 RX ORDER — ACETAMINOPHEN 500 MG
975 TABLET ORAL EVERY 6 HOURS
Refills: 0 | Status: COMPLETED | OUTPATIENT
Start: 2020-11-13 | End: 2020-11-16

## 2020-11-13 RX ORDER — HYDROMORPHONE HYDROCHLORIDE 2 MG/ML
0.5 INJECTION INTRAMUSCULAR; INTRAVENOUS; SUBCUTANEOUS EVERY 4 HOURS
Refills: 0 | Status: DISCONTINUED | OUTPATIENT
Start: 2020-11-13 | End: 2020-11-16

## 2020-11-13 RX ORDER — SODIUM CHLORIDE 9 MG/ML
1000 INJECTION, SOLUTION INTRAVENOUS
Refills: 0 | Status: DISCONTINUED | OUTPATIENT
Start: 2020-11-13 | End: 2020-11-13

## 2020-11-13 RX ORDER — KETOROLAC TROMETHAMINE 30 MG/ML
15 SYRINGE (ML) INJECTION EVERY 8 HOURS
Refills: 0 | Status: DISCONTINUED | OUTPATIENT
Start: 2020-11-13 | End: 2020-11-14

## 2020-11-13 RX ORDER — LOSARTAN POTASSIUM 100 MG/1
100 TABLET, FILM COATED ORAL DAILY
Refills: 0 | Status: DISCONTINUED | OUTPATIENT
Start: 2020-11-13 | End: 2020-11-23

## 2020-11-13 RX ORDER — DEXTROSE MONOHYDRATE, SODIUM CHLORIDE, AND POTASSIUM CHLORIDE 50; .745; 4.5 G/1000ML; G/1000ML; G/1000ML
1000 INJECTION, SOLUTION INTRAVENOUS
Refills: 0 | Status: DISCONTINUED | OUTPATIENT
Start: 2020-11-13 | End: 2020-11-13

## 2020-11-13 RX ORDER — HYDRALAZINE HCL 50 MG
10 TABLET ORAL ONCE
Refills: 0 | Status: COMPLETED | OUTPATIENT
Start: 2020-11-13 | End: 2020-11-13

## 2020-11-13 RX ORDER — HYDROMORPHONE HYDROCHLORIDE 2 MG/ML
0.5 INJECTION INTRAMUSCULAR; INTRAVENOUS; SUBCUTANEOUS EVERY 6 HOURS
Refills: 0 | Status: DISCONTINUED | OUTPATIENT
Start: 2020-11-13 | End: 2020-11-13

## 2020-11-13 RX ORDER — MAGNESIUM SULFATE 500 MG/ML
2 VIAL (ML) INJECTION ONCE
Refills: 0 | Status: COMPLETED | OUTPATIENT
Start: 2020-11-13 | End: 2020-11-13

## 2020-11-13 RX ORDER — POTASSIUM PHOSPHATE, MONOBASIC POTASSIUM PHOSPHATE, DIBASIC 236; 224 MG/ML; MG/ML
30 INJECTION, SOLUTION INTRAVENOUS ONCE
Refills: 0 | Status: COMPLETED | OUTPATIENT
Start: 2020-11-13 | End: 2020-11-13

## 2020-11-13 RX ADMIN — Medication 50 GRAM(S): at 10:06

## 2020-11-13 RX ADMIN — Medication 400 MILLIGRAM(S): at 05:42

## 2020-11-13 RX ADMIN — POTASSIUM PHOSPHATE, MONOBASIC POTASSIUM PHOSPHATE, DIBASIC 83.33 MILLIMOLE(S): 236; 224 INJECTION, SOLUTION INTRAVENOUS at 11:44

## 2020-11-13 RX ADMIN — ENOXAPARIN SODIUM 40 MILLIGRAM(S): 100 INJECTION SUBCUTANEOUS at 11:44

## 2020-11-13 RX ADMIN — SODIUM CHLORIDE 3 MILLILITER(S): 9 INJECTION INTRAMUSCULAR; INTRAVENOUS; SUBCUTANEOUS at 18:44

## 2020-11-13 RX ADMIN — Medication 15 MILLIGRAM(S): at 17:02

## 2020-11-13 RX ADMIN — CARVEDILOL PHOSPHATE 6.25 MILLIGRAM(S): 80 CAPSULE, EXTENDED RELEASE ORAL at 17:46

## 2020-11-13 RX ADMIN — Medication 975 MILLIGRAM(S): at 07:56

## 2020-11-13 RX ADMIN — Medication 400 MILLIGRAM(S): at 05:12

## 2020-11-13 RX ADMIN — Medication 975 MILLIGRAM(S): at 18:16

## 2020-11-13 RX ADMIN — Medication 975 MILLIGRAM(S): at 01:19

## 2020-11-13 RX ADMIN — Medication 975 MILLIGRAM(S): at 07:26

## 2020-11-13 RX ADMIN — Medication 975 MILLIGRAM(S): at 01:49

## 2020-11-13 RX ADMIN — Medication 975 MILLIGRAM(S): at 11:45

## 2020-11-13 RX ADMIN — CARVEDILOL PHOSPHATE 6.25 MILLIGRAM(S): 80 CAPSULE, EXTENDED RELEASE ORAL at 05:12

## 2020-11-13 RX ADMIN — LOSARTAN POTASSIUM 100 MILLIGRAM(S): 100 TABLET, FILM COATED ORAL at 05:12

## 2020-11-13 RX ADMIN — Medication 400 MILLIGRAM(S): at 10:05

## 2020-11-13 RX ADMIN — SODIUM CHLORIDE 75 MILLILITER(S): 9 INJECTION, SOLUTION INTRAVENOUS at 06:10

## 2020-11-13 RX ADMIN — Medication 400 MILLIGRAM(S): at 10:35

## 2020-11-13 RX ADMIN — Medication 10 MILLIGRAM(S): at 01:24

## 2020-11-13 RX ADMIN — Medication 975 MILLIGRAM(S): at 17:46

## 2020-11-13 RX ADMIN — DEXTROSE MONOHYDRATE, SODIUM CHLORIDE, AND POTASSIUM CHLORIDE 75 MILLILITER(S): 50; .745; 4.5 INJECTION, SOLUTION INTRAVENOUS at 10:06

## 2020-11-13 RX ADMIN — Medication 975 MILLIGRAM(S): at 12:15

## 2020-11-13 RX ADMIN — Medication 15 MILLIGRAM(S): at 16:32

## 2020-11-13 NOTE — PROGRESS NOTE ADULT - ASSESSMENT
70y female POD#2 s/p LAR with diverting loop ileostomy, R salpingoophorectomy, D&C, post op course c/b ileus.      - No GYN concerns, as per GYN  - Diet will remain on sips today  - Will d/c PO pain meds and give IV pain meds as needed along with PO tylenol  - Venodynes, Lovenox for DVT ppx   - OOB, ambulation   - Will f/u AM labs  - Will f/u PT recs for outpatient recs     [id number]

## 2020-11-13 NOTE — PROGRESS NOTE ADULT - SUBJECTIVE AND OBJECTIVE BOX
GENERAL SURGERY PROGRESS NOTE    SUBJECTIVE  Patient seen and examined. Had episode of emesis x1 200cc.  Has not had one since. Reports tolerating sips without nausea or vomiting, GIF has slowed, AXR yesterday consistent with ileus, states she feels better than yesterday, voiding without issues, have been ambulating and out of bed. Denies fever, chills, SOB, chest pain.         OBJECTIVE    PHYSICAL EXAM  General: Appears well, NAD  CHEST: breathing comfortably  CV: appears well perfused  Abdomen: soft, nontender, nondistended, no rebound or guarding, ostomy pink and viable with minimal output in bag, midline wound packed and covered with gauze.  Extremities: Grossly symmetric    T(C): 36.4 (11-13-20 @ 05:05), Max: 37 (11-13-20 @ 00:52)  HR: 77 (11-13-20 @ 05:05) (60 - 78)  BP: 172/84 (11-13-20 @ 05:05) (128/62 - 173/83)  RR: 18 (11-13-20 @ 05:05) (17 - 18)  SpO2: 95% (11-13-20 @ 05:05) (95% - 98%)    11-12-20 @ 07:01  -  11-13-20 @ 07:00  --------------------------------------------------------  IN: 1650 mL / OUT: 1700 mL / NET: -50 mL        MEDICATIONS  acetaminophen   Tablet .. 975 milliGRAM(s) Oral every 6 hours  carvedilol 6.25 milliGRAM(s) Oral every 12 hours  enoxaparin Injectable 40 milliGRAM(s) SubCutaneous daily  HYDROmorphone  Injectable 0.5 milliGRAM(s) IV Push every 4 hours PRN  ibuprofen  Tablet. 400 milliGRAM(s) Oral every 6 hours  lactated ringers. 1000 milliLiter(s) IV Continuous <Continuous>  losartan 100 milliGRAM(s) Oral daily  ondansetron Injectable 4 milliGRAM(s) IV Push every 8 hours PRN      LABS                        8.1    12.96 )-----------( 465      ( 12 Nov 2020 11:26 )             25.5     11-12    130<L>  |  97  |  13  ----------------------------<  108<H>  4.0   |  25  |  0.54    Ca    8.3<L>      12 Nov 2020 07:14  Phos  2.0     11-12  Mg     1.9     11-12            RADIOLOGY & ADDITIONAL STUDIES    ******PRELIMINARY REPORT******    ******PRELIMINARY REPORT******          EXAM:  XR ABDOMEN PORTABLE URGENT 1V                            PROCEDURE DATE:  11/12/2020      ******PRELIMINARY REPORT******    ******PRELIMINARY REPORT******              INTERPRETATION:  nonspecific bowel gas pattern wih dilated loops of small bowel in the left hemiabdomen. considering post op day 1 can consdier ileus. air filled transverse colon is also visualized. Discussed with Dr. Mclain            ******PRELIMINARY REPORT******    ******PRELIMINARY REPORT******          VERN CONWAY MD; Resident Radiology

## 2020-11-13 NOTE — PROGRESS NOTE ADULT - SUBJECTIVE AND OBJECTIVE BOX
Gyn Progress Note POD #3    Subjective:     Pt seen and examined at bedside. Reports that yesterday she was transitioned to regular diet but was not able to tolerate food without vomiting. Was found on AXR to have ileus. Has been able to tolerate some sips of water and PO medication overnight. Pain well controlled. Patient ambulating, not yet passing flatus. Pt denies fever, chills, chest pain, SOB, lightheadedness, dizziness.      Objective:  T(F): 97.6 (11-13-20 @ 05:05), Max: 98.6 (11-13-20 @ 00:52)  HR: 77 (11-13-20 @ 05:05) (60 - 78)  BP: 172/84 (11-13-20 @ 05:05) (128/62 - 173/83)  RR: 18 (11-13-20 @ 05:05) (17 - 18)  SpO2: 95% (11-13-20 @ 05:05) (95% - 98%)  Wt(kg): --  I&O's Summary    11 Nov 2020 07:01  -  12 Nov 2020 07:00  --------------------------------------------------------  IN: 2660 mL / OUT: 1615 mL / NET: 1045 mL    12 Nov 2020 07:01  -  13 Nov 2020 06:37  --------------------------------------------------------  IN: 1650 mL / OUT: 1500 mL / NET: 150 mL      CAPILLARY BLOOD GLUCOSE          MEDICATIONS  (STANDING):  acetaminophen   Tablet .. 975 milliGRAM(s) Oral every 6 hours  carvedilol 6.25 milliGRAM(s) Oral every 12 hours  enoxaparin Injectable 40 milliGRAM(s) SubCutaneous daily  ibuprofen  Tablet. 400 milliGRAM(s) Oral every 6 hours  lactated ringers. 1000 milliLiter(s) (75 mL/Hr) IV Continuous <Continuous>  losartan 100 milliGRAM(s) Oral daily    MEDICATIONS  (PRN):  ondansetron Injectable 4 milliGRAM(s) IV Push every 8 hours PRN Nausea and/or Vomiting  oxyCODONE    IR 5 milliGRAM(s) Oral every 3 hours PRN Mild Pain (1 - 3)  oxyCODONE    IR 10 milliGRAM(s) Oral every 3 hours PRN Moderate Pain (4 - 6)      Physical Exam:  Constitutional: NAD, A+O x3  CV: RRR  Lungs: clear to auscultation bilaterally  Abdomen: soft, hypoactive bowel sounds, appropriately tender, softly distended, no rebound or guarding. Ostomy viable with decreased output.   Incision: clean, dry, intact  Extremities: no lower extremity edema or calf tenderness bilaterally    Labs:                        8.1    12.96 )-----------( 465      ( 12 Nov 2020 11:26 )             25.5   baso x      eos x      imm gran x      lymph x      mono x      poly x                            7.4    12.03 )-----------( 377      ( 12 Nov 2020 07:16 )             23.5   baso x      eos x      imm gran x      lymph x      mono x      poly x                            8.2    14.43 )-----------( 466      ( 11 Nov 2020 22:47 )             26.3   baso x      eos x      imm gran x      lymph x      mono x      poly x                            8.6    10.48 )-----------( 425      ( 11 Nov 2020 06:46 )             27.9   baso x      eos x      imm gran x      lymph x      mono x      poly x                            9.2    16.37 )-----------( 416      ( 10 Nov 2020 22:04 )             29.1   baso x      eos x      imm gran x      lymph x      mono x      poly x        11-12    130<L>  |  97     |  13     ----------------------------<  108<H>  4.0     |  25     |  0.54   11-11    129<L>  |  93<L>  |  15     ----------------------------<  146<H>  4.2     |  27     |  0.77     Ca    8.3<L>      12 Nov 2020 07:14  Ca    8.6        11 Nov 2020 22:47  Phos  2.0       11-12  Mg     1.9       11-12

## 2020-11-13 NOTE — PROGRESS NOTE ADULT - ASSESSMENT
Assessment/Plan: 70y female POD#3, s/p LAR with diverting loop ileostomy, R salpingoophorectomy, D&C. Stable with new postoperative ileus.   - No GYN concerns at this time   - Lovenox for DVT ppx  - Encourage ambulation   - Appreciate care by primary surgery team    JANE Esteves, PGY1

## 2020-11-14 LAB
ANION GAP SERPL CALC-SCNC: 10 MMOL/L — SIGNIFICANT CHANGE UP (ref 5–17)
BUN SERPL-MCNC: 5 MG/DL — LOW (ref 7–23)
CALCIUM SERPL-MCNC: 8.7 MG/DL — SIGNIFICANT CHANGE UP (ref 8.4–10.5)
CHLORIDE SERPL-SCNC: 101 MMOL/L — SIGNIFICANT CHANGE UP (ref 96–108)
CO2 SERPL-SCNC: 23 MMOL/L — SIGNIFICANT CHANGE UP (ref 22–31)
CREAT SERPL-MCNC: 0.51 MG/DL — SIGNIFICANT CHANGE UP (ref 0.5–1.3)
GLUCOSE SERPL-MCNC: 76 MG/DL — SIGNIFICANT CHANGE UP (ref 70–99)
HCT VFR BLD CALC: 28.2 % — LOW (ref 34.5–45)
HGB BLD-MCNC: 8.6 G/DL — LOW (ref 11.5–15.5)
MAGNESIUM SERPL-MCNC: 2 MG/DL — SIGNIFICANT CHANGE UP (ref 1.6–2.6)
MCHC RBC-ENTMCNC: 18.3 PG — LOW (ref 27–34)
MCHC RBC-ENTMCNC: 30.5 GM/DL — LOW (ref 32–36)
MCV RBC AUTO: 60 FL — LOW (ref 80–100)
NRBC # BLD: 0 /100 WBCS — SIGNIFICANT CHANGE UP (ref 0–0)
PHOSPHATE SERPL-MCNC: 3 MG/DL — SIGNIFICANT CHANGE UP (ref 2.5–4.5)
PLATELET # BLD AUTO: 498 K/UL — HIGH (ref 150–400)
POTASSIUM SERPL-MCNC: 4.2 MMOL/L — SIGNIFICANT CHANGE UP (ref 3.5–5.3)
POTASSIUM SERPL-SCNC: 4.2 MMOL/L — SIGNIFICANT CHANGE UP (ref 3.5–5.3)
RBC # BLD: 4.7 M/UL — SIGNIFICANT CHANGE UP (ref 3.8–5.2)
RBC # FLD: 18.8 % — HIGH (ref 10.3–14.5)
SODIUM SERPL-SCNC: 134 MMOL/L — LOW (ref 135–145)
WBC # BLD: 7.29 K/UL — SIGNIFICANT CHANGE UP (ref 3.8–10.5)
WBC # FLD AUTO: 7.29 K/UL — SIGNIFICANT CHANGE UP (ref 3.8–10.5)

## 2020-11-14 RX ORDER — MAGNESIUM OXIDE 400 MG ORAL TABLET 241.3 MG
1000 TABLET ORAL
Refills: 0 | Status: DISCONTINUED | OUTPATIENT
Start: 2020-11-14 | End: 2020-11-16

## 2020-11-14 RX ADMIN — Medication 15 MILLIGRAM(S): at 09:37

## 2020-11-14 RX ADMIN — Medication 15 MILLIGRAM(S): at 00:12

## 2020-11-14 RX ADMIN — ENOXAPARIN SODIUM 40 MILLIGRAM(S): 100 INJECTION SUBCUTANEOUS at 12:45

## 2020-11-14 RX ADMIN — Medication 975 MILLIGRAM(S): at 12:45

## 2020-11-14 RX ADMIN — Medication 975 MILLIGRAM(S): at 00:43

## 2020-11-14 RX ADMIN — Medication 975 MILLIGRAM(S): at 13:35

## 2020-11-14 RX ADMIN — Medication 15 MILLIGRAM(S): at 10:02

## 2020-11-14 RX ADMIN — Medication 975 MILLIGRAM(S): at 17:17

## 2020-11-14 RX ADMIN — LOSARTAN POTASSIUM 100 MILLIGRAM(S): 100 TABLET, FILM COATED ORAL at 06:15

## 2020-11-14 RX ADMIN — Medication 15 MILLIGRAM(S): at 00:42

## 2020-11-14 RX ADMIN — Medication 975 MILLIGRAM(S): at 00:13

## 2020-11-14 RX ADMIN — Medication 975 MILLIGRAM(S): at 17:47

## 2020-11-14 RX ADMIN — MAGNESIUM OXIDE 400 MG ORAL TABLET 1000 MILLIGRAM(S): 241.3 TABLET ORAL at 17:37

## 2020-11-14 RX ADMIN — CARVEDILOL PHOSPHATE 6.25 MILLIGRAM(S): 80 CAPSULE, EXTENDED RELEASE ORAL at 17:17

## 2020-11-14 RX ADMIN — Medication 975 MILLIGRAM(S): at 23:07

## 2020-11-14 RX ADMIN — Medication 975 MILLIGRAM(S): at 23:37

## 2020-11-14 RX ADMIN — Medication 975 MILLIGRAM(S): at 06:15

## 2020-11-14 RX ADMIN — CARVEDILOL PHOSPHATE 6.25 MILLIGRAM(S): 80 CAPSULE, EXTENDED RELEASE ORAL at 06:15

## 2020-11-14 RX ADMIN — Medication 975 MILLIGRAM(S): at 06:45

## 2020-11-14 NOTE — PROGRESS NOTE ADULT - SUBJECTIVE AND OBJECTIVE BOX
Patient seen and examined at bedside, no acute overnight events. No acute complaints, pain well controlled. Patient is ambulating, voiding spontaneously.  Patient was made NPO due to nausea and vomiting on regular diet, and dx w/ ileus.  Overnight tolerated sips w/ medications.  No additional nausea or vomiting. Denies CP, SOB, N/V, fevers, and chills.    Vital Signs Last 24 Hours  T(C): 36.6 (11-14-20 @ 05:25), Max: 37.1 (11-13-20 @ 14:49)  HR: 72 (11-14-20 @ 05:25) (62 - 76)  BP: 160/79 (11-14-20 @ 05:25) (115/76 - 160/79)  RR: 18 (11-14-20 @ 05:25) (18 - 18)  SpO2: 97% (11-14-20 @ 05:25) (95% - 98%)    I&O's Detail    12 Nov 2020 07:01  -  13 Nov 2020 07:00  --------------------------------------------------------  IN:    Lactated Ringers: 1050 mL    Oral Fluid: 600 mL  Total IN: 1650 mL    OUT:    Emesis (mL): 200 mL    Ileostomy (mL): 50 mL    Indwelling Catheter - Urethral (mL): 300 mL    Voided (mL): 1150 mL  Total OUT: 1700 mL    Total NET: -50 mL      13 Nov 2020 07:01  -  14 Nov 2020 06:59  --------------------------------------------------------  IN:    dextrose 5% + sodium chloride 0.9% w/ Additives: 1800 mL    IV PiggyBack: 550 mL    Oral Fluid: 60 mL  Total IN: 2410 mL    OUT:    Ileostomy (mL): 0 mL    Voided (mL): 2300 mL  Total OUT: 2300 mL    Total NET: 110 mL    Physical Exam:  General: NAD  CV: NR, RR, S1, S2, no M/R/G  Lungs: CTA-B  Abdomen: Soft, appropriately tender to palpation, moderate distention +BS, ostomy bag w/ some output and evidence of air  Ext: No pain or swelling    Labs:             8.5<L>  9.90  )-----------( 439<H>    ( 11-13 @ 07:16 )             27.2<L>               8.1<L>  12.96<H> )-----------( 465<H>    ( 11-12 @ 11:26 )             25.5<L>               7.4<L>  12.03<H> )-----------( 377      ( 11-12 @ 07:16 )             23.5<L>               8.2<L>  14.43<H> )-----------( 466<H>    ( 11-11 @ 22:47 )             26.3<L>               8.6<L>  10.48 )-----------( 425<H>    ( 11-11 @ 06:46 )             27.9<L>        MEDICATIONS  (STANDING):  acetaminophen   Tablet .. 975 milliGRAM(s) Oral every 6 hours  carvedilol 6.25 milliGRAM(s) Oral every 12 hours  dextrose 5% + sodium chloride 0.9% 1000 milliLiter(s) (75 mL/Hr) IV Continuous <Continuous>  enoxaparin Injectable 40 milliGRAM(s) SubCutaneous daily  ketorolac   Injectable 15 milliGRAM(s) IV Push every 8 hours  losartan 100 milliGRAM(s) Oral daily    MEDICATIONS  (PRN):  HYDROmorphone  Injectable 0.5 milliGRAM(s) IV Push every 4 hours PRN Severe Pain (7 - 10)

## 2020-11-14 NOTE — PROGRESS NOTE ADULT - ASSESSMENT
70y female POD#3 s/p LAR with diverting loop ileostomy, R salpingoophorectomy, D&C, post op course c/b ileus.      - No GYN concerns, as per GYN  - Diet will remain on sips today  - Will d/c PO pain meds and give IV pain meds as needed along with PO tylenol  - Venodynes, Lovenox for DVT ppx   - await robf  - OOB, ambulation   - Will f/u AM labs  - PT: d/c home with no skilled needs    [id number] 70y female POD#4 s/p LAR with diverting loop ileostomy, R salpingoophorectomy, D&C, post op course c/b ileus.      - No GYN concerns, as per GYN  - Diet will remain on sips today  - Will d/c PO pain meds and give IV pain meds as needed along with PO tylenol  - Venodynes, Lovenox for DVT ppx   - await robf  - OOB, ambulation   - Will f/u AM labs  - PT: d/c home with no skilled needs    [phone]

## 2020-11-14 NOTE — PROGRESS NOTE ADULT - SUBJECTIVE AND OBJECTIVE BOX
GENERAL SURGERY PROGRESS NOTE    Patient: AJ ESPINOZA , 70y (52)Female   MRN: [id number]  Location: [facility] 2MON 202 W1  Visit: 11-10-20 Inpatient  Date: 20 @ 05:25      Procedure: ERAS open LAR, diverting loop ileostomy, R salpingooophorectomy, d&c    Events of past 24 hours:  - NAEO    Subjective: Patient seen and examined at bedside    PAST MEDICAL & SURGICAL HISTORY:  HTN (hypertension)    History of thalassemia minor    Breast CA  with radiation    MVP (mitral valve prolapse)  diagnosed 30 years ago    Diverticulitis    H/O lumpectomy  2006 right side    Previous  section  X2    History of unilateral salpingectomy  left    S/P endometrial ablation        Vitals: T(F): 97.9 (20 @ 01:13), Max: 98.7 (20 @ 14:49)  HR: 64 (20 @ 01:13)  BP: 123/68 (20 @ 01:13)  RR: 18 (20 @ 01:13)  SpO2: 97% (20 @ 01:13)      Diet, NPO:   Except Medications  With Ice Chips/Sips of Water      20 @ 07:01  -  20 @ 07:00  --------------------------------------------------------  IN:    Lactated Ringers: 1050 mL    Oral Fluid: 600 mL  Total IN: 1650 mL    OUT:    Emesis (mL): 200 mL    Ileostomy (mL): 50 mL    Indwelling Catheter - Urethral (mL): 300 mL    Voided (mL): 1150 mL  Total OUT: 1700 mL    Total NET: -50 mL          PHYSICAL EXAM  General: NAD, resting comfortably  Neurology: A&Ox3, moves all extremities  Respiratory: nonlabored breathing, normal chest wall expansion  Abdominal: Soft, NT, ND, incisions c/d/i  Vascular: Warm and well perfused  Extremities: No edema  Lines/Drains: [place] catheter in place    MEDICATIONS  (STANDING):  acetaminophen   Tablet .. 975 milliGRAM(s) Oral every 6 hours  carvedilol 6.25 milliGRAM(s) Oral every 12 hours  dextrose 5% + sodium chloride 0.9% 1000 milliLiter(s) (75 mL/Hr) IV Continuous <Continuous>  enoxaparin Injectable 40 milliGRAM(s) SubCutaneous daily  ketorolac   Injectable 15 milliGRAM(s) IV Push every 8 hours  losartan 100 milliGRAM(s) Oral daily    MEDICATIONS  (PRN):  HYDROmorphone  Injectable 0.5 milliGRAM(s) IV Push every 4 hours PRN Severe Pain (7 - 10)      LAB/STUDIES:  CAPILLARY BLOOD GLUCOSE                              8.5    9.90  )-----------( 439      ( 2020 07:16 )             27.2     11-13    128<L>  |  95<L>  |  9   ----------------------------<  98  3.9   |  25  |  0.50    Ca    8.2<L>      2020 07:16  Phos  2.5     11-13  Mg     1.7     13

## 2020-11-14 NOTE — PROGRESS NOTE ADULT - ASSESSMENT
Assessment/Plan: 70y female POD#3, s/p LAR with diverting loop ileostomy, R salpingoophorectomy, D&C. Stable with new postoperative ileus.  Patient transitioned to NPO w/ resolution of nausea and vomiting, modest output in ileostomy bag.    - Advancement of diet per primary team  - No GYN concerns at this time   - Lovenox for DVT ppx  - Encourage ambulation   - Appreciate [facility] surgery team    Zoe Orosco PGY2

## 2020-11-15 LAB
ANION GAP SERPL CALC-SCNC: 8 MMOL/L — SIGNIFICANT CHANGE UP (ref 5–17)
APPEARANCE UR: CLEAR — SIGNIFICANT CHANGE UP
BILIRUB UR-MCNC: NEGATIVE — SIGNIFICANT CHANGE UP
BUN SERPL-MCNC: 4 MG/DL — LOW (ref 7–23)
CALCIUM SERPL-MCNC: 9 MG/DL — SIGNIFICANT CHANGE UP (ref 8.4–10.5)
CHLORIDE SERPL-SCNC: 100 MMOL/L — SIGNIFICANT CHANGE UP (ref 96–108)
CO2 SERPL-SCNC: 27 MMOL/L — SIGNIFICANT CHANGE UP (ref 22–31)
COLOR SPEC: SIGNIFICANT CHANGE UP
CREAT SERPL-MCNC: 0.44 MG/DL — LOW (ref 0.5–1.3)
DIFF PNL FLD: NEGATIVE — SIGNIFICANT CHANGE UP
GLUCOSE SERPL-MCNC: 93 MG/DL — SIGNIFICANT CHANGE UP (ref 70–99)
GLUCOSE UR QL: NEGATIVE — SIGNIFICANT CHANGE UP
HCT VFR BLD CALC: 27 % — LOW (ref 34.5–45)
HGB BLD-MCNC: 8.4 G/DL — LOW (ref 11.5–15.5)
KETONES UR-MCNC: SIGNIFICANT CHANGE UP
LEUKOCYTE ESTERASE UR-ACNC: NEGATIVE — SIGNIFICANT CHANGE UP
MAGNESIUM SERPL-MCNC: 1.8 MG/DL — SIGNIFICANT CHANGE UP (ref 1.6–2.6)
MCHC RBC-ENTMCNC: 18.3 PG — LOW (ref 27–34)
MCHC RBC-ENTMCNC: 31.1 GM/DL — LOW (ref 32–36)
MCV RBC AUTO: 58.7 FL — LOW (ref 80–100)
NITRITE UR-MCNC: NEGATIVE — SIGNIFICANT CHANGE UP
NRBC # BLD: 0 /100 WBCS — SIGNIFICANT CHANGE UP (ref 0–0)
PH UR: 7 — SIGNIFICANT CHANGE UP (ref 5–8)
PHOSPHATE SERPL-MCNC: 3.2 MG/DL — SIGNIFICANT CHANGE UP (ref 2.5–4.5)
PLATELET # BLD AUTO: 555 K/UL — HIGH (ref 150–400)
POTASSIUM SERPL-MCNC: 4.4 MMOL/L — SIGNIFICANT CHANGE UP (ref 3.5–5.3)
POTASSIUM SERPL-SCNC: 4.4 MMOL/L — SIGNIFICANT CHANGE UP (ref 3.5–5.3)
PROT UR-MCNC: NEGATIVE — SIGNIFICANT CHANGE UP
RBC # BLD: 4.6 M/UL — SIGNIFICANT CHANGE UP (ref 3.8–5.2)
RBC # FLD: 19.2 % — HIGH (ref 10.3–14.5)
SODIUM SERPL-SCNC: 135 MMOL/L — SIGNIFICANT CHANGE UP (ref 135–145)
SP GR SPEC: 1.01 — SIGNIFICANT CHANGE UP (ref 1.01–1.02)
UROBILINOGEN FLD QL: SIGNIFICANT CHANGE UP
WBC # BLD: 7.25 K/UL — SIGNIFICANT CHANGE UP (ref 3.8–10.5)
WBC # FLD AUTO: 7.25 K/UL — SIGNIFICANT CHANGE UP (ref 3.8–10.5)

## 2020-11-15 RX ORDER — IBUPROFEN 200 MG
400 TABLET ORAL EVERY 6 HOURS
Refills: 0 | Status: DISCONTINUED | OUTPATIENT
Start: 2020-11-15 | End: 2020-11-23

## 2020-11-15 RX ORDER — MAGNESIUM SULFATE 500 MG/ML
2 VIAL (ML) INJECTION ONCE
Refills: 0 | Status: COMPLETED | OUTPATIENT
Start: 2020-11-15 | End: 2020-11-15

## 2020-11-15 RX ORDER — SODIUM CHLORIDE 9 MG/ML
1000 INJECTION, SOLUTION INTRAVENOUS
Refills: 0 | Status: DISCONTINUED | OUTPATIENT
Start: 2020-11-15 | End: 2020-11-16

## 2020-11-15 RX ADMIN — SODIUM CHLORIDE 50 MILLILITER(S): 9 INJECTION, SOLUTION INTRAVENOUS at 13:32

## 2020-11-15 RX ADMIN — LOSARTAN POTASSIUM 100 MILLIGRAM(S): 100 TABLET, FILM COATED ORAL at 05:36

## 2020-11-15 RX ADMIN — MAGNESIUM OXIDE 400 MG ORAL TABLET 1000 MILLIGRAM(S): 241.3 TABLET ORAL at 17:09

## 2020-11-15 RX ADMIN — Medication 400 MILLIGRAM(S): at 21:40

## 2020-11-15 RX ADMIN — Medication 975 MILLIGRAM(S): at 13:26

## 2020-11-15 RX ADMIN — Medication 975 MILLIGRAM(S): at 11:52

## 2020-11-15 RX ADMIN — MAGNESIUM OXIDE 400 MG ORAL TABLET 1000 MILLIGRAM(S): 241.3 TABLET ORAL at 10:19

## 2020-11-15 RX ADMIN — Medication 975 MILLIGRAM(S): at 18:30

## 2020-11-15 RX ADMIN — Medication 50 GRAM(S): at 10:19

## 2020-11-15 RX ADMIN — Medication 400 MILLIGRAM(S): at 21:11

## 2020-11-15 RX ADMIN — CARVEDILOL PHOSPHATE 6.25 MILLIGRAM(S): 80 CAPSULE, EXTENDED RELEASE ORAL at 05:35

## 2020-11-15 RX ADMIN — CARVEDILOL PHOSPHATE 6.25 MILLIGRAM(S): 80 CAPSULE, EXTENDED RELEASE ORAL at 17:09

## 2020-11-15 RX ADMIN — Medication 975 MILLIGRAM(S): at 18:06

## 2020-11-15 RX ADMIN — Medication 400 MILLIGRAM(S): at 17:38

## 2020-11-15 RX ADMIN — Medication 975 MILLIGRAM(S): at 05:36

## 2020-11-15 RX ADMIN — Medication 975 MILLIGRAM(S): at 06:06

## 2020-11-15 RX ADMIN — Medication 400 MILLIGRAM(S): at 17:09

## 2020-11-15 RX ADMIN — ENOXAPARIN SODIUM 40 MILLIGRAM(S): 100 INJECTION SUBCUTANEOUS at 11:52

## 2020-11-15 NOTE — PROGRESS NOTE ADULT - ASSESSMENT
70y female POD#5 s/p LAR with diverting loop ileostomy, R salpingoophorectomy, D&C, post op course c/b ileus.      - No GYN concerns, as per GYN  - Diet: CLD  - Will d/c PO pain meds and give IV pain meds as needed along with PO tylenol  - Venodynes, Lovenox for DVT ppx   - OOB, ambulation   - Will f/u AM labs  - PT: d/c home with no skilled needs    Red surgery  [id number]

## 2020-11-15 NOTE — PROGRESS NOTE ADULT - SUBJECTIVE AND OBJECTIVE BOX
no events, increased stoma function.  denies nausea    abd soft stoma w/ function, inc intact        Objective:    MEDICATIONS  (STANDING):  acetaminophen   Tablet .. 975 milliGRAM(s) Oral every 6 hours  carvedilol 6.25 milliGRAM(s) Oral every 12 hours  dextrose 5% + sodium chloride 0.9% 1000 milliLiter(s) (75 mL/Hr) IV Continuous <Continuous>  enoxaparin Injectable 40 milliGRAM(s) SubCutaneous daily  losartan 100 milliGRAM(s) Oral daily  magnesium oxide 1000 milliGRAM(s) Oral two times a day with meals    MEDICATIONS  (PRN):  HYDROmorphone  Injectable 0.5 milliGRAM(s) IV Push every 4 hours PRN Severe Pain (7 - 10)      Vital Signs Last 24 Hrs  T(C): 36.4 (15 Nov 2020 05:30), Max: 36.8 (14 Nov 2020 22:28)  T(F): 97.5 (15 Nov 2020 05:30), Max: 98.2 (14 Nov 2020 22:28)  HR: 88 (15 Nov 2020 05:30) (80 - 88)  BP: 171/81 (15 Nov 2020 05:30) (156/84 - 171/81)  BP(mean): --  RR: 18 (15 Nov 2020 05:30) (18 - 18)  SpO2: 95% (15 Nov 2020 05:30) (95% - 98%)    I&O's Detail    14 Nov 2020 07:01  -  15 Nov 2020 07:00  --------------------------------------------------------  IN:    dextrose 5% + sodium chloride 0.9% w/ Additives: 1800 mL    Oral Fluid: 180 mL  Total IN: 1980 mL    OUT:    Ileostomy (mL): 825 mL    Voided (mL): 2550 mL  Total OUT: 3375 mL    Total NET: -1395 mL          Daily     Daily     LABS:                        8.4    7.25  )-----------( 555      ( 15 Nov 2020 06:58 )             27.0     11-15    135  |  100  |  4<L>  ----------------------------<  93  4.4   |  27  |  0.44<L>    Ca    9.0      15 Nov 2020 06:58  Phos  3.2     11-15  Mg     1.8     11-15            RADIOLOGY & ADDITIONAL STUDIES:

## 2020-11-15 NOTE — PROGRESS NOTE ADULT - SUBJECTIVE AND OBJECTIVE BOX
GENERAL SURGERY PROGRESS NOTE    Patient: AJ ESPINOZA , 70y (52)Female   MRN: [id number]  Location: [facility] 2MON 202 W1  Visit: 11-10-20 Inpatient  Date: 11-15-20 @ 09:21      Procedure: ERAS open LAR, diverting loop ileostomy, R salpingooophorectomy, D&C    Events of past 24 hours:  - Given sips and chips  - NAEO    Subjective: Patient seen and examined at bedside. States she is doing well this AM. Reports she has noticed some blood in urine, no dysuria and hematuria improving. Pain controlled this AM. Ambulating and voiding independently without issue. Ostomy fxn +/+.    PAST MEDICAL & SURGICAL HISTORY:  HTN (hypertension)    History of thalassemia minor    Breast CA  with radiation    MVP (mitral valve prolapse)  diagnosed 30 years ago    Diverticulitis    H/O lumpectomy   right side    Previous  section  X2    History of unilateral salpingectomy  left    S/P endometrial ablation        Vitals: T(F): 97.5 (11-15-20 @ 05:30), Max: 98.2 (20 @ 22:28)  HR: 88 (11-15-20 @ 05:30)  BP: 171/81 (11-15-20 @ 05:30)  RR: 18 (11-15-20 @ 05:30)  SpO2: 95% (11-15-20 @ 05:30)      Diet, Clear Liquid:   Fiber/Residue Restricted (LOWFIBER)      20 @ 07:01  -  11-15-20 @ 07:00  --------------------------------------------------------  IN:    dextrose 5% + sodium chloride 0.9% w/ Additives: 1800 mL    Oral Fluid: 180 mL  Total IN: 1980 mL    OUT:    Ileostomy (mL): 825 mL    Voided (mL): 2550 mL  Total OUT: 3375 mL    Total NET: -1395 mL          PHYSICAL EXAM  General: NAD, resting comfortably  Respiratory: nonlabored breathing, normal chest wall expansion  Abdominal: Soft, NT, ND, incisions c/d/i, ostomy pink and viable with stool and gas in bag  Vascular: Warm and well perfused  Extremities: No edema    LAB/STUDIES:  CAPILLARY BLOOD GLUCOSE                              8.4    7.25  )-----------( 555      ( 15 Nov 2020 06:58 )             27.0     11-15    135  |  100  |  4<L>  ----------------------------<  93  4.4   |  27  |  0.44<L>    Ca    9.0      15 Nov 2020 06:58  Phos  3.2     11-15  Mg     1.8     11-15

## 2020-11-15 NOTE — PROGRESS NOTE ADULT - SUBJECTIVE AND OBJECTIVE BOX
R2 ROMINA Progress Note    POD#5   HD#6    Patient seen and examined at bedside.  No acute events overnight. No acute complaints.  Pain well controlled.  Patient continue to be kept NPO yesterday and overnight due to ileus.  Reports improvement in ileostomy output w/ increased volume and gas.  Patient states feels bloated but abdomen is softer than yetserday.  Patient is ambulating, reports spending a large portion of the day out bed yesterday.  Patient is voiding spontaneously.   Denies CP, SOB, N/V, fevers, and chills.    Vital Signs Last 24 Hours  T(C): 36.4 (11-15-20 @ 05:30), Max: 36.8 (11-14-20 @ 22:28)  HR: 88 (11-15-20 @ 05:30) (80 - 88)  BP: 171/81 (11-15-20 @ 05:30) (156/84 - 171/81)  RR: 18 (11-15-20 @ 05:30) (18 - 18)  SpO2: 95% (11-15-20 @ 05:30) (95% - 98%)    I&O's Summary    13 Nov 2020 07:01  -  14 Nov 2020 07:00  --------------------------------------------------------  IN: 2410 mL / OUT: 2300 mL / NET: 110 mL    14 Nov 2020 07:01  -  15 Nov 2020 06:35  --------------------------------------------------------  IN: 1980 mL / OUT: 2875 mL / NET: -895 mL        Physical Exam:  General: NAD  CV: RR, S1, S2  Lungs: CTA b/l, good air flow b/l   Abdomen: Soft, ileostomy bag w/ adequate output and air, softly distended, normoactive bowel sounds  Ext: warm and well perfused    Labs:                        8.6    7.29  )-----------( 498      ( 14 Nov 2020 07:19 )             28.2   baso x      eos x      imm gran x      lymph x      mono x      poly x                            8.5    9.90  )-----------( 439      ( 13 Nov 2020 07:16 )             27.2   baso x      eos x      imm gran x      lymph x      mono x      poly x                            8.1    12.96 )-----------( 465      ( 12 Nov 2020 11:26 )             25.5   baso x      eos x      imm gran x      lymph x      mono x      poly x                            7.4    12.03 )-----------( 377      ( 12 Nov 2020 07:16 )             23.5   baso x      eos x      imm gran x      lymph x      mono x      poly x          MEDICATIONS  (STANDING):  acetaminophen   Tablet .. 975 milliGRAM(s) Oral every 6 hours  carvedilol 6.25 milliGRAM(s) Oral every 12 hours  dextrose 5% + sodium chloride 0.9% 1000 milliLiter(s) (75 mL/Hr) IV Continuous <Continuous>  enoxaparin Injectable 40 milliGRAM(s) SubCutaneous daily  losartan 100 milliGRAM(s) Oral daily  magnesium oxide 1000 milliGRAM(s) Oral two times a day with meals    MEDICATIONS  (PRN):  HYDROmorphone  Injectable 0.5 milliGRAM(s) IV Push every 4 hours PRN Severe Pain (7 - 10)

## 2020-11-15 NOTE — PROGRESS NOTE ADULT - ASSESSMENT
Assessment/Plan: 70y female POD#5, s/p LAR with diverting loop ileostomy, R salpingoophorectomy, D&C. Stable with new postoperative ileus.  Patient transitioned to NPO w/ resolution of nausea and vomiting, ileostomy bag output improving w/ indications of improving ileus.    -advance diet per surgery team recommendations  -no gyn concerns at this time  -gyn will continue to follow  -appreciate excellent care by primary team    Zoe Orosco PGY2

## 2020-11-16 LAB
ANION GAP SERPL CALC-SCNC: 10 MMOL/L — SIGNIFICANT CHANGE UP (ref 5–17)
BUN SERPL-MCNC: 4 MG/DL — LOW (ref 7–23)
CALCIUM SERPL-MCNC: 9.4 MG/DL — SIGNIFICANT CHANGE UP (ref 8.4–10.5)
CHLORIDE SERPL-SCNC: 94 MMOL/L — LOW (ref 96–108)
CO2 SERPL-SCNC: 27 MMOL/L — SIGNIFICANT CHANGE UP (ref 22–31)
CREAT SERPL-MCNC: 0.45 MG/DL — LOW (ref 0.5–1.3)
GLUCOSE SERPL-MCNC: 102 MG/DL — HIGH (ref 70–99)
HCT VFR BLD CALC: 30.3 % — LOW (ref 34.5–45)
HGB BLD-MCNC: 9.2 G/DL — LOW (ref 11.5–15.5)
MAGNESIUM SERPL-MCNC: 2 MG/DL — SIGNIFICANT CHANGE UP (ref 1.6–2.6)
MCHC RBC-ENTMCNC: 18.1 PG — LOW (ref 27–34)
MCHC RBC-ENTMCNC: 30.4 GM/DL — LOW (ref 32–36)
MCV RBC AUTO: 59.5 FL — LOW (ref 80–100)
NRBC # BLD: 0 /100 WBCS — SIGNIFICANT CHANGE UP (ref 0–0)
PHOSPHATE SERPL-MCNC: 3.6 MG/DL — SIGNIFICANT CHANGE UP (ref 2.5–4.5)
PLATELET # BLD AUTO: 600 K/UL — HIGH (ref 150–400)
POTASSIUM SERPL-MCNC: 4 MMOL/L — SIGNIFICANT CHANGE UP (ref 3.5–5.3)
POTASSIUM SERPL-SCNC: 4 MMOL/L — SIGNIFICANT CHANGE UP (ref 3.5–5.3)
RBC # BLD: 5.09 M/UL — SIGNIFICANT CHANGE UP (ref 3.8–5.2)
RBC # FLD: 19.2 % — HIGH (ref 10.3–14.5)
SODIUM SERPL-SCNC: 131 MMOL/L — LOW (ref 135–145)
WBC # BLD: 8.2 K/UL — SIGNIFICANT CHANGE UP (ref 3.8–10.5)
WBC # FLD AUTO: 8.2 K/UL — SIGNIFICANT CHANGE UP (ref 3.8–10.5)

## 2020-11-16 RX ORDER — PANTOPRAZOLE SODIUM 20 MG/1
40 TABLET, DELAYED RELEASE ORAL DAILY
Refills: 0 | Status: DISCONTINUED | OUTPATIENT
Start: 2020-11-16 | End: 2020-11-16

## 2020-11-16 RX ORDER — ONDANSETRON 8 MG/1
4 TABLET, FILM COATED ORAL ONCE
Refills: 0 | Status: COMPLETED | OUTPATIENT
Start: 2020-11-16 | End: 2020-11-16

## 2020-11-16 RX ORDER — HYDROMORPHONE HYDROCHLORIDE 2 MG/ML
0.5 INJECTION INTRAMUSCULAR; INTRAVENOUS; SUBCUTANEOUS EVERY 4 HOURS
Refills: 0 | Status: DISCONTINUED | OUTPATIENT
Start: 2020-11-16 | End: 2020-11-18

## 2020-11-16 RX ORDER — DEXTROSE MONOHYDRATE, SODIUM CHLORIDE, AND POTASSIUM CHLORIDE 50; .745; 4.5 G/1000ML; G/1000ML; G/1000ML
1000 INJECTION, SOLUTION INTRAVENOUS
Refills: 0 | Status: DISCONTINUED | OUTPATIENT
Start: 2020-11-16 | End: 2020-11-19

## 2020-11-16 RX ORDER — PANTOPRAZOLE SODIUM 20 MG/1
40 TABLET, DELAYED RELEASE ORAL
Refills: 0 | Status: DISCONTINUED | OUTPATIENT
Start: 2020-11-16 | End: 2020-11-23

## 2020-11-16 RX ORDER — ACETAMINOPHEN 500 MG
650 TABLET ORAL EVERY 6 HOURS
Refills: 0 | Status: DISCONTINUED | OUTPATIENT
Start: 2020-11-16 | End: 2020-11-18

## 2020-11-16 RX ORDER — CALCIUM CARBONATE 500(1250)
1 TABLET ORAL THREE TIMES A DAY
Refills: 0 | Status: DISCONTINUED | OUTPATIENT
Start: 2020-11-16 | End: 2020-11-23

## 2020-11-16 RX ORDER — HYDROMORPHONE HYDROCHLORIDE 2 MG/ML
0.5 INJECTION INTRAMUSCULAR; INTRAVENOUS; SUBCUTANEOUS EVERY 4 HOURS
Refills: 0 | Status: DISCONTINUED | OUTPATIENT
Start: 2020-11-16 | End: 2020-11-16

## 2020-11-16 RX ADMIN — Medication 975 MILLIGRAM(S): at 05:27

## 2020-11-16 RX ADMIN — Medication 650 MILLIGRAM(S): at 16:24

## 2020-11-16 RX ADMIN — ONDANSETRON 4 MILLIGRAM(S): 8 TABLET, FILM COATED ORAL at 11:14

## 2020-11-16 RX ADMIN — Medication 975 MILLIGRAM(S): at 05:50

## 2020-11-16 RX ADMIN — PANTOPRAZOLE SODIUM 40 MILLIGRAM(S): 20 TABLET, DELAYED RELEASE ORAL at 08:20

## 2020-11-16 RX ADMIN — CARVEDILOL PHOSPHATE 6.25 MILLIGRAM(S): 80 CAPSULE, EXTENDED RELEASE ORAL at 05:27

## 2020-11-16 RX ADMIN — Medication 1 TABLET(S): at 18:05

## 2020-11-16 RX ADMIN — CARVEDILOL PHOSPHATE 6.25 MILLIGRAM(S): 80 CAPSULE, EXTENDED RELEASE ORAL at 17:47

## 2020-11-16 RX ADMIN — Medication 650 MILLIGRAM(S): at 15:54

## 2020-11-16 RX ADMIN — LOSARTAN POTASSIUM 100 MILLIGRAM(S): 100 TABLET, FILM COATED ORAL at 05:26

## 2020-11-16 RX ADMIN — Medication 1 TABLET(S): at 21:03

## 2020-11-16 RX ADMIN — ENOXAPARIN SODIUM 40 MILLIGRAM(S): 100 INJECTION SUBCUTANEOUS at 11:16

## 2020-11-16 NOTE — CHART NOTE - NSCHARTNOTEFT_GEN_A_CORE
Nutrition Follow Up Note  Patient seen for: nutrition follow-up for inadequate diet order     Chart reviewed, events noted. This is a "70y female POD#5 s/p LAR with diverting loop ileostomy, R salpingoophorectomy, D&C, post op course c/b ileus."    Source: patient, medical record     Diet : Clear Liquid diet     Pt previously advanced to low fiber diet on 11/12, developed nausea, emesis, down graded to NPO status from 11/12-11/15, now on clear liquid diet since yesterday 11/15. Patient reports good tolerance to clear liquid diet yesterday, no nausea, emesis noted. Ostomy output: 925ml (11/15). Reports increased feeling of acid reflux this morning, received Protonix. Pt sipping on clear liquids slowly today. Encouraged intake of protein-fortified menu items as tolerated. Previously educated pt on low fiber diet however pt with additional diet related questions. Reviewed diet recommendations, all questions answered to patient's satisfaction. Made aware RD remains available as needed for continued diet education and  reinforcement.     PO intake : ~50%     Source for PO intake: patient      Enteral /Parenteral Nutrition: N/A    Weights:  no new weights to assess     Pertinent Medications: MEDICATIONS  (STANDING):  carvedilol 6.25 milliGRAM(s) Oral every 12 hours  dextrose 5% + sodium chloride 0.45%. 1000 milliLiter(s) (50 mL/Hr) IV Continuous <Continuous>  enoxaparin Injectable 40 milliGRAM(s) SubCutaneous daily  ibuprofen  Tablet. 400 milliGRAM(s) Oral every 6 hours  losartan 100 milliGRAM(s) Oral daily  magnesium oxide 1000 milliGRAM(s) Oral two times a day with meals  pantoprazole    Tablet 40 milliGRAM(s) Oral before breakfast    MEDICATIONS  (PRN):  acetaminophen   Tablet .. 650 milliGRAM(s) Oral every 6 hours PRN Mild Pain (1 - 3), Moderate Pain (4 - 6), Severe Pain (7 - 10)  HYDROmorphone  Injectable 0.5 milliGRAM(s) IV Push every 4 hours PRN Moderate to Severe Pain (4-10)    Pertinent Labs: 11-16 @ 06:36: Na 131<L>, BUN 4<L>, Cr 0.45<L>, <H>, K+ 4.0, Phos 3.6, Mg 2.0, Alk Phos --, ALT/SGPT --, AST/SGOT --, HbA1c -      Skin per nursing documentation: free of pressure injuries per nursing flow sheets, midline abdominal surgical incision   Edema: +1 left/right foot     Estimated Needs:   [ x] no change since previous assessment  [ ] recalculated:     Previous Nutrition Diagnosis:  Food & Nutrition Related Knowledge Deficit.   Nutrition Diagnosis is: on going, being addressed with diet education reinforcement     New Nutrition Diagnosis: Inadequate protein-energy intake   Related to: inability to consume adequate nutrition in the setting of altered GI function (s/p new ileostomy with course complicated by ileus)   As evidenced by: pt intermittently NPO/clear liquid diet order ~ 5-6 days     Interventions:   Recommend  1) Medical team to advance diet when medically feasible. Consider advancing to low fiber diet as tolerated.  2) Recommend addition of Ensure Clear BID to supplement PO intake as needed.   3) RD to remain available and follow-up as medically appropriate.     Monitoring and Evaluation:     Continue to monitor Nutritional intake, Tolerance to diet prescription, weights, labs, skin integrity    RD remains available upon request and will follow up per protocol  Vickie Aleman RD, CDN, Pager # [phone]

## 2020-11-16 NOTE — PROGRESS NOTE ADULT - ASSESSMENT
70y female POD#5 s/p LAR with diverting loop ileostomy, R salpingoophorectomy, D&C, post op course c/b ileus.      - Red stoma rubber catheter to be placed this AM. Discussed with ostomy nurse.   - Diet: Will continue with clears  - PO protonix ordered daily  - Will d/c PO pain meds and give IV pain meds as needed along with PO tylenol  - Venodynes, Lovenox for DVT ppx   - OOB, ambulation   - Will f/u AM labs  - PT: d/c home with no skilled needs  - No acute GYN concerns, as per GYN    Red surgery  [id number]

## 2020-11-16 NOTE — PROGRESS NOTE ADULT - SUBJECTIVE AND OBJECTIVE BOX
SURGERY DAILY PROGRESS NOTE:     SUBJECTIVE/ROS: Patient feels well. She reports an increase in acid reflux over night.   Denies nausea, vomiting, chest pain, shortness of breath     MEDICATIONS  (STANDING):  carvedilol 6.25 milliGRAM(s) Oral every 12 hours  dextrose 5% + sodium chloride 0.45%. 1000 milliLiter(s) (50 mL/Hr) IV Continuous <Continuous>  enoxaparin Injectable 40 milliGRAM(s) SubCutaneous daily  ibuprofen  Tablet. 400 milliGRAM(s) Oral every 6 hours  losartan 100 milliGRAM(s) Oral daily  magnesium oxide 1000 milliGRAM(s) Oral two times a day with meals  pantoprazole    Tablet 40 milliGRAM(s) Oral before breakfast    MEDICATIONS  (PRN):  acetaminophen   Tablet .. 650 milliGRAM(s) Oral every 6 hours PRN Mild Pain (1 - 3), Moderate Pain (4 - 6), Severe Pain (7 - 10)  HYDROmorphone  Injectable 0.5 milliGRAM(s) IV Push every 4 hours PRN Moderate to Severe Pain (4-10)      OBJECTIVE:  Vital Signs Last 24 Hrs  T(C): 36.9 (2020 05:33), Max: 36.9 (2020 05:33)  T(F): 98.4 (2020 05:33), Max: 98.4 (2020 05:33)  HR: 85 (2020 05:33) (74 - 91)  BP: 148/60 (2020 06:00) (148/60 - 180/73)  BP(mean): --  RR: 18 (2020 05:33) (17 - 18)  SpO2: 98% (2020 05:33) (97% - 99%)        I&O's Detail    15 Nov 2020 07:01  -  2020 07:00  --------------------------------------------------------  IN:    dextrose 5% + sodium chloride 0.45%: 1200 mL    IV PiggyBack: 50 mL    Oral Fluid: 460 mL  Total IN: 1710 mL    OUT:    Ileostomy (mL): 925 mL    Voided (mL): 1550 mL  Total OUT: 2475 mL    Total NET: -765 mL          Daily     Daily     LABS:                        9.2    8.20  )-----------( 600      ( 2020 06:41 )             30.3     11-16    131<L>  |  94<L>  |  4<L>  ----------------------------<  102<H>  4.0   |  27  |  0.45<L>    Ca    9.4      2020 06:36  Phos  3.6     11-  Mg     2.0     -        Urinalysis Basic - ( 15 Nov 2020 18:25 )    Color: Light Yellow / Appearance: Clear / S.014 / pH: x  Gluc: x / Ketone: Trace  / Bili: Negative / Urobili: <2 mg/dL   Blood: x / Protein: Negative / Nitrite: Negative   Leuk Esterase: Negative / RBC: x / WBC x   Sq Epi: x / Non Sq Epi: x / Bacteria: x      PHYSICAL EXAM  General: NAD, resting comfortably  Respiratory: nonlabored breathing, normal chest wall expansion  Abdominal: Soft, NT, ND, incisions c/d/i, ostomy pink and viable with stool in bag  Vascular: Warm and well perfused  Extremities: No edema

## 2020-11-16 NOTE — PROGRESS NOTE ADULT - SUBJECTIVE AND OBJECTIVE BOX
R3 GYN Progress Note    POD# 6  HD#7    Patient seen and examined at bedside.  No acute events overnight. Pt had some mild acid reflux this morning. She was given medication for it a few minutes ago.   Pain well controlled.  Patient is ambulating and is tolerating CLD. Colostomy in place.   [place]lejo is still in place.   She had some mild vaginal spotting when she wipes after going to the bathroom.  Denies CP, SOB, N/V, fevers, and chills.    Vital Signs Last 24 Hours  T(C): 36.9 (11-16-20 @ 05:33), Max: 36.9 (11-16-20 @ 05:33)  HR: 85 (11-16-20 @ 05:33) (74 - 91)  BP: 160/81 (11-16-20 @ 05:33) (160/81 - 180/73)  RR: 18 (11-16-20 @ 05:33) (17 - 18)  SpO2: 98% (11-16-20 @ 05:33) (97% - 99%)    I&O's Summary    14 Nov 2020 07:01  -  15 Nov 2020 07:00  --------------------------------------------------------  IN: 1980 mL / OUT: 3375 mL / NET: -1395 mL    15 Nov 2020 07:01  -  16 Nov 2020 06:39  --------------------------------------------------------  IN: 1710 mL / OUT: 2475 mL / NET: -765 mL        Physical Exam:  General: NAD  CV: RR, S1, S2, no M/R/G  Lungs: CTA b/l, good air flow b/l   Abdomen: Soft, appropriately-tender, softly distended, tympanic, normoactive bowel sounds  Incision: CDI  : Minimal spotting on pad  Ext: No pain or swelling     Labs:                        8.4    7.25  )-----------( 555      ( 15 Nov 2020 06:58 )             27.0   baso x      eos x      imm gran x      lymph x      mono x      poly x                            8.6    7.29  )-----------( 498      ( 14 Nov 2020 07:19 )             28.2   baso x      eos x      imm gran x      lymph x      mono x      poly x                            8.5    9.90  )-----------( 439      ( 13 Nov 2020 07:16 )             27.2   baso x      eos x      imm gran x      lymph x      mono x      poly x          MEDICATIONS  (STANDING):  carvedilol 6.25 milliGRAM(s) Oral every 12 hours  dextrose 5% + sodium chloride 0.45%. 1000 milliLiter(s) (50 mL/Hr) IV Continuous <Continuous>  enoxaparin Injectable 40 milliGRAM(s) SubCutaneous daily  ibuprofen  Tablet. 400 milliGRAM(s) Oral every 6 hours  losartan 100 milliGRAM(s) Oral daily  magnesium oxide 1000 milliGRAM(s) Oral two times a day with meals    MEDICATIONS  (PRN):  HYDROmorphone  Injectable 0.5 milliGRAM(s) IV Push every 4 hours PRN Severe Pain (7 - 10)

## 2020-11-16 NOTE — PROGRESS NOTE ADULT - ASSESSMENT
A/P: 70y POD# 6  s/p LAR with diverting loop ileostomy, R salpingoophorectomy, D&C. Stable with new postoperative ileus.  Patient transitioned to NPO w/ resolution of nausea and vomiting, ileostomy bag output improving w/ indications of improving ileus. Transitioned to CLD which she is tolerating w/o nausea.    -Appreciate Surgery team care  -Diet per Surgery  -Minimal vaginal bleeding on exam, will continue to monitor with pad counts  -No acute GYN concerns at this point.   -GYN will continue to follow.    Gavin Wright PGY-3

## 2020-11-17 LAB
ANION GAP SERPL CALC-SCNC: 7 MMOL/L — SIGNIFICANT CHANGE UP (ref 5–17)
BUN SERPL-MCNC: 5 MG/DL — LOW (ref 7–23)
CALCIUM SERPL-MCNC: 8.9 MG/DL — SIGNIFICANT CHANGE UP (ref 8.4–10.5)
CHLORIDE SERPL-SCNC: 99 MMOL/L — SIGNIFICANT CHANGE UP (ref 96–108)
CO2 SERPL-SCNC: 26 MMOL/L — SIGNIFICANT CHANGE UP (ref 22–31)
CREAT SERPL-MCNC: 0.54 MG/DL — SIGNIFICANT CHANGE UP (ref 0.5–1.3)
GLUCOSE SERPL-MCNC: 108 MG/DL — HIGH (ref 70–99)
HCT VFR BLD CALC: 25.9 % — LOW (ref 34.5–45)
HGB BLD-MCNC: 8.2 G/DL — LOW (ref 11.5–15.5)
MAGNESIUM SERPL-MCNC: 1.8 MG/DL — SIGNIFICANT CHANGE UP (ref 1.6–2.6)
MCHC RBC-ENTMCNC: 18.5 PG — LOW (ref 27–34)
MCHC RBC-ENTMCNC: 31.7 GM/DL — LOW (ref 32–36)
MCV RBC AUTO: 58.5 FL — LOW (ref 80–100)
NRBC # BLD: 0 /100 WBCS — SIGNIFICANT CHANGE UP (ref 0–0)
PHOSPHATE SERPL-MCNC: 3.7 MG/DL — SIGNIFICANT CHANGE UP (ref 2.5–4.5)
PLATELET # BLD AUTO: 556 K/UL — HIGH (ref 150–400)
POTASSIUM SERPL-MCNC: 4.3 MMOL/L — SIGNIFICANT CHANGE UP (ref 3.5–5.3)
POTASSIUM SERPL-SCNC: 4.3 MMOL/L — SIGNIFICANT CHANGE UP (ref 3.5–5.3)
RBC # BLD: 4.43 M/UL — SIGNIFICANT CHANGE UP (ref 3.8–5.2)
RBC # FLD: 18.7 % — HIGH (ref 10.3–14.5)
SARS-COV-2 RNA SPEC QL NAA+PROBE: SIGNIFICANT CHANGE UP
SODIUM SERPL-SCNC: 132 MMOL/L — LOW (ref 135–145)
SURGICAL PATHOLOGY STUDY: SIGNIFICANT CHANGE UP
WBC # BLD: 7.9 K/UL — SIGNIFICANT CHANGE UP (ref 3.8–10.5)
WBC # FLD AUTO: 7.9 K/UL — SIGNIFICANT CHANGE UP (ref 3.8–10.5)

## 2020-11-17 RX ORDER — MAGNESIUM SULFATE 500 MG/ML
2 VIAL (ML) INJECTION ONCE
Refills: 0 | Status: COMPLETED | OUTPATIENT
Start: 2020-11-17 | End: 2020-11-17

## 2020-11-17 RX ADMIN — DEXTROSE MONOHYDRATE, SODIUM CHLORIDE, AND POTASSIUM CHLORIDE 110 MILLILITER(S): 50; .745; 4.5 INJECTION, SOLUTION INTRAVENOUS at 11:49

## 2020-11-17 RX ADMIN — Medication 650 MILLIGRAM(S): at 05:11

## 2020-11-17 RX ADMIN — Medication 1 TABLET(S): at 05:11

## 2020-11-17 RX ADMIN — CARVEDILOL PHOSPHATE 6.25 MILLIGRAM(S): 80 CAPSULE, EXTENDED RELEASE ORAL at 05:11

## 2020-11-17 RX ADMIN — Medication 400 MILLIGRAM(S): at 21:58

## 2020-11-17 RX ADMIN — Medication 50 GRAM(S): at 08:15

## 2020-11-17 RX ADMIN — Medication 650 MILLIGRAM(S): at 11:48

## 2020-11-17 RX ADMIN — Medication 400 MILLIGRAM(S): at 17:06

## 2020-11-17 RX ADMIN — Medication 1 TABLET(S): at 21:58

## 2020-11-17 RX ADMIN — CARVEDILOL PHOSPHATE 6.25 MILLIGRAM(S): 80 CAPSULE, EXTENDED RELEASE ORAL at 17:06

## 2020-11-17 RX ADMIN — PANTOPRAZOLE SODIUM 40 MILLIGRAM(S): 20 TABLET, DELAYED RELEASE ORAL at 05:11

## 2020-11-17 RX ADMIN — Medication 650 MILLIGRAM(S): at 05:41

## 2020-11-17 RX ADMIN — LOSARTAN POTASSIUM 100 MILLIGRAM(S): 100 TABLET, FILM COATED ORAL at 05:11

## 2020-11-17 RX ADMIN — Medication 1 TABLET(S): at 14:13

## 2020-11-17 RX ADMIN — ENOXAPARIN SODIUM 40 MILLIGRAM(S): 100 INJECTION SUBCUTANEOUS at 11:49

## 2020-11-17 RX ADMIN — DEXTROSE MONOHYDRATE, SODIUM CHLORIDE, AND POTASSIUM CHLORIDE 110 MILLILITER(S): 50; .745; 4.5 INJECTION, SOLUTION INTRAVENOUS at 00:30

## 2020-11-17 RX ADMIN — Medication 650 MILLIGRAM(S): at 12:18

## 2020-11-17 NOTE — PROGRESS NOTE ADULT - ASSESSMENT
70y female POD#6 s/p LAR with diverting loop ileostomy, R salpingoophorectomy, D&C, post op course c/b ileus.      - Red stoma rubber catheter malpositioned this AM. Will discuss with ostomy nurse.   - Diet: NPO w/ Ice chips  - PO protonix ordered daily  - Will d/c PO pain meds and give IV pain meds as needed along with PO tylenol  - Venodynes, Lovenox for DVT ppx   - OOB, ambulation   - Will f/u AM labs  - PT: d/c home with no skilled needs  - No acute GYN concerns, as per GYN

## 2020-11-17 NOTE — PROGRESS NOTE ADULT - PROBLEM SELECTOR PLAN 1
-Appreciate Surgery team care  -Diet per Surgery  -Minimal vaginal bleeding on exam, will continue to monitor with pad counts  -No acute GYN concerns at this point.   -GYN will continue to follow.    Gavin Wright PGY-3

## 2020-11-17 NOTE — PROGRESS NOTE ADULT - ASSESSMENT
A/P: 70y POD# 8  s/p  s/p LAR with diverting loop ileostomy, R salpingoophorectomy, D&C. Pt was regressed to NPO yesterday after episode of vomiting. Ostomy tubing changed and the patient feels better with improved pain/distention

## 2020-11-17 NOTE — PROGRESS NOTE ADULT - SUBJECTIVE AND OBJECTIVE BOX
R[facility] GYN Progress Note    POD#8  HD#7    Patient seen and examined at bedside.  Yesterday morning, the patient had 1 episode of vomiting. She was made NPO again. Ostomy tubing changed and patient said she feels better and less distended. No acute complaints.  Pain well controlled.  Patient is ambulating. She is still NPO  +Liquid OP in the ostomy bag  Voiding  Denies CP, SOB, N/V, fevers, and chills.    Vital Signs Last 24 Hours  T(C): 36.9 (11-17-20 @ 04:58), Max: 37.1 (11-16-20 @ 13:57)  HR: 86 (11-17-20 @ 04:58) (78 - 89)  BP: 156/85 (11-17-20 @ 04:58) (146/90 - 156/85)  RR: 18 (11-17-20 @ 04:58) (18 - 19)  SpO2: 95% (11-17-20 @ 04:58) (94% - 96%)    I&O's Summary    15 Nov 2020 07:01  -  16 Nov 2020 07:00  --------------------------------------------------------  IN: 1710 mL / OUT: 2475 mL / NET: -765 mL    16 Nov 2020 07:01  -  17 Nov 2020 06:33  --------------------------------------------------------  IN: 2160 mL / OUT: 2350 mL / NET: -190 mL      Physical Exam:  General: NAD  CV: RR, S1, S2, no M/R/G  Lungs: CTA b/l, good air flow b/l   Abdomen: Soft, appropriately-tender, mildly distended, tympanic, normoactive bowel sounds  Ostomy: in place +liquid output  Incision: CDI   : minimal bleeding on pad  Ext: No pain or swelling     Labs:                        9.2    8.20  )-----------( 600      ( 16 Nov 2020 06:41 )             30.3   baso x      eos x      imm gran x      lymph x      mono x      poly x                            8.4    7.25  )-----------( 555      ( 15 Nov 2020 06:58 )             27.0   baso x      eos x      imm gran x      lymph x      mono x      poly x                            8.6    7.29  )-----------( 498      ( 14 Nov 2020 07:19 )             28.2   baso x      eos x      imm gran x      lymph x      mono x      poly x          MEDICATIONS  (STANDING):  calcium carbonate    500 mG (Tums) Chewable 1 Tablet(s) Chew three times a day  carvedilol 6.25 milliGRAM(s) Oral every 12 hours  dextrose 5% + sodium chloride 0.9% with potassium chloride 20 mEq/L 1000 milliLiter(s) (110 mL/Hr) IV Continuous <Continuous>  enoxaparin Injectable 40 milliGRAM(s) SubCutaneous daily  ibuprofen  Tablet. 400 milliGRAM(s) Oral every 6 hours  losartan 100 milliGRAM(s) Oral daily  pantoprazole    Tablet 40 milliGRAM(s) Oral before breakfast    MEDICATIONS  (PRN):  acetaminophen   Tablet .. 650 milliGRAM(s) Oral every 6 hours PRN Mild Pain (1 - 3), Moderate Pain (4 - 6), Severe Pain (7 - 10)  HYDROmorphone  Injectable 0.5 milliGRAM(s) IV Push every 4 hours PRN Moderate to Severe Pain (4-10)

## 2020-11-17 NOTE — PROGRESS NOTE ADULT - SUBJECTIVE AND OBJECTIVE BOX
Surgery Progress Note    SUBJECTIVE: No acute events overnight. Pt seen and examined at bedside. Patient comfortable. No nausea, vomiting, diarrhea. Pt states she feels much since since red rubber cath was placed. Pain is controlled.     Vital Signs Last 24 Hrs  T(C): 36.9 (2020 04:58), Max: 37.1 (2020 13:57)  T(F): 98.4 (2020 04:58), Max: 98.7 (2020 13:57)  HR: 86 (2020 04:58) (78 - 89)  BP: 156/85 (2020 04:58) (146/90 - 156/85)  BP(mean): --  RR: 18 (2020 04:58) (18 - 19)  SpO2: 95% (2020 04:58) (94% - 96%)    General: NAD, resting comfortably  Respiratory: nonlabored breathing, normal chest wall expansion  Abdominal: Soft, NT, ND, incisions c/d/i, ostomy pink and viable with stool in bag.  Red rubber cath dislodged into ostomy bag  Vascular: Warm and well perfused  Extremities: No edema    LABS:                        9.2    8.20  )-----------( 600      ( 2020 06:41 )             30.3     11-16    131<L>  |  94<L>  |  4<L>  ----------------------------<  102<H>  4.0   |  27  |  0.45<L>    Ca    9.4      2020 06:36  Phos  3.6     11-  Mg     2.0     11-16        Urinalysis Basic - ( 15 Nov 2020 18:25 )    Color: Light Yellow / Appearance: Clear / S.014 / pH: x  Gluc: x / Ketone: Trace  / Bili: Negative / Urobili: <2 mg/dL   Blood: x / Protein: Negative / Nitrite: Negative   Leuk Esterase: Negative / RBC: x / WBC x   Sq Epi: x / Non Sq Epi: x / Bacteria: x        INs and OUTs:    11-15-20 @ 07:01  -  20 @ 07:00  --------------------------------------------------------  IN: 1710 mL / OUT: 2475 mL / NET: -765 mL    20 @ 07:01  -  20 @ 06:57  --------------------------------------------------------  IN: 2160 mL / OUT: 2350 mL / NET: -190 mL        Medications:  MEDICATIONS  (STANDING):  calcium carbonate    500 mG (Tums) Chewable 1 Tablet(s) Chew three times a day  carvedilol 6.25 milliGRAM(s) Oral every 12 hours  dextrose 5% + sodium chloride 0.9% with potassium chloride 20 mEq/L 1000 milliLiter(s) (110 mL/Hr) IV Continuous <Continuous>  enoxaparin Injectable 40 milliGRAM(s) SubCutaneous daily  ibuprofen  Tablet. 400 milliGRAM(s) Oral every 6 hours  losartan 100 milliGRAM(s) Oral daily  pantoprazole    Tablet 40 milliGRAM(s) Oral before breakfast    MEDICATIONS  (PRN):  acetaminophen   Tablet .. 650 milliGRAM(s) Oral every 6 hours PRN Mild Pain (1 - 3), Moderate Pain (4 - 6), Severe Pain (7 - 10)  HYDROmorphone  Injectable 0.5 milliGRAM(s) IV Push every 4 hours PRN Moderate to Severe Pain (4-10)   Surgery Progress Note    SUBJECTIVE: No acute events overnight. Pt seen and examined at bedside. Patient comfortable. No nausea, vomiting, diarrhea. Pt states she feels much since since red rubber cath was placed. Pain is controlled.     Vital Signs Last 24 Hrs  T(C): 36.9 (2020 04:58), Max: 37.1 (2020 13:57)  T(F): 98.4 (2020 04:58), Max: 98.7 (2020 13:57)  HR: 86 (2020 04:58) (78 - 89)  BP: 156/85 (2020 04:58) (146/90 - 156/85)  BP(mean): --  RR: 18 (2020 04:58) (18 - 19)  SpO2: 95% (2020 04:58) (94% - 96%)    General: NAD, resting comfortably  Respiratory: nonlabored breathing, normal chest wall expansion  Abdominal: Soft, NT, ND, incisions c/d/i, ostomy pink and viable -/-  Red rubber cath dislodged into ostomy bag  Vascular: Warm and well perfused  Extremities: No edema    LABS:                        9.2    8.20  )-----------( 600      ( 2020 06:41 )             30.3     11-16    131<L>  |  94<L>  |  4<L>  ----------------------------<  102<H>  4.0   |  27  |  0.45<L>    Ca    9.4      2020 06:36  Phos  3.6     -  Mg     2.0     -16        Urinalysis Basic - ( 15 Nov 2020 18:25 )    Color: Light Yellow / Appearance: Clear / S.014 / pH: x  Gluc: x / Ketone: Trace  / Bili: Negative / Urobili: <2 mg/dL   Blood: x / Protein: Negative / Nitrite: Negative   Leuk Esterase: Negative / RBC: x / WBC x   Sq Epi: x / Non Sq Epi: x / Bacteria: x        INs and OUTs:    11-15-20 @ 07:01  -  20 @ 07:00  --------------------------------------------------------  IN: 1710 mL / OUT: 2475 mL / NET: -765 mL    20 @ 07:01  -  20 @ 06:57  --------------------------------------------------------  IN: 2160 mL / OUT: 2350 mL / NET: -190 mL        Medications:  MEDICATIONS  (STANDING):  calcium carbonate    500 mG (Tums) Chewable 1 Tablet(s) Chew three times a day  carvedilol 6.25 milliGRAM(s) Oral every 12 hours  dextrose 5% + sodium chloride 0.9% with potassium chloride 20 mEq/L 1000 milliLiter(s) (110 mL/Hr) IV Continuous <Continuous>  enoxaparin Injectable 40 milliGRAM(s) SubCutaneous daily  ibuprofen  Tablet. 400 milliGRAM(s) Oral every 6 hours  losartan 100 milliGRAM(s) Oral daily  pantoprazole    Tablet 40 milliGRAM(s) Oral before breakfast    MEDICATIONS  (PRN):  acetaminophen   Tablet .. 650 milliGRAM(s) Oral every 6 hours PRN Mild Pain (1 - 3), Moderate Pain (4 - 6), Severe Pain (7 - 10)  HYDROmorphone  Injectable 0.5 milliGRAM(s) IV Push every 4 hours PRN Moderate to Severe Pain (4-10)

## 2020-11-18 LAB
ANION GAP SERPL CALC-SCNC: 8 MMOL/L — SIGNIFICANT CHANGE UP (ref 5–17)
BUN SERPL-MCNC: 5 MG/DL — LOW (ref 7–23)
CALCIUM SERPL-MCNC: 9.3 MG/DL — SIGNIFICANT CHANGE UP (ref 8.4–10.5)
CHLORIDE SERPL-SCNC: 99 MMOL/L — SIGNIFICANT CHANGE UP (ref 96–108)
CO2 SERPL-SCNC: 27 MMOL/L — SIGNIFICANT CHANGE UP (ref 22–31)
CREAT SERPL-MCNC: 0.51 MG/DL — SIGNIFICANT CHANGE UP (ref 0.5–1.3)
GLUCOSE SERPL-MCNC: 105 MG/DL — HIGH (ref 70–99)
HCT VFR BLD CALC: 28.5 % — LOW (ref 34.5–45)
HGB BLD-MCNC: 8.7 G/DL — LOW (ref 11.5–15.5)
MAGNESIUM SERPL-MCNC: 1.9 MG/DL — SIGNIFICANT CHANGE UP (ref 1.6–2.6)
MCHC RBC-ENTMCNC: 18.2 PG — LOW (ref 27–34)
MCHC RBC-ENTMCNC: 30.5 GM/DL — LOW (ref 32–36)
MCV RBC AUTO: 59.6 FL — LOW (ref 80–100)
NRBC # BLD: 0 /100 WBCS — SIGNIFICANT CHANGE UP (ref 0–0)
PHOSPHATE SERPL-MCNC: 3.6 MG/DL — SIGNIFICANT CHANGE UP (ref 2.5–4.5)
PLATELET # BLD AUTO: 567 K/UL — HIGH (ref 150–400)
POTASSIUM SERPL-MCNC: 4.4 MMOL/L — SIGNIFICANT CHANGE UP (ref 3.5–5.3)
POTASSIUM SERPL-SCNC: 4.4 MMOL/L — SIGNIFICANT CHANGE UP (ref 3.5–5.3)
RBC # BLD: 4.78 M/UL — SIGNIFICANT CHANGE UP (ref 3.8–5.2)
RBC # FLD: 19.1 % — HIGH (ref 10.3–14.5)
SODIUM SERPL-SCNC: 134 MMOL/L — LOW (ref 135–145)
WBC # BLD: 7.26 K/UL — SIGNIFICANT CHANGE UP (ref 3.8–10.5)
WBC # FLD AUTO: 7.26 K/UL — SIGNIFICANT CHANGE UP (ref 3.8–10.5)

## 2020-11-18 RX ORDER — OXYCODONE HYDROCHLORIDE 5 MG/1
10 TABLET ORAL EVERY 4 HOURS
Refills: 0 | Status: DISCONTINUED | OUTPATIENT
Start: 2020-11-18 | End: 2020-11-23

## 2020-11-18 RX ORDER — OXYCODONE HYDROCHLORIDE 5 MG/1
5 TABLET ORAL EVERY 4 HOURS
Refills: 0 | Status: DISCONTINUED | OUTPATIENT
Start: 2020-11-18 | End: 2020-11-23

## 2020-11-18 RX ORDER — MAGNESIUM SULFATE 500 MG/ML
1 VIAL (ML) INJECTION ONCE
Refills: 0 | Status: COMPLETED | OUTPATIENT
Start: 2020-11-18 | End: 2020-11-18

## 2020-11-18 RX ORDER — ONDANSETRON 8 MG/1
4 TABLET, FILM COATED ORAL ONCE
Refills: 0 | Status: COMPLETED | OUTPATIENT
Start: 2020-11-18 | End: 2020-11-18

## 2020-11-18 RX ORDER — ACETAMINOPHEN 500 MG
975 TABLET ORAL EVERY 6 HOURS
Refills: 0 | Status: DISCONTINUED | OUTPATIENT
Start: 2020-11-18 | End: 2020-11-23

## 2020-11-18 RX ADMIN — ONDANSETRON 4 MILLIGRAM(S): 8 TABLET, FILM COATED ORAL at 15:45

## 2020-11-18 RX ADMIN — ENOXAPARIN SODIUM 40 MILLIGRAM(S): 100 INJECTION SUBCUTANEOUS at 12:35

## 2020-11-18 RX ADMIN — Medication 400 MILLIGRAM(S): at 06:09

## 2020-11-18 RX ADMIN — Medication 1 TABLET(S): at 21:20

## 2020-11-18 RX ADMIN — Medication 400 MILLIGRAM(S): at 06:41

## 2020-11-18 RX ADMIN — DEXTROSE MONOHYDRATE, SODIUM CHLORIDE, AND POTASSIUM CHLORIDE 50 MILLILITER(S): 50; .745; 4.5 INJECTION, SOLUTION INTRAVENOUS at 19:37

## 2020-11-18 RX ADMIN — CARVEDILOL PHOSPHATE 6.25 MILLIGRAM(S): 80 CAPSULE, EXTENDED RELEASE ORAL at 06:09

## 2020-11-18 RX ADMIN — ONDANSETRON 4 MILLIGRAM(S): 8 TABLET, FILM COATED ORAL at 23:07

## 2020-11-18 RX ADMIN — LOSARTAN POTASSIUM 100 MILLIGRAM(S): 100 TABLET, FILM COATED ORAL at 06:09

## 2020-11-18 RX ADMIN — Medication 100 GRAM(S): at 12:35

## 2020-11-18 RX ADMIN — PANTOPRAZOLE SODIUM 40 MILLIGRAM(S): 20 TABLET, DELAYED RELEASE ORAL at 06:09

## 2020-11-18 RX ADMIN — Medication 1 TABLET(S): at 06:09

## 2020-11-18 RX ADMIN — CARVEDILOL PHOSPHATE 6.25 MILLIGRAM(S): 80 CAPSULE, EXTENDED RELEASE ORAL at 20:03

## 2020-11-18 RX ADMIN — Medication 1 TABLET(S): at 16:17

## 2020-11-18 NOTE — PROGRESS NOTE ADULT - ASSESSMENT
A/P: 70y POD#9  s/p  s/p LAR with diverting loop ileostomy, R salpingoophorectomy, D&C. Pt advanced to clear liquids yesterday, which she tolerated well. Otherwise, patient is clinically improving without pain or distention.

## 2020-11-18 NOTE — PROGRESS NOTE ADULT - ASSESSMENT
70y female POD#7 s/p LAR with diverting loop ileostomy, R salpingoophorectomy, D&C, post op course c/b ileus.      - Red stoma rubber catheter dislodged again. Will leave out  - Diet: CLD. Will adance to LRD for lunch  - PO protonix ordered daily  - Venodynes, Lovenox for DVT ppx   - OOB, ambulation   - Will f/u AM labs  - PT: d/c home with no skilled needs  - No acute GYN concerns, as per GYN 70y female POD#7 s/p LAR with diverting loop ileostomy, R salpingoophorectomy, D&C, post op course c/b ileus.      - Red stoma rubber catheter dislodged again. Will leave out  - Diet: CLD.  - PO protonix ordered daily  - Venodynes, Lovenox for DVT ppx   - OOB, ambulation   - Will f/u AM labs  - PT: d/c home with no skilled needs  - No acute GYN concerns, as per GYN

## 2020-11-18 NOTE — PROGRESS NOTE PEDS - ASSESSMENT
70y female POD#7 s/p LAR with diverting loop ileostomy, R salpingoophorectomy, D&C, post op course c/b ileus.      - Red stoma rubber catheter dislodged. Will leave out  - Diet: NPO w/ Ice chips  - PO protonix ordered daily  - Will d/c PO pain meds and give IV pain meds as needed along with PO tylenol  - Venodynes, Lovenox for DVT ppx   - OOB, ambulation   - Will f/u AM labs  - PT: d/c home with no skilled needs  - No acute GYN concerns, as per GYN

## 2020-11-18 NOTE — PROGRESS NOTE ADULT - SUBJECTIVE AND OBJECTIVE BOX
Surgery Progress Note    SUBJECTIVE: No acute events overnight. Pt seen and examined at bedside. Patient comfortable. No nausea, vomiting, diarrhea. Pain is controlled.  Tolerating diet CLD will advance to LRD for lunch.    Vital Signs Last 24 Hrs  T(C): 36.7 (18 Nov 2020 06:10), Max: 36.9 (17 Nov 2020 13:03)  T(F): 98 (18 Nov 2020 06:10), Max: 98.4 (17 Nov 2020 13:03)  HR: 72 (18 Nov 2020 06:10) (62 - 75)  BP: 155/81 (18 Nov 2020 06:10) (139/77 - 164/82)  BP(mean): --  RR: 18 (18 Nov 2020 06:10) (18 - 18)  SpO2: 97% (18 Nov 2020 06:10) (95% - 97%)    General: NAD, resting comfortably  Respiratory: nonlabored breathing, normal chest wall expansion  Abdominal: Soft, NT, ND, incisions c/d/i, ostomy pink and viable with stool in bag.  Red rubber cath dislodged into ostomy bag again  Vascular: Warm and well perfused  Extremities: No edema  LABS:                        8.2    7.90  )-----------( 556      ( 17 Nov 2020 07:06 )             25.9     11-17    132<L>  |  99  |  5<L>  ----------------------------<  108<H>  4.3   |  26  |  0.54    Ca    8.9      17 Nov 2020 07:06  Phos  3.7     11-17  Mg     1.8     11-17            INs and OUTs:    11-17-20 @ 07:01  -  11-18-20 @ 07:00  --------------------------------------------------------  IN: 2380 mL / OUT: 2320 mL / NET: 60 mL        Medications:  MEDICATIONS  (STANDING):  calcium carbonate    500 mG (Tums) Chewable 1 Tablet(s) Chew three times a day  carvedilol 6.25 milliGRAM(s) Oral every 12 hours  dextrose 5% + sodium chloride 0.9% with potassium chloride 20 mEq/L 1000 milliLiter(s) (75 mL/Hr) IV Continuous <Continuous>  enoxaparin Injectable 40 milliGRAM(s) SubCutaneous daily  ibuprofen  Tablet. 400 milliGRAM(s) Oral every 6 hours  losartan 100 milliGRAM(s) Oral daily  pantoprazole    Tablet 40 milliGRAM(s) Oral before breakfast    MEDICATIONS  (PRN):  acetaminophen   Tablet .. 975 milliGRAM(s) Oral every 6 hours PRN Mild Pain (1 - 3)  oxyCODONE    IR 5 milliGRAM(s) Oral every 4 hours PRN Moderate Pain (4 - 6)  oxyCODONE    IR 10 milliGRAM(s) Oral every 4 hours PRN Severe Pain (7 - 10)   Surgery Progress Note    SUBJECTIVE: No acute events overnight. Pt seen and examined at bedside. Patient comfortable. No nausea, vomiting, diarrhea. Pain is controlled.  CLD.    Vital Signs Last 24 Hrs  T(C): 36.7 (18 Nov 2020 06:10), Max: 36.9 (17 Nov 2020 13:03)  T(F): 98 (18 Nov 2020 06:10), Max: 98.4 (17 Nov 2020 13:03)  HR: 72 (18 Nov 2020 06:10) (62 - 75)  BP: 155/81 (18 Nov 2020 06:10) (139/77 - 164/82)  BP(mean): --  RR: 18 (18 Nov 2020 06:10) (18 - 18)  SpO2: 97% (18 Nov 2020 06:10) (95% - 97%)    General: NAD, resting comfortably  Respiratory: nonlabored breathing, normal chest wall expansion  Abdominal: Soft, NT, ND, incisions c/d/i, ostomy pink and viable. -/-  Red rubber cath dislodged into ostomy bag again  Vascular: Warm and well perfused  Extremities: No edema  LABS:                        8.2    7.90  )-----------( 556      ( 17 Nov 2020 07:06 )             25.9     11-17    132<L>  |  99  |  5<L>  ----------------------------<  108<H>  4.3   |  26  |  0.54    Ca    8.9      17 Nov 2020 07:06  Phos  3.7     11-17  Mg     1.8     11-17            INs and OUTs:    11-17-20 @ 07:01  -  11-18-20 @ 07:00  --------------------------------------------------------  IN: 2380 mL / OUT: 2320 mL / NET: 60 mL        Medications:  MEDICATIONS  (STANDING):  calcium carbonate    500 mG (Tums) Chewable 1 Tablet(s) Chew three times a day  carvedilol 6.25 milliGRAM(s) Oral every 12 hours  dextrose 5% + sodium chloride 0.9% with potassium chloride 20 mEq/L 1000 milliLiter(s) (75 mL/Hr) IV Continuous <Continuous>  enoxaparin Injectable 40 milliGRAM(s) SubCutaneous daily  ibuprofen  Tablet. 400 milliGRAM(s) Oral every 6 hours  losartan 100 milliGRAM(s) Oral daily  pantoprazole    Tablet 40 milliGRAM(s) Oral before breakfast    MEDICATIONS  (PRN):  acetaminophen   Tablet .. 975 milliGRAM(s) Oral every 6 hours PRN Mild Pain (1 - 3)  oxyCODONE    IR 5 milliGRAM(s) Oral every 4 hours PRN Moderate Pain (4 - 6)  oxyCODONE    IR 10 milliGRAM(s) Oral every 4 hours PRN Severe Pain (7 - 10)   Surgery Progress Note    SUBJECTIVE: No acute events overnight. Pt seen and examined at bedside. Patient comfortable. No nausea, vomiting, diarrhea. Pain is controlled.  CLD. -/ + w/ liquid output    Vital Signs Last 24 Hrs  T(C): 36.7 (18 Nov 2020 06:10), Max: 36.9 (17 Nov 2020 13:03)  T(F): 98 (18 Nov 2020 06:10), Max: 98.4 (17 Nov 2020 13:03)  HR: 72 (18 Nov 2020 06:10) (62 - 75)  BP: 155/81 (18 Nov 2020 06:10) (139/77 - 164/82)  BP(mean): --  RR: 18 (18 Nov 2020 06:10) (18 - 18)  SpO2: 97% (18 Nov 2020 06:10) (95% - 97%)    General: NAD, resting comfortably  Respiratory: nonlabored breathing, normal chest wall expansion  Abdominal: Soft, NT, ND, incisions c/d/i, ostomy pink and viable. -/+ w/ liquid output  Red rubber cath dislodged into ostomy bag again  Vascular: Warm and well perfused  Extremities: No edema  LABS:                        8.2    7.90  )-----------( 556      ( 17 Nov 2020 07:06 )             25.9     11-17    132<L>  |  99  |  5<L>  ----------------------------<  108<H>  4.3   |  26  |  0.54    Ca    8.9      17 Nov 2020 07:06  Phos  3.7     11-17  Mg     1.8     11-17            INs and OUTs:    11-17-20 @ 07:01  -  11-18-20 @ 07:00  --------------------------------------------------------  IN: 2380 mL / OUT: 2320 mL / NET: 60 mL        Medications:  MEDICATIONS  (STANDING):  calcium carbonate    500 mG (Tums) Chewable 1 Tablet(s) Chew three times a day  carvedilol 6.25 milliGRAM(s) Oral every 12 hours  dextrose 5% + sodium chloride 0.9% with potassium chloride 20 mEq/L 1000 milliLiter(s) (75 mL/Hr) IV Continuous <Continuous>  enoxaparin Injectable 40 milliGRAM(s) SubCutaneous daily  ibuprofen  Tablet. 400 milliGRAM(s) Oral every 6 hours  losartan 100 milliGRAM(s) Oral daily  pantoprazole    Tablet 40 milliGRAM(s) Oral before breakfast    MEDICATIONS  (PRN):  acetaminophen   Tablet .. 975 milliGRAM(s) Oral every 6 hours PRN Mild Pain (1 - 3)  oxyCODONE    IR 5 milliGRAM(s) Oral every 4 hours PRN Moderate Pain (4 - 6)  oxyCODONE    IR 10 milliGRAM(s) Oral every 4 hours PRN Severe Pain (7 - 10)

## 2020-11-18 NOTE — PROGRESS NOTE ADULT - PROBLEM SELECTOR PLAN 1
-Appreciate excellent care as per primary surgery team  -Diet per Surgery  -Minimal vaginal bleeding on exam, will continue to monitor with pad counts  -No acute GYN concerns at this point.   -GYN will continue to follow    RFrankel PGY2

## 2020-11-19 LAB
ANION GAP SERPL CALC-SCNC: 9 MMOL/L — SIGNIFICANT CHANGE UP (ref 5–17)
BUN SERPL-MCNC: 7 MG/DL — SIGNIFICANT CHANGE UP (ref 7–23)
CALCIUM SERPL-MCNC: 8.9 MG/DL — SIGNIFICANT CHANGE UP (ref 8.4–10.5)
CHLORIDE SERPL-SCNC: 101 MMOL/L — SIGNIFICANT CHANGE UP (ref 96–108)
CO2 SERPL-SCNC: 26 MMOL/L — SIGNIFICANT CHANGE UP (ref 22–31)
CREAT SERPL-MCNC: 0.54 MG/DL — SIGNIFICANT CHANGE UP (ref 0.5–1.3)
GLUCOSE SERPL-MCNC: 112 MG/DL — HIGH (ref 70–99)
HCT VFR BLD CALC: 26.5 % — LOW (ref 34.5–45)
HGB BLD-MCNC: 8.1 G/DL — LOW (ref 11.5–15.5)
MAGNESIUM SERPL-MCNC: 1.8 MG/DL — SIGNIFICANT CHANGE UP (ref 1.6–2.6)
MCHC RBC-ENTMCNC: 18.2 PG — LOW (ref 27–34)
MCHC RBC-ENTMCNC: 30.6 GM/DL — LOW (ref 32–36)
MCV RBC AUTO: 59.6 FL — LOW (ref 80–100)
NRBC # BLD: 0 /100 WBCS — SIGNIFICANT CHANGE UP (ref 0–0)
PHOSPHATE SERPL-MCNC: 4 MG/DL — SIGNIFICANT CHANGE UP (ref 2.5–4.5)
PLATELET # BLD AUTO: 565 K/UL — HIGH (ref 150–400)
POTASSIUM SERPL-MCNC: 4.2 MMOL/L — SIGNIFICANT CHANGE UP (ref 3.5–5.3)
POTASSIUM SERPL-SCNC: 4.2 MMOL/L — SIGNIFICANT CHANGE UP (ref 3.5–5.3)
RBC # BLD: 4.45 M/UL — SIGNIFICANT CHANGE UP (ref 3.8–5.2)
RBC # FLD: 19 % — HIGH (ref 10.3–14.5)
SODIUM SERPL-SCNC: 136 MMOL/L — SIGNIFICANT CHANGE UP (ref 135–145)
WBC # BLD: 9.57 K/UL — SIGNIFICANT CHANGE UP (ref 3.8–10.5)
WBC # FLD AUTO: 9.57 K/UL — SIGNIFICANT CHANGE UP (ref 3.8–10.5)

## 2020-11-19 PROCEDURE — 74177 CT ABD & PELVIS W/CONTRAST: CPT | Mod: 26

## 2020-11-19 RX ORDER — DEXTROSE MONOHYDRATE, SODIUM CHLORIDE, AND POTASSIUM CHLORIDE 50; .745; 4.5 G/1000ML; G/1000ML; G/1000ML
1000 INJECTION, SOLUTION INTRAVENOUS
Refills: 0 | Status: DISCONTINUED | OUTPATIENT
Start: 2020-11-19 | End: 2020-11-22

## 2020-11-19 RX ORDER — DIPHENHYDRAMINE HCL 50 MG
50 CAPSULE ORAL ONCE
Refills: 0 | Status: COMPLETED | OUTPATIENT
Start: 2020-11-19 | End: 2020-11-19

## 2020-11-19 RX ORDER — ONDANSETRON 8 MG/1
4 TABLET, FILM COATED ORAL ONCE
Refills: 0 | Status: COMPLETED | OUTPATIENT
Start: 2020-11-19 | End: 2020-11-19

## 2020-11-19 RX ADMIN — Medication 1 TABLET(S): at 21:14

## 2020-11-19 RX ADMIN — ENOXAPARIN SODIUM 40 MILLIGRAM(S): 100 INJECTION SUBCUTANEOUS at 17:51

## 2020-11-19 RX ADMIN — Medication 1 TABLET(S): at 10:28

## 2020-11-19 RX ADMIN — PANTOPRAZOLE SODIUM 40 MILLIGRAM(S): 20 TABLET, DELAYED RELEASE ORAL at 10:29

## 2020-11-19 RX ADMIN — Medication 400 MILLIGRAM(S): at 17:51

## 2020-11-19 RX ADMIN — CARVEDILOL PHOSPHATE 6.25 MILLIGRAM(S): 80 CAPSULE, EXTENDED RELEASE ORAL at 10:28

## 2020-11-19 RX ADMIN — Medication 400 MILLIGRAM(S): at 18:21

## 2020-11-19 RX ADMIN — Medication 50 MILLIGRAM(S): at 12:40

## 2020-11-19 RX ADMIN — Medication 1 TABLET(S): at 17:51

## 2020-11-19 RX ADMIN — Medication 40 MILLIGRAM(S): at 10:19

## 2020-11-19 RX ADMIN — ONDANSETRON 4 MILLIGRAM(S): 8 TABLET, FILM COATED ORAL at 06:34

## 2020-11-19 RX ADMIN — LOSARTAN POTASSIUM 100 MILLIGRAM(S): 100 TABLET, FILM COATED ORAL at 10:28

## 2020-11-19 NOTE — CHART NOTE - NSCHARTNOTEFT_GEN_A_CORE
Nutrition Follow Up Note  Patient seen for: Nutrition Follow up for inadequate diet order x 8 days  Chart reviewed, events noted. Per chart, "71 y/o female POD#10 s/p LAR with diverting loop ileostomy, R salpingoophorectomy, D&C, post op course c/b ileus. Pt advanced to clear liquids yesterday, which she tolerated well, however had 2 episodes of emesis overnight, plan per surgery is for abdominal xray today to rule out obstruction."    Source: patient, medical record    Diet: NPO:   Except Medications  With Hard Candy  With Ice Chips/Sips of Water (11-19-20 @ 06:44) [Active]    Patient reports: She had been tolerating clear liquids on 11/17, was drinking some broth on trays, however reports when diet was advanced to full liquids yesterday, 11/18 she consumed some creamier chicken soup and crackers and had emesis last night.  Pt also reports she feels like she was having reflux as well; noted pt continues to receive protonix.  Noted pt went down for abdominal scan today to rule out obstruction; waiting on results at this time.  Pt reports not currently having any nausea or vomiting at this time.     PO intake: Inadequate at this time, pt NPO/clear liquids x 8 days     Source for PO intake: pt reports, medical record    No new weights to assess at this time; will continue to monitor    Pertinent Medications: MEDICATIONS  (STANDING):  calcium carbonate    500 mG (Tums) Chewable 1 Tablet(s) Chew three times a day  carvedilol 6.25 milliGRAM(s) Oral every 12 hours  dextrose 5% + sodium chloride 0.9% with potassium chloride 20 mEq/L 1000 milliLiter(s) (50 mL/Hr) IV Continuous <Continuous>  enoxaparin Injectable 40 milliGRAM(s) SubCutaneous daily  ibuprofen  Tablet. 400 milliGRAM(s) Oral every 6 hours  losartan 100 milliGRAM(s) Oral daily  pantoprazole    Tablet 40 milliGRAM(s) Oral before breakfast    MEDICATIONS  (PRN):  acetaminophen   Tablet .. 975 milliGRAM(s) Oral every 6 hours PRN Mild Pain (1 - 3)  oxyCODONE    IR 5 milliGRAM(s) Oral every 4 hours PRN Moderate Pain (4 - 6)  oxyCODONE    IR 10 milliGRAM(s) Oral every 4 hours PRN Severe Pain (7 - 10)    Pertinent Labs: 11-19 @ 09:12: Na 136, BUN 7, Cr 0.54, <H>, K+ 4.2, Phos 4.0, Mg 1.8    Finger Sticks: none at this time    Skin per nursing documentation: no pressure injuries per flow sheets  Edema: 1+ right foot; left foot per flow sheets    Estimated Needs:   [x] no change since previous assessment    Previous Nutrition Diagnosis: 1. Food & nutrition-related knowledge deficit; nutrition diagnosis is ongoing, being addressed with diet education reinforcement as appropriate /needed  2. Inadequate protein-energy intake, advanced to moderate malnutrition below; nutrition diagnosis is ongoing, pt continues to have inadequate diet intake secondary to altered GI function    New Nutrition Diagnosis: Moderate Malnutrition in setting of acute illness  Related to: altered GI function   As evidenced by: pt meeting <75% of nutrition needs >7 days, 1+ edema per flow sheets    Interventions/ Recommendations:  1) Continue current NPO status and advance diet as medically appropriate per team  2) If clear liquids is reinitiated, consider adding ensure clear 2x/day to augment current PO intake and ultimately low fiber as medically appropriate   3) Reinforce low fiber diet education as needed/appropriate  4) Malnutrition notification placed in chart   5) Consider alternate means of nutrition if PO diet is not feasible at this time    Monitoring and Evaluation:   Continue to monitor Nutritional intake, Tolerance to diet prescription, weights, labs, skin integrity    RD remains available upon request and will follow up per protocol  Pina Roldan MS, RD, CDN pgr #[phone] Nutrition Follow Up Note  Patient seen for: Nutrition Follow up for inadequate diet order x 8 days  Chart reviewed, events noted. Per chart, "71 y/o female POD#10 s/p LAR with diverting loop ileostomy, R salpingoophorectomy, D&C, post op course c/b ileus. Pt advanced to clear liquids yesterday, which she tolerated well, however had 2 episodes of emesis overnight, plan per surgery is for abdominal xray today to rule out obstruction."    Source: patient, medical record    Diet: NPO:   Except Medications  With Hard Candy  With Ice Chips/Sips of Water (11-19-20 @ 06:44) [Active]    Patient reports: She had been tolerating clear liquids on 11/17, was drinking some broth on trays, however reports when diet was advanced to full liquids yesterday, 11/18 she consumed some creamier chicken soup and crackers and had emesis last night.  Pt also reports she feels like she was having reflux as well; noted pt continues to receive protonix.  Noted pt went down for abdominal scan today to rule out obstruction; waiting on results at this time.  Pt reports not currently having any nausea or vomiting at this time.     PO intake: Inadequate at this time, pt NPO/clear liquids x 8 days     Source for PO intake: pt reports, medical record    No new weights to assess at this time; will continue to monitor    Pertinent Medications: MEDICATIONS  (STANDING):  calcium carbonate    500 mG (Tums) Chewable 1 Tablet(s) Chew three times a day  carvedilol 6.25 milliGRAM(s) Oral every 12 hours  dextrose 5% + sodium chloride 0.9% with potassium chloride 20 mEq/L 1000 milliLiter(s) (50 mL/Hr) IV Continuous <Continuous>  enoxaparin Injectable 40 milliGRAM(s) SubCutaneous daily  ibuprofen  Tablet. 400 milliGRAM(s) Oral every 6 hours  losartan 100 milliGRAM(s) Oral daily  pantoprazole    Tablet 40 milliGRAM(s) Oral before breakfast    MEDICATIONS  (PRN):  acetaminophen   Tablet .. 975 milliGRAM(s) Oral every 6 hours PRN Mild Pain (1 - 3)  oxyCODONE    IR 5 milliGRAM(s) Oral every 4 hours PRN Moderate Pain (4 - 6)  oxyCODONE    IR 10 milliGRAM(s) Oral every 4 hours PRN Severe Pain (7 - 10)    Pertinent Labs: 11-19 @ 09:12: Na 136, BUN 7, Cr 0.54, <H>, K+ 4.2, Phos 4.0, Mg 1.8    Finger Sticks: none at this time    Skin per nursing documentation: no pressure injuries per flow sheets; midline abdominal surgical incision per flow sheets  Edema: 1+ right foot; left foot per flow sheets    Estimated Needs:   [x] no change since previous assessment    Previous Nutrition Diagnosis: 1. Food & nutrition-related knowledge deficit; nutrition diagnosis is ongoing, being addressed with diet education reinforcement as appropriate /needed  2. Inadequate protein-energy intake, advanced to moderate malnutrition below; nutrition diagnosis is ongoing, pt continues to have inadequate diet intake secondary to altered GI function    New Nutrition Diagnosis: Moderate Malnutrition in setting of acute illness  Related to: altered GI function   As evidenced by: pt meeting <75% of nutrition needs >7 days, 1+ edema per flow sheets    Interventions/ Recommendations:  1) Continue current NPO status and advance diet as medically appropriate per team  2) If clear liquids is reinitiated, consider adding ensure clear 2x/day to augment current PO intake and ultimately low fiber as medically appropriate   3) Reinforce low fiber diet education as needed/appropriate  4) Malnutrition notification placed in chart   5) Consider alternate means of nutrition if PO diet is not feasible at this time    Monitoring and Evaluation:   Continue to monitor Nutritional intake, Tolerance to diet prescription, weights, labs, skin integrity    RD remains available upon request and will follow up per protocol  Pina Roldan MS, RD, CDN pgr #[phone] Nutrition Follow Up Note  Patient seen for: Nutrition Follow up for inadequate diet order x 8 days  Chart reviewed, events noted. Per chart, "71 y/o female POD#10 s/p LAR with diverting loop ileostomy, R salpingoophorectomy, D&C, post op course c/b ileus. Pt advanced to clear liquids yesterday, which she tolerated well, however had 2 episodes of emesis overnight, plan per surgery is for abdominal xray today to rule out obstruction."    Source: patient, medical record    Diet: NPO:   Except Medications  With Hard Candy  With Ice Chips/Sips of Water (11-19-20 @ 06:44) [Active]    Patient reports: She had been tolerating clear liquids on 11/17, was drinking some broth on trays, however reports when diet was advanced to full liquids yesterday, 11/18 she consumed some creamier chicken soup and crackers and had emesis last night.  Pt also reports she feels like she was having reflux as well; noted pt continues to receive protonix.  Noted pt went down for abdominal scan today to rule out obstruction; waiting on results at this time.  Pt reports not currently having any nausea or vomiting at this time.    Ileostomy Output 11/19: 525 ml     PO intake: Inadequate at this time, pt NPO/clear liquids x 8 days     Source for PO intake: pt reports, medical record    No new weights to assess at this time; will continue to monitor    Pertinent Medications: MEDICATIONS  (STANDING):  calcium carbonate    500 mG (Tums) Chewable 1 Tablet(s) Chew three times a day  carvedilol 6.25 milliGRAM(s) Oral every 12 hours  dextrose 5% + sodium chloride 0.9% with potassium chloride 20 mEq/L 1000 milliLiter(s) (50 mL/Hr) IV Continuous <Continuous>  enoxaparin Injectable 40 milliGRAM(s) SubCutaneous daily  ibuprofen  Tablet. 400 milliGRAM(s) Oral every 6 hours  losartan 100 milliGRAM(s) Oral daily  pantoprazole    Tablet 40 milliGRAM(s) Oral before breakfast    MEDICATIONS  (PRN):  acetaminophen   Tablet .. 975 milliGRAM(s) Oral every 6 hours PRN Mild Pain (1 - 3)  oxyCODONE    IR 5 milliGRAM(s) Oral every 4 hours PRN Moderate Pain (4 - 6)  oxyCODONE    IR 10 milliGRAM(s) Oral every 4 hours PRN Severe Pain (7 - 10)    Pertinent Labs: 11-19 @ 09:12: Na 136, BUN 7, Cr 0.54, <H>, K+ 4.2, Phos 4.0, Mg 1.8    Finger Sticks: none at this time    Skin per nursing documentation: no pressure injuries per flow sheets; midline abdominal surgical incision per flow sheets  Edema: 1+ right foot; left foot per flow sheets    Estimated Needs:   [x] no change since previous assessment    Previous Nutrition Diagnosis: 1. Food & nutrition-related knowledge deficit; nutrition diagnosis is ongoing, being addressed with diet education reinforcement as appropriate /needed  2. Inadequate protein-energy intake, advanced to moderate malnutrition below; nutrition diagnosis is ongoing, pt continues to have inadequate diet intake secondary to altered GI function    New Nutrition Diagnosis: Moderate Malnutrition in setting of acute illness  Related to: altered GI function   As evidenced by: pt meeting <75% of nutrition needs >7 days, 1+ edema per flow sheets    Interventions/ Recommendations:  1) Continue current NPO status and advance diet as medically appropriate per team  2) If clear liquids is reinitiated, consider adding ensure clear 2x/day to augment current PO intake and ultimately low fiber as medically appropriate   3) Reinforce low fiber diet education as needed/appropriate  4) Malnutrition notification placed in chart   5) Consider alternate means of nutrition if PO diet is not feasible at this time    Monitoring and Evaluation:   Continue to monitor Nutritional intake, Tolerance to diet prescription, weights, labs, skin integrity    RD remains available upon request and will follow up per protocol  Pina Roldan, MS, RD, CDN pgr #[phone]

## 2020-11-19 NOTE — PROGRESS NOTE ADULT - PROBLEM SELECTOR PLAN 1
-Appreciate excellent care as per primary surgery team  -Follow up Abdominal Xray  -Diet per Surgery  -Minimal vaginal bleeding on exam, will continue to monitor with pad counts  -No acute GYN concerns at this point.   -GYN will continue to follow    RFrankel PGY2

## 2020-11-19 NOTE — PROVIDER CONTACT NOTE (OTHER) - ACTION/TREATMENT ORDERED:
Will repeat BP manually
0.5mg Dilaudid IVP for pain
Keep patient NPO. Will up to see patient.
MD Gaby Garland notified, as per MD no interventions needed at this time. will continue to monitor.
MD Garcia notified and made aware. Administer 0600 b/p meds as ordered. Will continue to monitor.
MD was made aware. MD gave approval  to move 0600 meds to 0800 due to pt still feels nauseous.
provider assessed at bedside. Zofran ordered and given

## 2020-11-19 NOTE — PROGRESS NOTE ADULT - ASSESSMENT
70y female POD#8 s/p LAR with diverting loop ileostomy, R salpingoophorectomy, D&C, post op course c/b ileus.      - Diet: Placed on NPO with sips and chips this AM.  - CT abd/pelvis ordered this AM. Will follow up  - Will continue to monitor ostomy output closely    - Continue with PO protonix ordered daily  - Venodynes, Lovenox for DVT ppx   - OOB, ambulation   - Will f/u AM labs  - PT: d/c home with no skilled needs  - No acute GYN concerns, as per GYN    [phone]

## 2020-11-19 NOTE — PROGRESS NOTE ADULT - SUBJECTIVE AND OBJECTIVE BOX
R2  GYN  Progress Note    POD#9   HD#10    Patient seen and examined at bedside. Patient had an episode of emesis yesterday afternoon and once early this morning. She states she now feels better with improved ostomy output. She was previously on clears but now on sips and chips only.  Patient is ambulating.  Patient is passing flatus.    [place] is still in place.    Denies CP, SOB, N/V, fevers, and chills.    Vital Signs Last 24 Hours  T(C): 36.8 (11-19-20 @ 05:08), Max: 37.1 (11-18-20 @ 12:15)  HR: 72 (11-19-20 @ 05:08) (70 - 79)  BP: 143/81 (11-19-20 @ 05:08) (135/59 - 149/80)  RR: 18 (11-19-20 @ 05:08) (18 - 18)  SpO2: 95% (11-19-20 @ 05:08) (95% - 98%)    I&O's Summary    17 Nov 2020 07:01  -  18 Nov 2020 07:00  --------------------------------------------------------  IN: 2380 mL / OUT: 2320 mL / NET: 60 mL    18 Nov 2020 07:01  -  19 Nov 2020 06:37  --------------------------------------------------------  IN: 1950 mL / OUT: 2350 mL / NET: -400 mL        Physical Exam:  General: NAD  CV: RR, S1, S2, no M/R/G  Lungs: CTA b/l, good air flow b/l   Abdomen: Soft, appropriately-tender, mildly distended, tympanic, normoactive bowel sounds  Ostomy: in place +brown liquid output  Incision: CDI with bandage in place  : minimal brown spotting on pad  Ext: No pain or swelling       Labs:                        8.7    7.26  )-----------( 567      ( 18 Nov 2020 09:14 )             28.5   baso x      eos x      imm gran x      lymph x      mono x      poly x                            8.2    7.90  )-----------( 556      ( 17 Nov 2020 07:06 )             25.9   baso x      eos x      imm gran x      lymph x      mono x      poly x                            9.2    8.20  )-----------( 600      ( 16 Nov 2020 06:41 )             30.3   baso x      eos x      imm gran x      lymph x      mono x      poly x          MEDICATIONS  (STANDING):  calcium carbonate    500 mG (Tums) Chewable 1 Tablet(s) Chew three times a day  carvedilol 6.25 milliGRAM(s) Oral every 12 hours  dextrose 5% + sodium chloride 0.9% with potassium chloride 20 mEq/L 1000 milliLiter(s) (50 mL/Hr) IV Continuous <Continuous>  enoxaparin Injectable 40 milliGRAM(s) SubCutaneous daily  ibuprofen  Tablet. 400 milliGRAM(s) Oral every 6 hours  losartan 100 milliGRAM(s) Oral daily  pantoprazole    Tablet 40 milliGRAM(s) Oral before breakfast    MEDICATIONS  (PRN):  acetaminophen   Tablet .. 975 milliGRAM(s) Oral every 6 hours PRN Mild Pain (1 - 3)  oxyCODONE    IR 5 milliGRAM(s) Oral every 4 hours PRN Moderate Pain (4 - 6)  oxyCODONE    IR 10 milliGRAM(s) Oral every 4 hours PRN Severe Pain (7 - 10)

## 2020-11-19 NOTE — PROVIDER CONTACT NOTE (OTHER) - BACKGROUND
Pt stated to feel anxious
LAR; Loop ileostomy
Pt admit 11/11 w/ Divert w/o perf, OR-LAR, w/ b/l u-caths, Right ovary and right fallopian tube removed. ileostomy creation.
Pt had a LAR and a creation of loop ileostomy
Pt s/p LAR w/ B/L U-cath, ileostomy creation and removal of R ovary and R fallopian tube
Pt s/p abd sx on 11/11 currently on PCA pump
s/p Right ovary and right fallopian tube removed. ileostomy creation.

## 2020-11-19 NOTE — PROVIDER CONTACT NOTE (OTHER) - ASSESSMENT
pt felt nauseous at 0500. Pt vomited 250 cc. Pt stated she felt a little better but still feels nauseous.

## 2020-11-19 NOTE — PROGRESS NOTE ADULT - SUBJECTIVE AND OBJECTIVE BOX
SURGERY DAILY PROGRESS NOTE:     24H events:   Was placed on CLD yesterday. Noted vomiting x2, once yesterday evening and once this AM.    SUBJECTIVE/ROS: Patient reports despite vomiting, feels better. Reports she has ambulating yesterday.  Denies nausea, vomiting, chest pain, shortness of breath       MEDICATIONS  (STANDING):  calcium carbonate    500 mG (Tums) Chewable 1 Tablet(s) Chew three times a day  carvedilol 6.25 milliGRAM(s) Oral every 12 hours  dextrose 5% + sodium chloride 0.9% with potassium chloride 20 mEq/L 1000 milliLiter(s) (50 mL/Hr) IV Continuous <Continuous>  enoxaparin Injectable 40 milliGRAM(s) SubCutaneous daily  ibuprofen  Tablet. 400 milliGRAM(s) Oral every 6 hours  losartan 100 milliGRAM(s) Oral daily  pantoprazole    Tablet 40 milliGRAM(s) Oral before breakfast    MEDICATIONS  (PRN):  acetaminophen   Tablet .. 975 milliGRAM(s) Oral every 6 hours PRN Mild Pain (1 - 3)  oxyCODONE    IR 5 milliGRAM(s) Oral every 4 hours PRN Moderate Pain (4 - 6)  oxyCODONE    IR 10 milliGRAM(s) Oral every 4 hours PRN Severe Pain (7 - 10)      OBJECTIVE:    Vital Signs Last 24 Hrs  T(C): 36.8 (19 Nov 2020 05:08), Max: 37.1 (18 Nov 2020 12:15)  T(F): 98.3 (19 Nov 2020 05:08), Max: 98.7 (18 Nov 2020 12:15)  HR: 72 (19 Nov 2020 05:08) (70 - 79)  BP: 143/81 (19 Nov 2020 05:08) (135/59 - 149/80)  BP(mean): --  RR: 18 (19 Nov 2020 05:08) (18 - 18)  SpO2: 95% (19 Nov 2020 05:08) (95% - 98%)        I&O's Detail    18 Nov 2020 07:01  -  19 Nov 2020 07:00  --------------------------------------------------------  IN:    dextrose 5% + sodium chloride 0.9% w/ Additives: 1500 mL    IV PiggyBack: 50 mL    Oral Fluid: 400 mL  Total IN: 1950 mL    OUT:    Emesis (mL): 400 mL    Ileostomy (mL): 800 mL    Voided (mL): 1350 mL  Total OUT: 2550 mL    Total NET: -600 mL          Daily     Daily     LABS:                        8.7    7.26  )-----------( 567      ( 18 Nov 2020 09:14 )             28.5     11-18    134<L>  |  99  |  5<L>  ----------------------------<  105<H>  4.4   |  27  |  0.51    Ca    9.3      18 Nov 2020 09:14  Phos  3.6     11-18  Mg     1.9     11-    PHYSICAL EXAM:   General: NAD, resting comfortably  Respiratory: nonlabored breathing, normal chest wall expansion  Abdominal: Soft, NT, ND, incisions c/d/i, ostomy pink and viable. -/+ w/ liquid output  Vascular: Warm and well perfused  Extremities: No edema

## 2020-11-19 NOTE — PROGRESS NOTE ADULT - ASSESSMENT
A/P: 70y POD#10  s/p s/p LAR with diverting loop ileostomy, R salpingoophorectomy, D&C. Pt advanced to clear liquids yesterday, which she initially tolerated well, however she had 2 episodes of emesis overnight. Per patient, plan per surgery is for abdominal xray today to rule out obstruction.

## 2020-11-19 NOTE — CHART NOTE - TREATMENT: THE FOLLOWING DIET HAS BEEN RECOMMENDED
Diet, NPO:   Except Medications  With Hard Candy  With Ice Chips/Sips of Water (11-19-20 @ 06:44) [Active]

## 2020-11-19 NOTE — PROVIDER CONTACT NOTE (OTHER) - DATE AND TIME:
Will send patient 1 more letter before Dr Romano decides to have patient seek care elsewhere due to not following up with  for her testing and follow-ups.     19-Nov-2020 05:15

## 2020-11-20 LAB
ANION GAP SERPL CALC-SCNC: 11 MMOL/L — SIGNIFICANT CHANGE UP (ref 5–17)
BUN SERPL-MCNC: 10 MG/DL — SIGNIFICANT CHANGE UP (ref 7–23)
CALCIUM SERPL-MCNC: 9.6 MG/DL — SIGNIFICANT CHANGE UP (ref 8.4–10.5)
CHLORIDE SERPL-SCNC: 99 MMOL/L — SIGNIFICANT CHANGE UP (ref 96–108)
CO2 SERPL-SCNC: 26 MMOL/L — SIGNIFICANT CHANGE UP (ref 22–31)
CREAT SERPL-MCNC: 0.55 MG/DL — SIGNIFICANT CHANGE UP (ref 0.5–1.3)
GLUCOSE SERPL-MCNC: 115 MG/DL — HIGH (ref 70–99)
HCT VFR BLD CALC: 27.9 % — LOW (ref 34.5–45)
HGB BLD-MCNC: 8.6 G/DL — LOW (ref 11.5–15.5)
MAGNESIUM SERPL-MCNC: 1.8 MG/DL — SIGNIFICANT CHANGE UP (ref 1.6–2.6)
MCHC RBC-ENTMCNC: 18.5 PG — LOW (ref 27–34)
MCHC RBC-ENTMCNC: 30.8 GM/DL — LOW (ref 32–36)
MCV RBC AUTO: 60.1 FL — LOW (ref 80–100)
NRBC # BLD: 0 /100 WBCS — SIGNIFICANT CHANGE UP (ref 0–0)
PHOSPHATE SERPL-MCNC: 3.7 MG/DL — SIGNIFICANT CHANGE UP (ref 2.5–4.5)
PLATELET # BLD AUTO: 611 K/UL — HIGH (ref 150–400)
POTASSIUM SERPL-MCNC: 4.5 MMOL/L — SIGNIFICANT CHANGE UP (ref 3.5–5.3)
POTASSIUM SERPL-SCNC: 4.5 MMOL/L — SIGNIFICANT CHANGE UP (ref 3.5–5.3)
RBC # BLD: 4.64 M/UL — SIGNIFICANT CHANGE UP (ref 3.8–5.2)
RBC # FLD: 19.5 % — HIGH (ref 10.3–14.5)
SODIUM SERPL-SCNC: 136 MMOL/L — SIGNIFICANT CHANGE UP (ref 135–145)
WBC # BLD: 8.47 K/UL — SIGNIFICANT CHANGE UP (ref 3.8–10.5)
WBC # FLD AUTO: 8.47 K/UL — SIGNIFICANT CHANGE UP (ref 3.8–10.5)

## 2020-11-20 RX ORDER — FLUCONAZOLE 150 MG/1
1 TABLET ORAL
Qty: 0 | Refills: 0 | DISCHARGE

## 2020-11-20 RX ORDER — ACETAMINOPHEN 500 MG
3 TABLET ORAL
Qty: 0 | Refills: 0 | DISCHARGE
Start: 2020-11-20

## 2020-11-20 RX ORDER — IBUPROFEN 200 MG
1 TABLET ORAL
Qty: 0 | Refills: 0 | DISCHARGE
Start: 2020-11-20

## 2020-11-20 RX ORDER — MAGNESIUM SULFATE 500 MG/ML
2 VIAL (ML) INJECTION ONCE
Refills: 0 | Status: COMPLETED | OUTPATIENT
Start: 2020-11-20 | End: 2020-11-20

## 2020-11-20 RX ADMIN — Medication 400 MILLIGRAM(S): at 16:43

## 2020-11-20 RX ADMIN — Medication 1 TABLET(S): at 05:36

## 2020-11-20 RX ADMIN — CARVEDILOL PHOSPHATE 6.25 MILLIGRAM(S): 80 CAPSULE, EXTENDED RELEASE ORAL at 08:39

## 2020-11-20 RX ADMIN — Medication 400 MILLIGRAM(S): at 11:00

## 2020-11-20 RX ADMIN — Medication 400 MILLIGRAM(S): at 21:08

## 2020-11-20 RX ADMIN — Medication 1 TABLET(S): at 21:08

## 2020-11-20 RX ADMIN — Medication 400 MILLIGRAM(S): at 10:49

## 2020-11-20 RX ADMIN — Medication 400 MILLIGRAM(S): at 17:00

## 2020-11-20 RX ADMIN — DEXTROSE MONOHYDRATE, SODIUM CHLORIDE, AND POTASSIUM CHLORIDE 75 MILLILITER(S): 50; .745; 4.5 INJECTION, SOLUTION INTRAVENOUS at 16:42

## 2020-11-20 RX ADMIN — Medication 400 MILLIGRAM(S): at 21:38

## 2020-11-20 RX ADMIN — CARVEDILOL PHOSPHATE 6.25 MILLIGRAM(S): 80 CAPSULE, EXTENDED RELEASE ORAL at 21:08

## 2020-11-20 RX ADMIN — Medication 400 MILLIGRAM(S): at 06:06

## 2020-11-20 RX ADMIN — ENOXAPARIN SODIUM 40 MILLIGRAM(S): 100 INJECTION SUBCUTANEOUS at 14:42

## 2020-11-20 RX ADMIN — PANTOPRAZOLE SODIUM 40 MILLIGRAM(S): 20 TABLET, DELAYED RELEASE ORAL at 05:36

## 2020-11-20 RX ADMIN — DEXTROSE MONOHYDRATE, SODIUM CHLORIDE, AND POTASSIUM CHLORIDE 100 MILLILITER(S): 50; .745; 4.5 INJECTION, SOLUTION INTRAVENOUS at 04:28

## 2020-11-20 RX ADMIN — Medication 400 MILLIGRAM(S): at 05:36

## 2020-11-20 RX ADMIN — LOSARTAN POTASSIUM 100 MILLIGRAM(S): 100 TABLET, FILM COATED ORAL at 05:36

## 2020-11-20 RX ADMIN — Medication 1 TABLET(S): at 14:42

## 2020-11-20 RX ADMIN — Medication 50 GRAM(S): at 10:49

## 2020-11-20 NOTE — CHART NOTE - NSCHARTNOTEFT_GEN_A_CORE
Nutrition Follow Up Note  Patient seen for: Malnutrition follow up on 2MON  Chart reviewed, events noted. Per chart, "71 y/o female POD#11 s/p LAR with diverting loop ileostomy, R salpingoophorectomy, D&C, post op course c/b ileus. Pt advanced to clear liquids yesterday, which she tolerated well, however had 2 episodes of emesis overnight, plan per surgery is for abdominal xray today to rule out obstruction." Noted per 11/20 surgery note, CT yesterday showed no obvious abnormalities and diet readvanced to clear liquids this morning, 11/20.    Source: patient, medical record     Diet: Clear Liquid:   Supplement Feeding Modality:  Oral  Ensure Clear Cans or Servings Per Day:  2       Frequency:  Daily (11-20-20 @ 08:08) [Active]    Patient reports: No GI distress at this time. No emesis today and no nausea. Pt receiving Protonix for reflux but has not had any today.     PO intake: <25% of clears; reports she had 1/2 of Italian ice and a few sips of soup.  Reports her  brought her in collagen protein for her to drink. RD explained importance of trying to consume oral nutrition supplements for protein intake at this time; pt receptive and reports she will try to drink the supplements.     Source for PO intake: pt reports    No new weights to assess; will continue to monitor    Pertinent Medications: MEDICATIONS  (STANDING):  calcium carbonate    500 mG (Tums) Chewable 1 Tablet(s) Chew three times a day  carvedilol 6.25 milliGRAM(s) Oral every 12 hours  dextrose 5% + sodium chloride 0.9% with potassium chloride 20 mEq/L 1000 milliLiter(s) (75 mL/Hr) IV Continuous <Continuous>  enoxaparin Injectable 40 milliGRAM(s) SubCutaneous daily  ibuprofen  Tablet. 400 milliGRAM(s) Oral every 6 hours  losartan 100 milliGRAM(s) Oral daily  pantoprazole    Tablet 40 milliGRAM(s) Oral before breakfast    MEDICATIONS  (PRN):  acetaminophen   Tablet .. 975 milliGRAM(s) Oral every 6 hours PRN Mild Pain (1 - 3)  oxyCODONE    IR 5 milliGRAM(s) Oral every 4 hours PRN Moderate Pain (4 - 6)  oxyCODONE    IR 10 milliGRAM(s) Oral every 4 hours PRN Severe Pain (7 - 10)    Pertinent Labs: 11-20 @ 07:04: Na 136, BUN 10, Cr 0.55, <H>, K+ 4.5, Phos 3.7, Mg 1.8  Electrolytes within normal limits at this time    Finger Sticks: none at this time    Skin per nursing documentation: no pressure injuries per flow sheets  Edema: 1+ left foot; right foot per flow sheets    Estimated Needs:   [x] no change since previous assessment    Previous Nutrition Diagnosis: 1.Moderate Malnutrition in setting of acute illness is ongoing, being addressed with PO diet advancement, addition of ensure clear to augment intake 2. Food & nutrition-related knowledge deficit; nutrition diagnosis is ongoing, being addressed with diet education reinforcement as appropriate /needed    New Nutrition Diagnosis: none at this time    Interventions/ Recommend  1) Continue current clear liquids diet as tolerated  2) Continue with ensure clear 2x/day to augment current PO intake and recommend to ultimately advance to low fiber as medically appropriate   3) Reinforce low fiber diet education as needed/appropriate    Monitoring and Evaluation:   Continue to monitor Nutritional intake, Tolerance to diet prescription, weights, labs, skin integrity    RD remains available upon request and will follow up per protocol  Pina Roldan, MS, RD, CDN pgr #[phone] Nutrition Follow Up Note  Patient seen for: Malnutrition follow up on 2MON  Chart reviewed, events noted. Per chart, "69 y/o female POD#11 s/p LAR with diverting loop ileostomy, R salpingoophorectomy, D&C, post op course c/b ileus. Pt advanced to clear liquids yesterday, which she tolerated well, however had 2 episodes of emesis overnight, plan per surgery is for abdominal xray today to rule out obstruction." Noted per 11/20 surgery note, CT yesterday showed no obvious abnormalities and diet readvanced to clear liquids this morning, 11/20.    Source: patient, medical record     Diet: Clear Liquid:   Supplement Feeding Modality:  Oral  Ensure Clear Cans or Servings Per Day:  2       Frequency:  Daily (11-20-20 @ 08:08) [Active]    Patient reports: No GI distress at this time. No emesis today and no nausea. Pt receiving Protonix for reflux but has not had any today.     PO intake: <25% of clears; reports she had 1/2 of Italian ice and a few sips of soup.  Reports her  brought her in collagen protein for her to drink. RD explained importance of trying to consume oral nutrition supplements for protein intake at this time; pt receptive and reports she will try to drink the supplements.    Ileostomy output: 275 ml (11/20), 1 L (11/19)     Source for PO intake: pt reports    No new weights to assess; will continue to monitor    Pertinent Medications: MEDICATIONS  (STANDING):  calcium carbonate    500 mG (Tums) Chewable 1 Tablet(s) Chew three times a day  carvedilol 6.25 milliGRAM(s) Oral every 12 hours  dextrose 5% + sodium chloride 0.9% with potassium chloride 20 mEq/L 1000 milliLiter(s) (75 mL/Hr) IV Continuous <Continuous>  enoxaparin Injectable 40 milliGRAM(s) SubCutaneous daily  ibuprofen  Tablet. 400 milliGRAM(s) Oral every 6 hours  losartan 100 milliGRAM(s) Oral daily  pantoprazole    Tablet 40 milliGRAM(s) Oral before breakfast    MEDICATIONS  (PRN):  acetaminophen   Tablet .. 975 milliGRAM(s) Oral every 6 hours PRN Mild Pain (1 - 3)  oxyCODONE    IR 5 milliGRAM(s) Oral every 4 hours PRN Moderate Pain (4 - 6)  oxyCODONE    IR 10 milliGRAM(s) Oral every 4 hours PRN Severe Pain (7 - 10)    Pertinent Labs: 11-20 @ 07:04: Na 136, BUN 10, Cr 0.55, <H>, K+ 4.5, Phos 3.7, Mg 1.8  Electrolytes within normal limits at this time    Finger Sticks: none at this time    Skin per nursing documentation: no pressure injuries per flow sheets  Edema: 1+ left foot; right foot per flow sheets    Estimated Needs:   [x] no change since previous assessment    Previous Nutrition Diagnosis: 1.Moderate Malnutrition in setting of acute illness is ongoing, being addressed with PO diet advancement, addition of ensure clear to augment intake 2. Food & nutrition-related knowledge deficit; nutrition diagnosis is ongoing, being addressed with diet education reinforcement as appropriate /needed    New Nutrition Diagnosis: none at this time    Interventions/ Recommend  1) Continue current clear liquids diet as tolerated  2) Continue with ensure clear 2x/day to augment current PO intake and recommend to ultimately advance to low fiber as medically appropriate   3) Reinforce low fiber diet education as needed/appropriate    Monitoring and Evaluation:   Continue to monitor Nutritional intake, Tolerance to diet prescription, weights, labs, skin integrity    RD remains available upon request and will follow up per protocol  Pina Roldan, MS, RD, CDN pgr #[phone]

## 2020-11-20 NOTE — PROGRESS NOTE ADULT - NUTRITIONAL ASSESSMENT
This patient has been assessed with a concern for Malnutrition and has been determined to have a diagnosis/diagnoses of Moderate protein-calorie malnutrition.    This patient is being managed with:   Diet Clear Liquid-  Supplement Feeding Modality:  Oral  Ensure Clear Cans or Servings Per Day:  2       Frequency:  Daily  Entered: Nov 20 2020  8:08AM

## 2020-11-20 NOTE — PROGRESS NOTE ADULT - ASSESSMENT
70y female POD#9 s/p LAR with diverting loop ileostomy, R salpingoophorectomy, D&C, post op course c/b ileus.      - Diet: Placed on NPO with sips and chips this AM.  - Will continue to monitor ostomy output closely    - Continue with PO protonix ordered daily  - Venodynes, Lovenox for DVT ppx   - OOB, ambulation   - Will f/u AM labs  - PT: d/c home with no skilled needs  - No acute GYN concerns, as per GYN    [phone]

## 2020-11-20 NOTE — PROGRESS NOTE ADULT - SUBJECTIVE AND OBJECTIVE BOX
Surgery Progress Note    SUBJECTIVE: Pt seen and examined at bedside. Patient comfortable and in no-apparent distress. Ostomy functioning with gas and liquid stool. Pt without nausea or vomiting since yesterday. CT yesterday with no obvious abnormalities.    Vital Signs Last 24 Hrs  T(C): 36.6 (20 Nov 2020 04:29), Max: 37.1 (19 Nov 2020 16:59)  T(F): 97.9 (20 Nov 2020 04:29), Max: 98.8 (19 Nov 2020 16:59)  HR: 74 (20 Nov 2020 08:37) (74 - 88)  BP: 137/74 (20 Nov 2020 08:37) (109/65 - 140/80)  BP(mean): --  RR: 18 (20 Nov 2020 08:37) (18 - 18)  SpO2: 96% (20 Nov 2020 08:37) (95% - 97%)    PHYSICAL EXAM:   General: NAD, resting comfortably  Respiratory: nonlabored breathing, normal chest wall expansion  Abdominal: Soft, NT, ND, incisions c/d/i, ostomy pink and viable. +/+ w/ liquid output  Vascular: Warm and well perfused  Extremities: No edema    LABS:                        8.6    8.47  )-----------( 611      ( 20 Nov 2020 07:05 )             27.9     11-20    136  |  99  |  10  ----------------------------<  115<H>  4.5   |  26  |  0.55    Ca    9.6      20 Nov 2020 07:04  Phos  3.7     11-20  Mg     1.8     11-20            INs and OUTs:    11-19-20 @ 07:01  -  11-20-20 @ 07:00  --------------------------------------------------------  IN: 1800 mL / OUT: 1475 mL / NET: 325 mL    11-20-20 @ 07:01  -  11-20-20 @ 09:34  --------------------------------------------------------  IN: 0 mL / OUT: 350 mL / NET: -350 mL

## 2020-11-21 LAB
ANION GAP SERPL CALC-SCNC: 10 MMOL/L — SIGNIFICANT CHANGE UP (ref 5–17)
BUN SERPL-MCNC: 9 MG/DL — SIGNIFICANT CHANGE UP (ref 7–23)
CALCIUM SERPL-MCNC: 9.2 MG/DL — SIGNIFICANT CHANGE UP (ref 8.4–10.5)
CHLORIDE SERPL-SCNC: 100 MMOL/L — SIGNIFICANT CHANGE UP (ref 96–108)
CO2 SERPL-SCNC: 26 MMOL/L — SIGNIFICANT CHANGE UP (ref 22–31)
CREAT SERPL-MCNC: 0.62 MG/DL — SIGNIFICANT CHANGE UP (ref 0.5–1.3)
GLUCOSE SERPL-MCNC: 90 MG/DL — SIGNIFICANT CHANGE UP (ref 70–99)
HCT VFR BLD CALC: 28 % — LOW (ref 34.5–45)
HGB BLD-MCNC: 8.8 G/DL — LOW (ref 11.5–15.5)
MAGNESIUM SERPL-MCNC: 1.7 MG/DL — SIGNIFICANT CHANGE UP (ref 1.6–2.6)
MCHC RBC-ENTMCNC: 18.6 PG — LOW (ref 27–34)
MCHC RBC-ENTMCNC: 31.4 GM/DL — LOW (ref 32–36)
MCV RBC AUTO: 59.2 FL — LOW (ref 80–100)
NRBC # BLD: 0 /100 WBCS — SIGNIFICANT CHANGE UP (ref 0–0)
PHOSPHATE SERPL-MCNC: 3.5 MG/DL — SIGNIFICANT CHANGE UP (ref 2.5–4.5)
PLATELET # BLD AUTO: 663 K/UL — HIGH (ref 150–400)
POTASSIUM SERPL-MCNC: 4 MMOL/L — SIGNIFICANT CHANGE UP (ref 3.5–5.3)
POTASSIUM SERPL-SCNC: 4 MMOL/L — SIGNIFICANT CHANGE UP (ref 3.5–5.3)
RBC # BLD: 4.73 M/UL — SIGNIFICANT CHANGE UP (ref 3.8–5.2)
RBC # FLD: 19.4 % — HIGH (ref 10.3–14.5)
SODIUM SERPL-SCNC: 136 MMOL/L — SIGNIFICANT CHANGE UP (ref 135–145)
WBC # BLD: 6.89 K/UL — SIGNIFICANT CHANGE UP (ref 3.8–10.5)
WBC # FLD AUTO: 6.89 K/UL — SIGNIFICANT CHANGE UP (ref 3.8–10.5)

## 2020-11-21 RX ORDER — MAGNESIUM SULFATE 500 MG/ML
2 VIAL (ML) INJECTION ONCE
Refills: 0 | Status: COMPLETED | OUTPATIENT
Start: 2020-11-21 | End: 2020-11-21

## 2020-11-21 RX ADMIN — Medication 1 TABLET(S): at 06:11

## 2020-11-21 RX ADMIN — LOSARTAN POTASSIUM 100 MILLIGRAM(S): 100 TABLET, FILM COATED ORAL at 06:11

## 2020-11-21 RX ADMIN — Medication 1 TABLET(S): at 21:02

## 2020-11-21 RX ADMIN — Medication 400 MILLIGRAM(S): at 03:55

## 2020-11-21 RX ADMIN — Medication 1 TABLET(S): at 15:18

## 2020-11-21 RX ADMIN — CARVEDILOL PHOSPHATE 6.25 MILLIGRAM(S): 80 CAPSULE, EXTENDED RELEASE ORAL at 20:06

## 2020-11-21 RX ADMIN — Medication 400 MILLIGRAM(S): at 21:32

## 2020-11-21 RX ADMIN — Medication 400 MILLIGRAM(S): at 21:02

## 2020-11-21 RX ADMIN — Medication 400 MILLIGRAM(S): at 09:17

## 2020-11-21 RX ADMIN — Medication 50 GRAM(S): at 18:18

## 2020-11-21 RX ADMIN — Medication 400 MILLIGRAM(S): at 04:25

## 2020-11-21 RX ADMIN — PANTOPRAZOLE SODIUM 40 MILLIGRAM(S): 20 TABLET, DELAYED RELEASE ORAL at 06:11

## 2020-11-21 RX ADMIN — Medication 400 MILLIGRAM(S): at 09:54

## 2020-11-21 RX ADMIN — CARVEDILOL PHOSPHATE 6.25 MILLIGRAM(S): 80 CAPSULE, EXTENDED RELEASE ORAL at 09:21

## 2020-11-21 RX ADMIN — ENOXAPARIN SODIUM 40 MILLIGRAM(S): 100 INJECTION SUBCUTANEOUS at 11:41

## 2020-11-21 NOTE — PROGRESS NOTE ADULT - NUTRITIONAL ASSESSMENT
This patient has been assessed with a concern for Malnutrition and has been determined to have a diagnosis/diagnoses of Moderate protein-calorie malnutrition.    This patient is being managed with:   Diet Full Liquid-  Supplement Feeding Modality:  Oral  Ensure Enlive Cans or Servings Per Day:  1       Frequency:  Daily  Entered: Nov 21 2020  8:01AM

## 2020-11-21 NOTE — PROGRESS NOTE ADULT - SUBJECTIVE AND OBJECTIVE BOX
Surgery Progress Note    SUBJECTIVE: Pt seen and examined at bedside. Patient comfortable and in no-apparent distress. Tolerating CLD without nausea or vomiting. Ostomy functioning with gas and liquid stool. Pain is controlled.     Vital Signs Last 24 Hrs  T(C): 36.5 (21 Nov 2020 06:08), Max: 36.9 (21 Nov 2020 01:15)  T(F): 97.7 (21 Nov 2020 06:08), Max: 98.5 (21 Nov 2020 01:15)  HR: 64 (21 Nov 2020 06:08) (63 - 74)  BP: 160/70 (21 Nov 2020 06:08) (127/70 - 160/70)  BP(mean): --  RR: 18 (21 Nov 2020 06:08) (18 - 18)  SpO2: 96% (21 Nov 2020 06:08) (96% - 97%)    PHYSICAL EXAM:   General: NAD, resting comfortably  Respiratory: nonlabored breathing, normal chest wall expansion  Abdominal: Soft, NT, ND, incisions c/d/i, ostomy pink and viable. +/+ w/ liquid output  Vascular: Warm and well perfused  Extremities: No edema    LABS:                        8.6    8.47  )-----------( 611      ( 20 Nov 2020 07:05 )             27.9     11-20    136  |  99  |  10  ----------------------------<  115<H>  4.5   |  26  |  0.55    Ca    9.6      20 Nov 2020 07:04  Phos  3.7     11-20  Mg     1.8     11-20            INs and OUTs:    11-20-20 @ 07:01  -  11-21-20 @ 07:00  --------------------------------------------------------  IN: 1100 mL / OUT: 2330 mL / NET: -1230 mL    11-21-20 @ 07:01  -  11-21-20 @ 08:20  --------------------------------------------------------  IN: 0 mL / OUT: 650 mL / NET: -650 mL

## 2020-11-21 NOTE — PROGRESS NOTE ADULT - ASSESSMENT
70y female s/p LAR with diverting loop ileostomy, R salpingoophorectomy, D&C 11/10, post op course c/b ileus.      - Diet: advance to full liquids  - Will continue to monitor ostomy output closely    - Continue with PO protonix ordered daily  - Venodynes, Lovenox for DVT ppx   - OOB, ambulation   - Will f/u AM labs  - PT: d/c home with no skilled needs  - No acute GYN concerns, as per GYN    Red Team Surgery [phone]

## 2020-11-22 LAB
ANION GAP SERPL CALC-SCNC: 10 MMOL/L — SIGNIFICANT CHANGE UP (ref 5–17)
BUN SERPL-MCNC: 8 MG/DL — SIGNIFICANT CHANGE UP (ref 7–23)
CALCIUM SERPL-MCNC: 9.2 MG/DL — SIGNIFICANT CHANGE UP (ref 8.4–10.5)
CHLORIDE SERPL-SCNC: 101 MMOL/L — SIGNIFICANT CHANGE UP (ref 96–108)
CO2 SERPL-SCNC: 27 MMOL/L — SIGNIFICANT CHANGE UP (ref 22–31)
CREAT SERPL-MCNC: 0.63 MG/DL — SIGNIFICANT CHANGE UP (ref 0.5–1.3)
GLUCOSE SERPL-MCNC: 84 MG/DL — SIGNIFICANT CHANGE UP (ref 70–99)
HCT VFR BLD CALC: 27.8 % — LOW (ref 34.5–45)
HGB BLD-MCNC: 8.5 G/DL — LOW (ref 11.5–15.5)
MAGNESIUM SERPL-MCNC: 1.7 MG/DL — SIGNIFICANT CHANGE UP (ref 1.6–2.6)
MCHC RBC-ENTMCNC: 18.4 PG — LOW (ref 27–34)
MCHC RBC-ENTMCNC: 30.6 GM/DL — LOW (ref 32–36)
MCV RBC AUTO: 60.2 FL — LOW (ref 80–100)
NRBC # BLD: 0 /100 WBCS — SIGNIFICANT CHANGE UP (ref 0–0)
PHOSPHATE SERPL-MCNC: 4.4 MG/DL — SIGNIFICANT CHANGE UP (ref 2.5–4.5)
PLATELET # BLD AUTO: 596 K/UL — HIGH (ref 150–400)
POTASSIUM SERPL-MCNC: 3.9 MMOL/L — SIGNIFICANT CHANGE UP (ref 3.5–5.3)
POTASSIUM SERPL-SCNC: 3.9 MMOL/L — SIGNIFICANT CHANGE UP (ref 3.5–5.3)
RBC # BLD: 4.62 M/UL — SIGNIFICANT CHANGE UP (ref 3.8–5.2)
RBC # FLD: 19.2 % — HIGH (ref 10.3–14.5)
SODIUM SERPL-SCNC: 138 MMOL/L — SIGNIFICANT CHANGE UP (ref 135–145)
WBC # BLD: 6.1 K/UL — SIGNIFICANT CHANGE UP (ref 3.8–10.5)
WBC # FLD AUTO: 6.1 K/UL — SIGNIFICANT CHANGE UP (ref 3.8–10.5)

## 2020-11-22 RX ORDER — MAGNESIUM SULFATE 500 MG/ML
2 VIAL (ML) INJECTION ONCE
Refills: 0 | Status: COMPLETED | OUTPATIENT
Start: 2020-11-22 | End: 2020-11-22

## 2020-11-22 RX ADMIN — PANTOPRAZOLE SODIUM 40 MILLIGRAM(S): 20 TABLET, DELAYED RELEASE ORAL at 05:24

## 2020-11-22 RX ADMIN — Medication 400 MILLIGRAM(S): at 21:20

## 2020-11-22 RX ADMIN — Medication 50 GRAM(S): at 10:33

## 2020-11-22 RX ADMIN — Medication 400 MILLIGRAM(S): at 15:45

## 2020-11-22 RX ADMIN — Medication 400 MILLIGRAM(S): at 15:15

## 2020-11-22 RX ADMIN — Medication 1 TABLET(S): at 21:20

## 2020-11-22 RX ADMIN — CARVEDILOL PHOSPHATE 6.25 MILLIGRAM(S): 80 CAPSULE, EXTENDED RELEASE ORAL at 21:20

## 2020-11-22 RX ADMIN — Medication 400 MILLIGRAM(S): at 02:15

## 2020-11-22 RX ADMIN — CARVEDILOL PHOSPHATE 6.25 MILLIGRAM(S): 80 CAPSULE, EXTENDED RELEASE ORAL at 10:34

## 2020-11-22 RX ADMIN — Medication 400 MILLIGRAM(S): at 21:50

## 2020-11-22 RX ADMIN — Medication 1 TABLET(S): at 05:24

## 2020-11-22 RX ADMIN — Medication 400 MILLIGRAM(S): at 11:00

## 2020-11-22 RX ADMIN — Medication 400 MILLIGRAM(S): at 10:35

## 2020-11-22 RX ADMIN — ENOXAPARIN SODIUM 40 MILLIGRAM(S): 100 INJECTION SUBCUTANEOUS at 10:42

## 2020-11-22 RX ADMIN — Medication 400 MILLIGRAM(S): at 01:44

## 2020-11-22 RX ADMIN — LOSARTAN POTASSIUM 100 MILLIGRAM(S): 100 TABLET, FILM COATED ORAL at 05:24

## 2020-11-22 RX ADMIN — Medication 1 TABLET(S): at 15:15

## 2020-11-22 NOTE — PROGRESS NOTE ADULT - NUTRITIONAL ASSESSMENT
This patient has been assessed with a concern for Malnutrition and has been determined to have a diagnosis/diagnoses of Moderate protein-calorie malnutrition.    This patient is being managed with:   Diet Regular-  Fiber/Residue Restricted (LOWFIBER)  Supplement Feeding Modality:  Oral  Ensure Enlive Cans or Servings Per Day:  1       Frequency:  Daily  Entered: Nov 22 2020  9:00AM

## 2020-11-22 NOTE — PROGRESS NOTE ADULT - SUBJECTIVE AND OBJECTIVE BOX
Surgery Progress Note    SUBJECTIVE:  Pt seen and examined at bedside. Patient comfortable and in no-apparent distress. Tolerating CLD without nausea or vomiting and will progress to LRD today. Ostomy functioning with gas and liquid stool. Pain is controlled.     Vital Signs Last 24 Hrs  T(C): 36.9 (22 Nov 2020 09:20), Max: 36.9 (21 Nov 2020 13:10)  T(F): 98.4 (22 Nov 2020 09:20), Max: 98.4 (21 Nov 2020 13:10)  HR: 64 (22 Nov 2020 09:20) (62 - 72)  BP: 148/77 (22 Nov 2020 05:26) (110/71 - 156/70)  BP(mean): --  RR: 18 (22 Nov 2020 09:20) (18 - 18)  SpO2: 96% (22 Nov 2020 09:20) (95% - 98%)    PHYSICAL EXAM:   General: NAD, resting comfortably  Respiratory: nonlabored breathing, normal chest wall expansion  Abdominal: Soft, NT, ND, incisions c/d/i, ostomy pink and viable. +/+ w/ liquid output  Vascular: Warm and well perfused  Extremities: No edema    LABS:                        8.5    6.10  )-----------( 596      ( 22 Nov 2020 09:07 )             27.8     11-22    138  |  101  |  8   ----------------------------<  84  3.9   |  27  |  0.63    Ca    9.2      22 Nov 2020 09:07  Phos  4.4     11-22  Mg     1.7     11-22            INs and OUTs:    11-21-20 @ 07:01  -  11-22-20 @ 07:00  --------------------------------------------------------  IN: 1520 mL / OUT: 3650 mL / NET: -2130 mL        Medications:  MEDICATIONS  (STANDING):  calcium carbonate    500 mG (Tums) Chewable 1 Tablet(s) Chew three times a day  carvedilol 6.25 milliGRAM(s) Oral every 12 hours  enoxaparin Injectable 40 milliGRAM(s) SubCutaneous daily  ibuprofen  Tablet. 400 milliGRAM(s) Oral every 6 hours  losartan 100 milliGRAM(s) Oral daily  magnesium sulfate  IVPB 2 Gram(s) IV Intermittent once  pantoprazole    Tablet 40 milliGRAM(s) Oral before breakfast    MEDICATIONS  (PRN):  acetaminophen   Tablet .. 975 milliGRAM(s) Oral every 6 hours PRN Mild Pain (1 - 3)  oxyCODONE    IR 10 milliGRAM(s) Oral every 4 hours PRN Severe Pain (7 - 10)  oxyCODONE    IR 5 milliGRAM(s) Oral every 4 hours PRN Moderate Pain (4 - 6)

## 2020-11-22 NOTE — PROGRESS NOTE ADULT - ASSESSMENT
70y female s/p LAR with diverting loop ileostomy, R salpingoophorectomy, D&C 11/10, post op course c/b ileus.      - Diet: Advance to LRD  - Will continue to monitor ostomy output closely    - Continue with PO protonix ordered daily  - Venodynes, Lovenox for DVT ppx   - OOB, ambulation   - Will f/u AM labs  - PT: d/c home with no skilled needs  - No acute GYN concerns, as per GYN    Red Team Surgery [phone]

## 2020-11-23 ENCOUNTER — TRANSCRIPTION ENCOUNTER (OUTPATIENT)
Age: 70
End: 2020-11-23

## 2020-11-23 VITALS
RESPIRATION RATE: 18 BRPM | HEART RATE: 68 BPM | DIASTOLIC BLOOD PRESSURE: 58 MMHG | OXYGEN SATURATION: 96 % | TEMPERATURE: 99 F | SYSTOLIC BLOOD PRESSURE: 110 MMHG

## 2020-11-23 LAB
HCT VFR BLD CALC: 28.2 % — LOW (ref 34.5–45)
HGB BLD-MCNC: 8.6 G/DL — LOW (ref 11.5–15.5)
MAGNESIUM SERPL-MCNC: 2.1 MG/DL — SIGNIFICANT CHANGE UP (ref 1.6–2.6)
MCHC RBC-ENTMCNC: 18.4 PG — LOW (ref 27–34)
MCHC RBC-ENTMCNC: 30.5 GM/DL — LOW (ref 32–36)
MCV RBC AUTO: 60.3 FL — LOW (ref 80–100)
NRBC # BLD: 0 /100 WBCS — SIGNIFICANT CHANGE UP (ref 0–0)
PHOSPHATE SERPL-MCNC: 4.5 MG/DL — SIGNIFICANT CHANGE UP (ref 2.5–4.5)
PLATELET # BLD AUTO: 565 K/UL — HIGH (ref 150–400)
RBC # BLD: 4.68 M/UL — SIGNIFICANT CHANGE UP (ref 3.8–5.2)
RBC # FLD: 19.1 % — HIGH (ref 10.3–14.5)
SARS-COV-2 RNA SPEC QL NAA+PROBE: SIGNIFICANT CHANGE UP
WBC # BLD: 6.51 K/UL — SIGNIFICANT CHANGE UP (ref 3.8–10.5)
WBC # FLD AUTO: 6.51 K/UL — SIGNIFICANT CHANGE UP (ref 3.8–10.5)

## 2020-11-23 PROCEDURE — 97530 THERAPEUTIC ACTIVITIES: CPT

## 2020-11-23 PROCEDURE — 74177 CT ABD & PELVIS W/CONTRAST: CPT

## 2020-11-23 PROCEDURE — 81003 URINALYSIS AUTO W/O SCOPE: CPT

## 2020-11-23 PROCEDURE — 84100 ASSAY OF PHOSPHORUS: CPT

## 2020-11-23 PROCEDURE — 80048 BASIC METABOLIC PNL TOTAL CA: CPT

## 2020-11-23 PROCEDURE — C9399: CPT

## 2020-11-23 PROCEDURE — 83735 ASSAY OF MAGNESIUM: CPT

## 2020-11-23 PROCEDURE — 86850 RBC ANTIBODY SCREEN: CPT

## 2020-11-23 PROCEDURE — 86901 BLOOD TYPING SEROLOGIC RH(D): CPT

## 2020-11-23 PROCEDURE — 88305 TISSUE EXAM BY PATHOLOGIST: CPT

## 2020-11-23 PROCEDURE — U0003: CPT

## 2020-11-23 PROCEDURE — 85027 COMPLETE CBC AUTOMATED: CPT

## 2020-11-23 PROCEDURE — 74018 RADEX ABDOMEN 1 VIEW: CPT

## 2020-11-23 PROCEDURE — 88307 TISSUE EXAM BY PATHOLOGIST: CPT

## 2020-11-23 PROCEDURE — 97116 GAIT TRAINING THERAPY: CPT

## 2020-11-23 PROCEDURE — C1758: CPT

## 2020-11-23 PROCEDURE — C1889: CPT

## 2020-11-23 PROCEDURE — 88331 PATH CONSLTJ SURG 1 BLK 1SPC: CPT

## 2020-11-23 PROCEDURE — 82962 GLUCOSE BLOOD TEST: CPT

## 2020-11-23 PROCEDURE — 86900 BLOOD TYPING SEROLOGIC ABO: CPT

## 2020-11-23 RX ORDER — OXYCODONE HYDROCHLORIDE 5 MG/1
1 TABLET ORAL
Qty: 15 | Refills: 0
Start: 2020-11-23 | End: 2020-11-29

## 2020-11-23 RX ORDER — LOPERAMIDE HCL 2 MG
2 TABLET ORAL
Refills: 0 | Status: DISCONTINUED | OUTPATIENT
Start: 2020-11-23 | End: 2020-11-23

## 2020-11-23 RX ORDER — LOPERAMIDE HCL 2 MG
1 TABLET ORAL
Qty: 0 | Refills: 0 | DISCHARGE
Start: 2020-11-23

## 2020-11-23 RX ADMIN — LOSARTAN POTASSIUM 100 MILLIGRAM(S): 100 TABLET, FILM COATED ORAL at 05:56

## 2020-11-23 RX ADMIN — Medication 400 MILLIGRAM(S): at 11:47

## 2020-11-23 RX ADMIN — Medication 400 MILLIGRAM(S): at 06:26

## 2020-11-23 RX ADMIN — Medication 400 MILLIGRAM(S): at 12:17

## 2020-11-23 RX ADMIN — Medication 400 MILLIGRAM(S): at 05:56

## 2020-11-23 RX ADMIN — PANTOPRAZOLE SODIUM 40 MILLIGRAM(S): 20 TABLET, DELAYED RELEASE ORAL at 05:55

## 2020-11-23 RX ADMIN — Medication 1 TABLET(S): at 05:55

## 2020-11-23 RX ADMIN — ENOXAPARIN SODIUM 40 MILLIGRAM(S): 100 INJECTION SUBCUTANEOUS at 11:47

## 2020-11-23 NOTE — PROGRESS NOTE ADULT - ASSESSMENT
70y female s/p LAR with diverting loop ileostomy, R salpingoophorectomy, D&C 11/10, post op course c/b ileus now resolved.     - Diet: LRD  - Will continue to monitor ostomy output closely; will start imodium 1 tablet BID for high ostomy output  - Continue with PO protonix ordered daily  - Venodynes, Lovenox for DVT ppx   - OOB, ambulation   - Will f/u AM labs  - PT: d/c home with no skilled needs  - No acute GYN concerns, as per GYN  - likely dc home today    Red Team Surgery [phone]

## 2020-11-23 NOTE — DISCHARGE NOTE NURSING/CASE MANAGEMENT/SOCIAL WORK - NSSCTYPOFSERV_GEN_ALL_CORE
Scheduled for  start of care   11/24/20    Skilled  RN chelsea  for reinforcement of ostomy care.    Agency will contact  you  to set  up   appointment.   IF you have  any questions to same feel free to  contact  agency.

## 2020-11-23 NOTE — DISCHARGE NOTE NURSING/CASE MANAGEMENT/SOCIAL WORK - PATIENT PORTAL LINK FT
You can access the FollowMyHealth Patient Portal offered by [facility] by registering at the following website: [email or web address]. By joining [employer]’s FollowMyHealth portal, you will also be able to view your health information using other applications (apps) compatible with our system.

## 2020-11-23 NOTE — PROGRESS NOTE ADULT - SUBJECTIVE AND OBJECTIVE BOX
Surgery Progress Note    SUBJECTIVE: Pt seen and examined at bedside. Patient comfortable and in no-apparent distress. Patient is feeling well. Pain is controlled. Ostomy functioning with +Gas/+BM. Tolerating LRD without nausea or vomiting. Patient OOB and ambulating.    Vital Signs Last 24 Hrs  T(C): 36.5 (23 Nov 2020 05:51), Max: 37.1 (22 Nov 2020 16:58)  T(F): 97.7 (23 Nov 2020 05:51), Max: 98.7 (22 Nov 2020 16:58)  HR: 64 (23 Nov 2020 05:51) (63 - 72)  BP: 138/60 (23 Nov 2020 01:32) (127/74 - 152/76)  BP(mean): --  RR: 18 (23 Nov 2020 05:51) (18 - 18)  SpO2: 94% (23 Nov 2020 05:51) (94% - 98%)      PHYSICAL EXAM:   General: NAD, resting comfortably  Respiratory: nonlabored breathing, normal chest wall expansion  Abdominal: Soft, NT, ND, incisions c/d/i, ostomy pink and viable. +/+ w/ liquid output      LABS:                        8.5    6.10  )-----------( 596      ( 22 Nov 2020 09:07 )             27.8     11-22    138  |  101  |  8   ----------------------------<  84  3.9   |  27  |  0.63    Ca    9.2      22 Nov 2020 09:07  Phos  4.4     11-22  Mg     1.7     11-22            INs and OUTs:    11-22-20 @ 07:01  -  11-23-20 @ 07:00  --------------------------------------------------------  IN: 720 mL / OUT: 2975 mL / NET: -2255 mL

## 2020-11-24 ENCOUNTER — NON-APPOINTMENT (OUTPATIENT)
Age: 70
End: 2020-11-24

## 2020-11-27 ENCOUNTER — APPOINTMENT (OUTPATIENT)
Dept: INTERNAL MEDICINE | Facility: CLINIC | Age: 70
End: 2020-11-27
Payer: MEDICARE

## 2020-11-27 DIAGNOSIS — Z09 ENCOUNTER FOR FOLLOW-UP EXAMINATION AFTER COMPLETED TREATMENT FOR CONDITIONS OTHER THAN MALIGNANT NEOPLASM: ICD-10-CM

## 2020-11-27 DIAGNOSIS — K57.92 DIVERTICULITIS OF INTESTINE, PART UNSPECIFIED, W/OUT PERFORATION OR ABSCESS W/OUT BLEEDING: ICD-10-CM

## 2020-11-27 PROCEDURE — 99496 TRANSJ CARE MGMT HIGH F2F 7D: CPT | Mod: 95

## 2020-11-27 RX ORDER — FLUCONAZOLE 150 MG/1
150 TABLET ORAL
Qty: 7 | Refills: 0 | Status: DISCONTINUED | COMMUNITY
Start: 2020-11-04 | End: 2020-11-27

## 2020-11-27 RX ORDER — METRONIDAZOLE 500 MG/1
500 TABLET ORAL
Qty: 3 | Refills: 0 | Status: DISCONTINUED | COMMUNITY
Start: 2020-10-16 | End: 2020-11-27

## 2020-11-27 NOTE — PHYSICAL EXAM
[No Acute Distress] : no acute distress [Well Nourished] : well nourished [Normal Sclera/Conjunctiva] : normal sclera/conjunctiva [PERRL] : pupils equal round and reactive to light

## 2020-11-27 NOTE — HISTORY OF PRESENT ILLNESS
[Home] : at home, [unfilled] , at the time of the visit. [Medical Office: ([facility])___] : at the medical office located in  [Verbal consent obtained from patient] : the patient, [unfilled] [Post-hospitalization from ___ Hospital] : Post-hospitalization from [unfilled] Hospital [Admitted on: ___] : The patient was admitted on [unfilled] [Discharged on ___] : discharged on [unfilled] [Discharge Summary] : discharge summary [Pertinent Labs] : pertinent labs [Radiology Findings] : radiology findings [Discharge Med List] : discharge medication list [Other: ____] : [unfilled] [Med Reconciliation] : medication reconciliation has been completed [Patient Contacted By: ____] : and contacted by [unfilled] [FreeTextEntry2] : Admission Date	10-Nov-2020 10:55 \par Reason for Admission	diverticulitis \par Hospital Course	 \par 70 yr old female with PMH HTN, Breast Ca (with radiation), s/p right breast \par lumpectomy, diverticulitis. Present today with recent (9/2020) hospital \par admission for complicated diverticulitis, CT scan revealed significant chronic, \par smoldering diverticulitis with some extraluminal air droplets and possible \par pericolonic fluid collections, and enlarged uterus. \par On 11/10, pt s/p ERAs low anterior resection, diverting loop ileostomy, right \par salpingoophorectomy, d&c. Pt tolerated procedure well, was extubated and sent \par to pacu stable. POD1, PT eval: home with no needs.  Patient began having GI \par fxn, tolerating clears.  However post op course complicated by ileus. She was \par made NPO x several days. An AXR on 11/12 showed Dilated small bowel loops, left \par abdomen, may be obstructed.  A CT on 11/19 showed no evidence of anastomotic \par leak. GI function returned and her diet was advanced.  11/23/2020, At the time \par of discharge, the patient was hemodynamically stable, tolerating PO diet, \par voiding urine, passing stool, ambulating and was comfortable with adequate pain \par control. The patient was instructed to follow up with Dr. Marx and GYN \par within 1-2 weeks after discharge. The patient felt comfortable with discharge. \par The patient was discharged to home. The patient had no other issues. \par \par She has been home now and doing well and had ileostomy RN come today \par \par Her blood pressure was at times high in hospital\par Before surgery we changed the BP meds\par \par Today 128/74

## 2020-11-30 ENCOUNTER — APPOINTMENT (OUTPATIENT)
Dept: COLORECTAL SURGERY | Facility: CLINIC | Age: 70
End: 2020-11-30
Payer: MEDICARE

## 2020-11-30 ENCOUNTER — APPOINTMENT (OUTPATIENT)
Dept: GYNECOLOGIC ONCOLOGY | Facility: CLINIC | Age: 70
End: 2020-11-30
Payer: MEDICARE

## 2020-11-30 VITALS
SYSTOLIC BLOOD PRESSURE: 153 MMHG | HEART RATE: 58 BPM | TEMPERATURE: 98.5 F | HEIGHT: 68 IN | DIASTOLIC BLOOD PRESSURE: 83 MMHG

## 2020-11-30 PROCEDURE — 99024 POSTOP FOLLOW-UP VISIT: CPT

## 2020-12-09 NOTE — HISTORY OF PRESENT ILLNESS
[FreeTextEntry1] : Pleasant 70-year-old female who presents for her first postoperative visit. She is approximately 3 weeks status post resection for complicated diverticulitis. She had a prolonged postoperative course due to a postop ileus.\par \par Patient looks and feels well. Her only complaint is that of some discomfort when taking a deep breath. She denies temperature elevation or blood per ostomy. Her ostomy output has decreased.\par \par Final pathology was consistent with complicated diverticular disease. Gynecologic organs removed were benign.\par \par Physical examination reveals a soft, nontender, nondistended abdomen. She has an ostomy appliance in place on her right lower quadrant loop ileostomy. Her infraumbilical midline wound is healing nicely with no evidence of infection. Staples were removed.\par \par Seems that the patient is suffering from musculoskeletal discomfort related to a prolonged stay in a hospital bed. If the pain does not improve in 2-3 days I asked her to call me. I want her to remain on a low-residue diet. I will see her in approximately 3 weeks for sigmoidoscopy under sedation.\par \par Plan is to eventually close the ileostomy 12 weeks after it was constructed and after the performance of a Hypaque enema to assess her anastomosis.

## 2020-12-15 RX ORDER — NEOMYCIN SULFATE 500 MG/1
500 TABLET ORAL
Qty: 3 | Refills: 0 | Status: DISCONTINUED | COMMUNITY
Start: 2020-10-16 | End: 2020-12-15

## 2020-12-20 ENCOUNTER — APPOINTMENT (OUTPATIENT)
Dept: DISASTER EMERGENCY | Facility: CLINIC | Age: 70
End: 2020-12-20

## 2020-12-21 PROBLEM — Z87.09 HISTORY OF ACUTE BRONCHITIS: Status: RESOLVED | Noted: 2019-12-02 | Resolved: 2020-12-21

## 2020-12-22 LAB — SARS-COV-2 N GENE NPH QL NAA+PROBE: NOT DETECTED

## 2020-12-23 ENCOUNTER — APPOINTMENT (OUTPATIENT)
Dept: COLORECTAL SURGERY | Facility: CLINIC | Age: 70
End: 2020-12-23
Payer: MEDICARE

## 2020-12-23 PROCEDURE — 99024 POSTOP FOLLOW-UP VISIT: CPT

## 2021-01-14 ENCOUNTER — OUTPATIENT (OUTPATIENT)
Dept: OUTPATIENT SERVICES | Facility: HOSPITAL | Age: 71
LOS: 1 days | End: 2021-01-14
Payer: MEDICARE

## 2021-01-14 VITALS
RESPIRATION RATE: 20 BRPM | OXYGEN SATURATION: 98 % | HEART RATE: 74 BPM | TEMPERATURE: 98 F | HEIGHT: 68 IN | WEIGHT: 151.9 LBS | DIASTOLIC BLOOD PRESSURE: 97 MMHG | SYSTOLIC BLOOD PRESSURE: 150 MMHG

## 2021-01-14 DIAGNOSIS — Z98.890 OTHER SPECIFIED POSTPROCEDURAL STATES: Chronic | ICD-10-CM

## 2021-01-14 DIAGNOSIS — Z98.891 HISTORY OF UTERINE SCAR FROM PREVIOUS SURGERY: Chronic | ICD-10-CM

## 2021-01-14 DIAGNOSIS — K57.92 DIVERTICULITIS OF INTESTINE, PART UNSPECIFIED, WITHOUT PERFORATION OR ABSCESS WITHOUT BLEEDING: ICD-10-CM

## 2021-01-14 DIAGNOSIS — Z90.79 ACQUIRED ABSENCE OF OTHER GENITAL ORGAN(S): Chronic | ICD-10-CM

## 2021-01-14 DIAGNOSIS — Z29.9 ENCOUNTER FOR PROPHYLACTIC MEASURES, UNSPECIFIED: ICD-10-CM

## 2021-01-14 DIAGNOSIS — Z90.721 ACQUIRED ABSENCE OF OVARIES, UNILATERAL: Chronic | ICD-10-CM

## 2021-01-14 DIAGNOSIS — Z01.818 ENCOUNTER FOR OTHER PREPROCEDURAL EXAMINATION: ICD-10-CM

## 2021-01-14 DIAGNOSIS — K57.32 DIVERTICULITIS OF LARGE INTESTINE WITHOUT PERFORATION OR ABSCESS WITHOUT BLEEDING: ICD-10-CM

## 2021-01-14 LAB
A1C WITH ESTIMATED AVERAGE GLUCOSE RESULT: 5.4 % — SIGNIFICANT CHANGE UP (ref 4–5.6)
ANION GAP SERPL CALC-SCNC: 10 MMOL/L — SIGNIFICANT CHANGE UP (ref 5–17)
BLD GP AB SCN SERPL QL: NEGATIVE — SIGNIFICANT CHANGE UP
BUN SERPL-MCNC: 12 MG/DL — SIGNIFICANT CHANGE UP (ref 7–23)
CALCIUM SERPL-MCNC: 9.7 MG/DL — SIGNIFICANT CHANGE UP (ref 8.4–10.5)
CHLORIDE SERPL-SCNC: 97 MMOL/L — SIGNIFICANT CHANGE UP (ref 96–108)
CO2 SERPL-SCNC: 28 MMOL/L — SIGNIFICANT CHANGE UP (ref 22–31)
CREAT SERPL-MCNC: 0.61 MG/DL — SIGNIFICANT CHANGE UP (ref 0.5–1.3)
ESTIMATED AVERAGE GLUCOSE: 108 MG/DL — SIGNIFICANT CHANGE UP (ref 68–114)
GLUCOSE SERPL-MCNC: 89 MG/DL — SIGNIFICANT CHANGE UP (ref 70–99)
HCT VFR BLD CALC: 34.4 % — LOW (ref 34.5–45)
HGB BLD-MCNC: 10.7 G/DL — LOW (ref 11.5–15.5)
MCHC RBC-ENTMCNC: 18.7 PG — LOW (ref 27–34)
MCHC RBC-ENTMCNC: 31.1 GM/DL — LOW (ref 32–36)
MCV RBC AUTO: 60.1 FL — LOW (ref 80–100)
NRBC # BLD: 0 /100 WBCS — SIGNIFICANT CHANGE UP (ref 0–0)
PLATELET # BLD AUTO: 422 K/UL — HIGH (ref 150–400)
POTASSIUM SERPL-MCNC: 4.4 MMOL/L — SIGNIFICANT CHANGE UP (ref 3.5–5.3)
POTASSIUM SERPL-SCNC: 4.4 MMOL/L — SIGNIFICANT CHANGE UP (ref 3.5–5.3)
RBC # BLD: 5.72 M/UL — HIGH (ref 3.8–5.2)
RBC # FLD: 16.9 % — HIGH (ref 10.3–14.5)
RH IG SCN BLD-IMP: POSITIVE — SIGNIFICANT CHANGE UP
SODIUM SERPL-SCNC: 135 MMOL/L — SIGNIFICANT CHANGE UP (ref 135–145)
WBC # BLD: 6.6 K/UL — SIGNIFICANT CHANGE UP (ref 3.8–10.5)
WBC # FLD AUTO: 6.6 K/UL — SIGNIFICANT CHANGE UP (ref 3.8–10.5)

## 2021-01-14 PROCEDURE — 80048 BASIC METABOLIC PNL TOTAL CA: CPT

## 2021-01-14 PROCEDURE — G0463: CPT

## 2021-01-14 PROCEDURE — 85027 COMPLETE CBC AUTOMATED: CPT

## 2021-01-14 PROCEDURE — 86850 RBC ANTIBODY SCREEN: CPT

## 2021-01-14 PROCEDURE — 83036 HEMOGLOBIN GLYCOSYLATED A1C: CPT

## 2021-01-14 PROCEDURE — 86901 BLOOD TYPING SEROLOGIC RH(D): CPT

## 2021-01-14 PROCEDURE — 86900 BLOOD TYPING SEROLOGIC ABO: CPT

## 2021-01-14 RX ORDER — CHOLECALCIFEROL (VITAMIN D3) 125 MCG
1400 CAPSULE ORAL
Qty: 0 | Refills: 0 | DISCHARGE

## 2021-01-14 RX ORDER — CEFOTETAN DISODIUM 1 G
2 VIAL (EA) INJECTION ONCE
Refills: 0 | Status: DISCONTINUED | OUTPATIENT
Start: 2021-01-28 | End: 2021-01-30

## 2021-01-14 RX ORDER — ASCORBIC ACID 60 MG
1 TABLET,CHEWABLE ORAL
Qty: 0 | Refills: 0 | DISCHARGE

## 2021-01-14 RX ORDER — L.ACIDOPH/B.ANIMALIS/B.LONGUM 15B CELL
1 CAPSULE ORAL
Qty: 0 | Refills: 0 | DISCHARGE

## 2021-01-14 NOTE — H&P PST ADULT - HISTORY OF PRESENT ILLNESS
70 yr old female with PMH HTN, Breast Ca (with radiation), Thalassemia minor, s/p right breast lumpectomy, diverticulitis. Due to complicated diverticulitis, CT scan revealed significant chronic, smoldering diverticulitis with some extraluminal air droplets and possible pericolonic fluid collections, and enlarged uterus. Pt had Open anterior resection, right salpingectomy oophorectomy & D&C on 11/10/20. and now for closure of loop ileostomy on 1/28/21.      **COVID swab scheduled for 1/25 at [facility]**

## 2021-01-14 NOTE — H&P PST ADULT - NSICDXPROBLEM_GEN_ALL_CORE_FT
PROBLEM DIAGNOSES  Problem: Diverticulitis  Assessment and Plan: closure of loop ileostomy   cbc, bmp, T & S, hbgA1c  covid test 1/25  hypage enema scheduled by surgeon     Problem: Prophylactic measure  Assessment and Plan: The Caprini score indicates this patient is at risk for a VTE event (score 3-5).  Most surgical patients in this group would benefit from pharmacologic prophylaxis.  The surgical team will determine the balance between VTE risk and bleeding risk         PROBLEM DIAGNOSES  Problem: Diverticulitis  Assessment and Plan: closure of loop ileostomy   cbc, bmp, T & S, hbgA1c  FS on DOS  covid test 1/25  hypage enema scheduled by surgeon     Problem: Prophylactic measure  Assessment and Plan: The Caprini score indicates this patient is at risk for a VTE event (score 3-5).  Most surgical patients in this group would benefit from pharmacologic prophylaxis.  The surgical team will determine the balance between VTE risk and bleeding risk

## 2021-01-14 NOTE — H&P PST ADULT - ASSESSMENT
CAPRINI SCORE [CLOT]    AGE RELATED RISK FACTORS                                                       MOBILITY RELATED FACTORS  [ ] Age 41-60 years                                            (1 Point)                  [ ] Bed rest                                                        (1 Point)  [ x] Age: 61-74 years                                           (2 Points)                 [ ] Plaster cast                                                   (2 Points)  [ ] Age= 75 years                                              (3 Points)                 [ ] Bed bound for more than 72 hours                 (2 Points)    DISEASE RELATED RISK FACTORS                                               GENDER SPECIFIC FACTORS  [ ] Edema in the lower extremities                       (1 Point)                  [ ] Pregnancy                                                     (1 Point)  [ ] Varicose veins                                               (1 Point)                  [ ] Post-partum < 6 weeks                                   (1 Point)             [ ] BMI > 25 Kg/m2                                            (1 Point)                  [ ] Hormonal therapy  or oral contraception          (1 Point)                 [ ] Sepsis (in the previous month)                        (1 Point)                  [ ] History of pregnancy complications                 (1 point)  [ ] Pneumonia or serious lung disease                                               [ ] Unexplained or recurrent                     (1 Point)           (in the previous month)                               (1 Point)  [ ] Abnormal pulmonary function test                     (1 Point)                 SURGERY RELATED RISK FACTORS  [ ] Acute myocardial infarction                              (1 Point)                 [ ]  Section                                             (1 Point)  [ ] Congestive heart failure (in the previous month)  (1 Point)               [ ] Minor surgery                                                  (1 Point)   [ ] Inflammatory bowel disease                             (1 Point)                 [ ] Arthroscopic surgery                                        (2 Points)  [ ] Central venous access                                      (2 Points)                [x ] General surgery lasting more than 45 minutes   (2 Points)       [ ] Stroke (in the previous month)                          (5 Points)               [ ] Elective arthroplasty                                         (5 Points)                                                                                                                                               HEMATOLOGY RELATED FACTORS                                                 TRAUMA RELATED RISK FACTORS  [ ] Prior episodes of VTE                                     (3 Points)                 [ ] Fracture of the hip, pelvis, or leg                       (5 Points)  [ ] Positive family history for VTE                         (3 Points)                 [ ] Acute spinal cord injury (in the previous month)  (5 Points)  [ ] Prothrombin 65854 A                                     (3 Points)                 [ ] Paralysis  (less than 1 month)                             (5 Points)  [ ] Factor V Leiden                                             (3 Points)                  [ ] Multiple Trauma within 1 month                        (5 Points)  [ ] Lupus anticoagulants                                     (3 Points)                                                           [ ] Anticardiolipin antibodies                               (3 Points)                                                       [ ] High homocysteine in the blood                      (3 Points)                                             [ ] Other congenital or acquired thrombophilia      (3 Points)                                                [ ] Heparin induced thrombocytopenia                  (3 Points)                                          Total Score [    4      ]

## 2021-01-20 ENCOUNTER — RESULT REVIEW (OUTPATIENT)
Age: 71
End: 2021-01-20

## 2021-01-20 ENCOUNTER — APPOINTMENT (OUTPATIENT)
Dept: RADIOLOGY | Facility: HOSPITAL | Age: 71
End: 2021-01-20
Payer: MEDICARE

## 2021-01-20 ENCOUNTER — OUTPATIENT (OUTPATIENT)
Dept: OUTPATIENT SERVICES | Facility: HOSPITAL | Age: 71
LOS: 1 days | End: 2021-01-20
Payer: MEDICARE

## 2021-01-20 DIAGNOSIS — Z90.79 ACQUIRED ABSENCE OF OTHER GENITAL ORGAN(S): Chronic | ICD-10-CM

## 2021-01-20 DIAGNOSIS — Z98.890 OTHER SPECIFIED POSTPROCEDURAL STATES: Chronic | ICD-10-CM

## 2021-01-20 DIAGNOSIS — Z00.00 ENCOUNTER FOR GENERAL ADULT MEDICAL EXAMINATION WITHOUT ABNORMAL FINDINGS: ICD-10-CM

## 2021-01-20 DIAGNOSIS — K57.32 DIVERTICULITIS OF LARGE INTESTINE WITHOUT PERFORATION OR ABSCESS WITHOUT BLEEDING: ICD-10-CM

## 2021-01-20 DIAGNOSIS — Z98.891 HISTORY OF UTERINE SCAR FROM PREVIOUS SURGERY: Chronic | ICD-10-CM

## 2021-01-20 DIAGNOSIS — Z90.721 ACQUIRED ABSENCE OF OVARIES, UNILATERAL: Chronic | ICD-10-CM

## 2021-01-20 PROCEDURE — 74270 X-RAY XM COLON 1CNTRST STD: CPT | Mod: 26

## 2021-01-20 PROCEDURE — 74270 X-RAY XM COLON 1CNTRST STD: CPT

## 2021-01-21 NOTE — PROGRESS NOTE PEDS - SUBJECTIVE AND OBJECTIVE BOX
Assumed care of patient at this time. Introduced myself to the patient as the nurse. All questions answered at this time.      Claudene Jacob, RN  01/20/21 [id number]
EMS called, on the way     Franklin Han LPN  61/58/25 [id number]
EMS here to transfer pt.      Amy Bernstein, RICHARD  30/90/08 [id number]
Nurse to Nurse given to Darryn Pitts 44.      Shaaron Gottron, RN  01/20/21 2013
Surgery Progress Note    SUBJECTIVE: No acute events overnight. Pt seen and examined at bedside. Patient comfortable. No nausea, vomiting, diarrhea. Pain is controlled. +Flatus/+BM. Tolerating diet.    Vital Signs Last 24 Hrs  T(C): 36.7 (18 Nov 2020 06:10), Max: 36.9 (17 Nov 2020 13:03)  T(F): 98 (18 Nov 2020 06:10), Max: 98.4 (17 Nov 2020 13:03)  HR: 72 (18 Nov 2020 06:10) (62 - 75)  BP: 155/81 (18 Nov 2020 06:10) (139/77 - 164/82)  BP(mean): --  RR: 18 (18 Nov 2020 06:10) (18 - 18)  SpO2: 97% (18 Nov 2020 06:10) (95% - 97%)    General: NAD, resting comfortably  Respiratory: nonlabored breathing, normal chest wall expansion  Abdominal: Soft, NT, ND, incisions c/d/i, ostomy pink and viable with stool in bag.  Red rubber cath dislodged into ostomy bag (was fixed yesterday and dislodged again)  Vascular: Warm and well perfused  Extremities: No edema    LABS:                        8.2    7.90  )-----------( 556      ( 17 Nov 2020 07:06 )             25.9     11-17    132<L>  |  99  |  5<L>  ----------------------------<  108<H>  4.3   |  26  |  0.54    Ca    8.9      17 Nov 2020 07:06  Phos  3.7     11-17  Mg     1.8     11-17            INs and OUTs:    11-17-20 @ 07:01  -  11-18-20 @ 07:00  --------------------------------------------------------  IN: 2380 mL / OUT: 2320 mL / NET: 60 mL        Medications:  MEDICATIONS  (STANDING):  calcium carbonate    500 mG (Tums) Chewable 1 Tablet(s) Chew three times a day  carvedilol 6.25 milliGRAM(s) Oral every 12 hours  dextrose 5% + sodium chloride 0.9% with potassium chloride 20 mEq/L 1000 milliLiter(s) (75 mL/Hr) IV Continuous <Continuous>  enoxaparin Injectable 40 milliGRAM(s) SubCutaneous daily  ibuprofen  Tablet. 400 milliGRAM(s) Oral every 6 hours  losartan 100 milliGRAM(s) Oral daily  pantoprazole    Tablet 40 milliGRAM(s) Oral before breakfast    MEDICATIONS  (PRN):  acetaminophen   Tablet .. 975 milliGRAM(s) Oral every 6 hours PRN Mild Pain (1 - 3)  oxyCODONE    IR 5 milliGRAM(s) Oral every 4 hours PRN Moderate Pain (4 - 6)  oxyCODONE    IR 10 milliGRAM(s) Oral every 4 hours PRN Severe Pain (7 - 10)

## 2021-01-25 ENCOUNTER — OUTPATIENT (OUTPATIENT)
Dept: OUTPATIENT SERVICES | Facility: HOSPITAL | Age: 71
LOS: 1 days | End: 2021-01-25

## 2021-01-25 DIAGNOSIS — Z98.890 OTHER SPECIFIED POSTPROCEDURAL STATES: Chronic | ICD-10-CM

## 2021-01-25 DIAGNOSIS — Z98.891 HISTORY OF UTERINE SCAR FROM PREVIOUS SURGERY: Chronic | ICD-10-CM

## 2021-01-25 DIAGNOSIS — Z11.52 ENCOUNTER FOR SCREENING FOR COVID-19: ICD-10-CM

## 2021-01-25 DIAGNOSIS — Z90.721 ACQUIRED ABSENCE OF OVARIES, UNILATERAL: Chronic | ICD-10-CM

## 2021-01-25 DIAGNOSIS — Z90.79 ACQUIRED ABSENCE OF OTHER GENITAL ORGAN(S): Chronic | ICD-10-CM

## 2021-01-25 LAB — SARS-COV-2 RNA SPEC QL NAA+PROBE: SIGNIFICANT CHANGE UP

## 2021-01-27 ENCOUNTER — TRANSCRIPTION ENCOUNTER (OUTPATIENT)
Age: 71
End: 2021-01-27

## 2021-01-28 ENCOUNTER — INPATIENT (INPATIENT)
Facility: HOSPITAL | Age: 71
LOS: 4 days | Discharge: HOME | DRG: 330 | End: 2021-02-02
Attending: SURGERY | Admitting: SURGERY
Payer: MEDICARE

## 2021-01-28 ENCOUNTER — RESULT REVIEW (OUTPATIENT)
Age: 71
End: 2021-01-28

## 2021-01-28 ENCOUNTER — APPOINTMENT (OUTPATIENT)
Dept: COLORECTAL SURGERY | Facility: HOSPITAL | Age: 71
End: 2021-01-28
Payer: MEDICARE

## 2021-01-28 VITALS
RESPIRATION RATE: 18 BRPM | HEIGHT: 68 IN | WEIGHT: 151.9 LBS | TEMPERATURE: 98 F | OXYGEN SATURATION: 100 % | SYSTOLIC BLOOD PRESSURE: 157 MMHG | HEART RATE: 88 BPM | DIASTOLIC BLOOD PRESSURE: 81 MMHG

## 2021-01-28 DIAGNOSIS — Z98.891 HISTORY OF UTERINE SCAR FROM PREVIOUS SURGERY: Chronic | ICD-10-CM

## 2021-01-28 DIAGNOSIS — Z98.890 OTHER SPECIFIED POSTPROCEDURAL STATES: Chronic | ICD-10-CM

## 2021-01-28 DIAGNOSIS — Z90.79 ACQUIRED ABSENCE OF OTHER GENITAL ORGAN(S): Chronic | ICD-10-CM

## 2021-01-28 DIAGNOSIS — K57.32 DIVERTICULITIS OF LARGE INTESTINE WITHOUT PERFORATION OR ABSCESS WITHOUT BLEEDING: ICD-10-CM

## 2021-01-28 DIAGNOSIS — Z90.721 ACQUIRED ABSENCE OF OVARIES, UNILATERAL: Chronic | ICD-10-CM

## 2021-01-28 LAB — GLUCOSE BLDC GLUCOMTR-MCNC: 146 MG/DL — HIGH (ref 70–99)

## 2021-01-28 PROCEDURE — 88304 TISSUE EXAM BY PATHOLOGIST: CPT | Mod: 26

## 2021-01-28 PROCEDURE — 44625 REPAIR BOWEL OPENING: CPT | Mod: 58

## 2021-01-28 RX ORDER — MORPHINE SULFATE 50 MG/1
0.15 CAPSULE, EXTENDED RELEASE ORAL ONCE
Refills: 0 | Status: DISCONTINUED | OUTPATIENT
Start: 2021-01-28 | End: 2021-01-29

## 2021-01-28 RX ORDER — HYDROMORPHONE HYDROCHLORIDE 2 MG/ML
0.25 INJECTION INTRAMUSCULAR; INTRAVENOUS; SUBCUTANEOUS ONCE
Refills: 0 | Status: DISCONTINUED | OUTPATIENT
Start: 2021-01-28 | End: 2021-01-28

## 2021-01-28 RX ORDER — GABAPENTIN 400 MG/1
600 CAPSULE ORAL ONCE
Refills: 0 | Status: COMPLETED | OUTPATIENT
Start: 2021-01-28 | End: 2021-01-28

## 2021-01-28 RX ORDER — ACETAMINOPHEN 500 MG
1000 TABLET ORAL EVERY 6 HOURS
Refills: 0 | Status: COMPLETED | OUTPATIENT
Start: 2021-01-28 | End: 2021-01-29

## 2021-01-28 RX ORDER — ENOXAPARIN SODIUM 100 MG/ML
40 INJECTION SUBCUTANEOUS EVERY 24 HOURS
Refills: 0 | Status: DISCONTINUED | OUTPATIENT
Start: 2021-01-28 | End: 2021-01-30

## 2021-01-28 RX ORDER — CELECOXIB 200 MG/1
400 CAPSULE ORAL ONCE
Refills: 0 | Status: COMPLETED | OUTPATIENT
Start: 2021-01-28 | End: 2021-01-28

## 2021-01-28 RX ORDER — NALOXONE HYDROCHLORIDE 4 MG/.1ML
0.1 SPRAY NASAL
Refills: 0 | Status: DISCONTINUED | OUTPATIENT
Start: 2021-01-28 | End: 2021-01-30

## 2021-01-28 RX ORDER — OXYCODONE HYDROCHLORIDE 5 MG/1
5 TABLET ORAL EVERY 4 HOURS
Refills: 0 | Status: DISCONTINUED | OUTPATIENT
Start: 2021-01-28 | End: 2021-02-02

## 2021-01-28 RX ORDER — OXYCODONE HYDROCHLORIDE 5 MG/1
10 TABLET ORAL EVERY 4 HOURS
Refills: 0 | Status: DISCONTINUED | OUTPATIENT
Start: 2021-01-28 | End: 2021-02-02

## 2021-01-28 RX ORDER — SODIUM CHLORIDE 9 MG/ML
3 INJECTION INTRAMUSCULAR; INTRAVENOUS; SUBCUTANEOUS EVERY 8 HOURS
Refills: 0 | Status: DISCONTINUED | OUTPATIENT
Start: 2021-01-28 | End: 2021-01-28

## 2021-01-28 RX ORDER — SODIUM CHLORIDE 9 MG/ML
1000 INJECTION, SOLUTION INTRAVENOUS
Refills: 0 | Status: DISCONTINUED | OUTPATIENT
Start: 2021-01-28 | End: 2021-01-29

## 2021-01-28 RX ORDER — KETOROLAC TROMETHAMINE 30 MG/ML
15 SYRINGE (ML) INJECTION EVERY 6 HOURS
Refills: 0 | Status: DISCONTINUED | OUTPATIENT
Start: 2021-01-28 | End: 2021-01-29

## 2021-01-28 RX ORDER — ONDANSETRON 8 MG/1
4 TABLET, FILM COATED ORAL ONCE
Refills: 0 | Status: DISCONTINUED | OUTPATIENT
Start: 2021-01-28 | End: 2021-01-28

## 2021-01-28 RX ADMIN — Medication 15 MILLIGRAM(S): at 21:55

## 2021-01-28 RX ADMIN — GABAPENTIN 600 MILLIGRAM(S): 400 CAPSULE ORAL at 06:45

## 2021-01-28 RX ADMIN — Medication 15 MILLIGRAM(S): at 14:59

## 2021-01-28 RX ADMIN — SODIUM CHLORIDE 40 MILLILITER(S): 9 INJECTION, SOLUTION INTRAVENOUS at 09:43

## 2021-01-28 RX ADMIN — HYDROMORPHONE HYDROCHLORIDE 0.25 MILLIGRAM(S): 2 INJECTION INTRAMUSCULAR; INTRAVENOUS; SUBCUTANEOUS at 09:55

## 2021-01-28 RX ADMIN — CELECOXIB 400 MILLIGRAM(S): 200 CAPSULE ORAL at 06:45

## 2021-01-28 RX ADMIN — Medication 400 MILLIGRAM(S): at 23:36

## 2021-01-28 RX ADMIN — Medication 400 MILLIGRAM(S): at 16:49

## 2021-01-28 NOTE — PHYSICAL THERAPY INITIAL EVALUATION ADULT - ADDITIONAL COMMENTS
pt lives in private home with , 6 steps to enter +HR, 1 flight of stairs inside +HR to bedroom. Prior to admission, pt was I with all functional mobility and ADLs without AD. +. Retired teacher

## 2021-01-28 NOTE — PHYSICAL THERAPY INITIAL EVALUATION ADULT - BALANCE TRAINING, PT EVAL
GOAL: Pt will increase static/ dynamic standing balance to Good to improve safety with functional activities in 2 weeks.

## 2021-01-28 NOTE — CHART NOTE - NSCHARTNOTEFT_GEN_A_CORE
RED SURGERY POST-OPERATIVE NOTE    PROCEDURE: loop ileostomy closure     Subjective: Pt seen and examined. Pain is well controlled. Patient denies nausea, vomiting , CP, SOB. She has not been out of bed yet.     Vital Signs Last 24 Hrs  T(C): 36.6 (2021 13:00), Max: 36.6 (2021 06:34)  T(F): 97.9 (2021 13:00), Max: 97.9 (2021 06:34)  HR: 61 (2021 14:49) (60 - 88)  BP: 113/63 (2021 14:49) (106/56 - 157/81)  BP(mean): 75 (2021 12:30) (75 - 89)  RR: 16 (2021 14:49) (16 - 18)  SpO2: 99% (2021 14:49) (98% - 100%)  I&O's Detail    2021 07:01  -  2021 15:35  --------------------------------------------------------  IN:    Lactated Ringers: 80 mL  Total IN: 80 mL    OUT:    Indwelling Catheter - Urethral (mL): 500 mL  Total OUT: 500 mL    Total NET: -420 mL        cefoTEtan  IVPB 2  cefoTEtan  IVPB 2  enoxaparin Injectable 40    PAST MEDICAL & SURGICAL HISTORY:  HTN (hypertension)    History of thalassemia minor    Breast CA  with radiation    MVP (mitral valve prolapse)  diagnosed 30 years ago    Diverticulitis    History of ileostomy    History of oophorectomy, unilateral  right with salpingectomy    H/O lumpectomy  2006 right side    Previous  section  X2    History of unilateral salpingectomy  left    S/P endometrial ablation          Physical Exam:  General: NAD, resting comfortably in bed  Pulmonary: Nonlabored breathing, no respiratory distress  Cardiovascular: NSR  Abdominal: soft, NT/ND  Extremities: WWP      LABS:            CAPILLARY BLOOD GLUCOSE      POCT Blood Glucose.: 146 mg/dL (2021 06:28)      Radiology and Additional Studies:    Assessment: 70 yr old female with PMH HTN, Breast Ca (with radiation), Thalassemia minor, s/p right breast lumpectomy, diverticulitis. Due to complicated diverticulitis, Pt had Open anterior resection, right salpingectomy oophorectomy & D&C on 11/10/20. Pt is now 4 hours s/p closure of loop ileostomy, recovering appropriately.     Plan:  - ERAS protocol  - IVF @ 40cc/hour, CLD  - Pain control as needed  - DVT ppx  - OOB and ambulating as tolerated  - F/u AM labs    Red surgery [id number]

## 2021-01-28 NOTE — PRE-ANESTHESIA EVALUATION ADULT - NSANTHPMHFT_GEN_ALL_CORE
11/6/20 Patient: Neha Castillo III YOB: 1956 Date of Visit: 11/4/2020 Felicitas Early MD 
[address] [facility] [address] [facility] [address] VIA In Basket Dear Felicitas Early MD, Thank you for referring Mr. Daksha Bryan to [address] [place] [address] for evaluation. My notes for this consultation are attached. If you have questions, please do not hesitate to call me. I look forward to following your patient along with you. Sincerely, Asuncion Calabrese MD 
 
 Reviewed

## 2021-01-28 NOTE — BRIEF OPERATIVE NOTE - NSICDXBRIEFPOSTOP_GEN_ALL_CORE_FT
POST-OP DIAGNOSIS:  Diverticulitis 28-Jan-2021 09:18:36  Janae Hook  Presence of ileostomy 28-Jan-2021 09:18:29  Janae Hook

## 2021-01-28 NOTE — PHYSICAL THERAPY INITIAL EVALUATION ADULT - GAIT DEVIATIONS NOTED, PT EVAL
decreased marissa/increased time in double stance/decreased velocity of limb motion/decreased stride length/decreased weight-shifting ability

## 2021-01-28 NOTE — BRIEF OPERATIVE NOTE - NSICDXBRIEFPREOP_GEN_ALL_CORE_FT
PRE-OP DIAGNOSIS:  Presence of ileostomy 28-Jan-2021 09:18:18  Janae Hook  Diverticulitis 28-Jan-2021 09:17:58  Janae Hook

## 2021-01-28 NOTE — PHYSICAL THERAPY INITIAL EVALUATION ADULT - PERTINENT HX OF CURRENT PROBLEM, REHAB EVAL
71 yo F with h/o diverticulitis and ileostomy, now s/p RLQ loop ileostomy taken down with side to side stapled anastomosis 1/28.

## 2021-01-29 ENCOUNTER — TRANSCRIPTION ENCOUNTER (OUTPATIENT)
Age: 71
End: 2021-01-29

## 2021-01-29 LAB
ANION GAP SERPL CALC-SCNC: 10 MMOL/L — SIGNIFICANT CHANGE UP (ref 5–17)
BASOPHILS # BLD AUTO: 0 K/UL — SIGNIFICANT CHANGE UP (ref 0–0.2)
BASOPHILS NFR BLD AUTO: 0 % — SIGNIFICANT CHANGE UP (ref 0–2)
BUN SERPL-MCNC: 11 MG/DL — SIGNIFICANT CHANGE UP (ref 7–23)
CALCIUM SERPL-MCNC: 9.1 MG/DL — SIGNIFICANT CHANGE UP (ref 8.4–10.5)
CHLORIDE SERPL-SCNC: 94 MMOL/L — LOW (ref 96–108)
CO2 SERPL-SCNC: 26 MMOL/L — SIGNIFICANT CHANGE UP (ref 22–31)
CREAT SERPL-MCNC: 0.59 MG/DL — SIGNIFICANT CHANGE UP (ref 0.5–1.3)
EOSINOPHIL # BLD AUTO: 0 K/UL — SIGNIFICANT CHANGE UP (ref 0–0.5)
EOSINOPHIL NFR BLD AUTO: 0 % — SIGNIFICANT CHANGE UP (ref 0–6)
GLUCOSE SERPL-MCNC: 84 MG/DL — SIGNIFICANT CHANGE UP (ref 70–99)
HCT VFR BLD CALC: 28.2 % — LOW (ref 34.5–45)
HGB BLD-MCNC: 8.8 G/DL — LOW (ref 11.5–15.5)
LYMPHOCYTES # BLD AUTO: 0.61 K/UL — LOW (ref 1–3.3)
LYMPHOCYTES # BLD AUTO: 6.2 % — LOW (ref 13–44)
MAGNESIUM SERPL-MCNC: 2 MG/DL — SIGNIFICANT CHANGE UP (ref 1.6–2.6)
MCHC RBC-ENTMCNC: 18.6 PG — LOW (ref 27–34)
MCHC RBC-ENTMCNC: 31.2 GM/DL — LOW (ref 32–36)
MCV RBC AUTO: 59.7 FL — LOW (ref 80–100)
MONOCYTES # BLD AUTO: 0.61 K/UL — SIGNIFICANT CHANGE UP (ref 0–0.9)
MONOCYTES NFR BLD AUTO: 6.2 % — SIGNIFICANT CHANGE UP (ref 2–14)
NEUTROPHILS # BLD AUTO: 8.49 K/UL — HIGH (ref 1.8–7.4)
NEUTROPHILS NFR BLD AUTO: 85.8 % — HIGH (ref 43–77)
PHOSPHATE SERPL-MCNC: 3.6 MG/DL — SIGNIFICANT CHANGE UP (ref 2.5–4.5)
PLATELET # BLD AUTO: 347 K/UL — SIGNIFICANT CHANGE UP (ref 150–400)
POTASSIUM SERPL-MCNC: 4.2 MMOL/L — SIGNIFICANT CHANGE UP (ref 3.5–5.3)
POTASSIUM SERPL-SCNC: 4.2 MMOL/L — SIGNIFICANT CHANGE UP (ref 3.5–5.3)
RBC # BLD: 4.72 M/UL — SIGNIFICANT CHANGE UP (ref 3.8–5.2)
RBC # FLD: 16 % — HIGH (ref 10.3–14.5)
SODIUM SERPL-SCNC: 130 MMOL/L — LOW (ref 135–145)
WBC # BLD: 9.9 K/UL — SIGNIFICANT CHANGE UP (ref 3.8–10.5)
WBC # FLD AUTO: 9.9 K/UL — SIGNIFICANT CHANGE UP (ref 3.8–10.5)

## 2021-01-29 RX ORDER — IBUPROFEN 200 MG
400 TABLET ORAL EVERY 4 HOURS
Refills: 0 | Status: DISCONTINUED | OUTPATIENT
Start: 2021-01-29 | End: 2021-02-02

## 2021-01-29 RX ORDER — ACETAMINOPHEN 500 MG
1000 TABLET ORAL EVERY 6 HOURS
Refills: 0 | Status: DISCONTINUED | OUTPATIENT
Start: 2021-01-29 | End: 2021-02-02

## 2021-01-29 RX ORDER — MAGNESIUM OXIDE 400 MG ORAL TABLET 241.3 MG
1000 TABLET ORAL
Refills: 0 | Status: DISCONTINUED | OUTPATIENT
Start: 2021-01-29 | End: 2021-01-30

## 2021-01-29 RX ADMIN — Medication 400 MILLIGRAM(S): at 13:30

## 2021-01-29 RX ADMIN — Medication 15 MILLIGRAM(S): at 05:03

## 2021-01-29 RX ADMIN — ENOXAPARIN SODIUM 40 MILLIGRAM(S): 100 INJECTION SUBCUTANEOUS at 06:07

## 2021-01-29 RX ADMIN — MAGNESIUM OXIDE 400 MG ORAL TABLET 1000 MILLIGRAM(S): 241.3 TABLET ORAL at 11:10

## 2021-01-29 RX ADMIN — Medication 1000 MILLIGRAM(S): at 22:47

## 2021-01-29 RX ADMIN — Medication 400 MILLIGRAM(S): at 11:11

## 2021-01-29 RX ADMIN — Medication 400 MILLIGRAM(S): at 19:45

## 2021-01-29 RX ADMIN — Medication 1000 MILLIGRAM(S): at 18:38

## 2021-01-29 RX ADMIN — Medication 400 MILLIGRAM(S): at 06:07

## 2021-01-29 NOTE — DISCHARGE NOTE PROVIDER - NSDCCPTREATMENT_GEN_ALL_CORE_FT
PRINCIPAL PROCEDURE  Procedure: Closure of loop ileostomy  Findings and Treatment: WOUND CARE: You may shower. Pat Dry abdomen. dry gazue and tape to abdomen  BATHING: Please do not submerge wound underwater. You may shower and/or sponge bathe.  ACTIVITY: No heavy lifting anything more than 10-15lbs or straining. Otherwise, you may return to your usual level of physical activity. If you are taking narcotic pain medication (such as Percocet), do NOT drive a car, operate machinery or make important decisions.  DIET: Low fiber diet  NOTIFY YOUR SURGEON IF: You have any bleeding that does not stop, any pus draining from your wound, any fever (over 100.4 F) or chills, persistent nausea/vomiting with inability to tolerate food or liquids, persistent diarrhea, or if your pain is not controlled on your discharge pain medications.  FOLLOW-UP:  1. Please call to make a follow-up appointment within two weeks of discharge   2. Please follow up with your primary care physician in one week regarding your hospitalization.

## 2021-01-29 NOTE — DISCHARGE NOTE PROVIDER - INSTRUCTIONS
Low Fiber Diet: Avoid raw fruits and vegetables especially ones with thick skin and salad. Thoroughly cooked vegetables that are soft and easily mashed with a fork are okay to eat. Bananas are also ok to eat.

## 2021-01-29 NOTE — DISCHARGE NOTE PROVIDER - DETAILS OF MALNUTRITION DIAGNOSIS/DIAGNOSES
This patient has been assessed with a concern for Malnutrition and was treated during this hospitalization for the following Nutrition diagnosis/diagnoses:     -  01/29/2021: Moderate protein-calorie malnutrition   This patient has been assessed with a concern for Malnutrition and was treated during this hospitalization for the following Nutrition diagnosis/diagnoses:     -  01/29/2021: Moderate protein-calorie malnutrition    This patient has been assessed with a concern for Malnutrition and was treated during this hospitalization for the following Nutrition diagnosis/diagnoses:     -  01/29/2021: Moderate protein-calorie malnutrition   This patient has been assessed with a concern for Malnutrition and was treated during this hospitalization for the following Nutrition diagnosis/diagnoses:     -  01/29/2021: Moderate protein-calorie malnutrition    This patient has been assessed with a concern for Malnutrition and was treated during this hospitalization for the following Nutrition diagnosis/diagnoses:     -  01/29/2021: Moderate protein-calorie malnutrition    This patient has been assessed with a concern for Malnutrition and was treated during this hospitalization for the following Nutrition diagnosis/diagnoses:     -  01/29/2021: Moderate protein-calorie malnutrition

## 2021-01-29 NOTE — DISCHARGE NOTE PROVIDER - NSDCCPCAREPLAN_GEN_ALL_CORE_FT
PRINCIPAL DISCHARGE DIAGNOSIS  Diagnosis: Diverticulitis  Assessment and Plan of Treatment: s/p ileostomy reversal

## 2021-01-29 NOTE — DIETITIAN INITIAL EVALUATION ADULT. - ORAL INTAKE PTA/DIET HISTORY
Pt stated PO intake and appetite was good PTA. Reported follows low fiber diet. Pt stated she would like to get a review about low fiber diet. Pt takes vitamin C, Vitamin D, multivitamin PTA. Pt stated does takes any nutrition supplement PTA. No difficulties chewing or swallowing reported PTA. NKFA reported. Pt stated PO intake and appetite was fair PTA. Reported follows low fiber diet. Pt stated she would like to get a review about low fiber diet. Pt takes vitamin C, Vitamin D, multivitamin PTA. Pt stated does takes any nutrition supplement PTA. No difficulties chewing or swallowing reported PTA. NKFA reported. Pt stated PO intake and appetite was fair PTA. Reported follows low fiber diet since November . Pt stated she would like to get a review about low fiber diet. Pt takes vitamin C, Vitamin D, multivitamin PTA. Pt stated does not takes any nutrition supplement PTA. No difficulties chewing or swallowing reported PTA. NKFA reported.

## 2021-01-29 NOTE — DISCHARGE NOTE PROVIDER - NSDCMRMEDTOKEN_GEN_ALL_CORE_FT
aspirin 81 mg oral tablet, chewable: 1 tab(s) orally once a day  carvedilol 6.25 mg oral tablet: 1 tab(s) orally 2 times a day  Multiple Vitamins oral tablet: 1 tab(s) orally once a day  olmesartan 40 mg oral tablet: 1 tab(s) orally once a day   aspirin 81 mg oral tablet, chewable: 1 tab(s) orally once a day  carvedilol 6.25 mg oral tablet: 1 tab(s) orally 2 times a day  Multiple Vitamins oral tablet: 1 tab(s) orally once a day  olmesartan 40 mg oral tablet: 1 tab(s) orally once a day  oxyCODONE 5 mg oral tablet: 1 tab(s) orally 1 to 3 times a day, As Needed -Moderate Pain (4 - 6) MDD:3 tabs

## 2021-01-29 NOTE — DIETITIAN INITIAL EVALUATION ADULT. - PERTINENT LABORATORY DATA
Sodium 130 <L> (1/29/21)  Chloride, serum - 94 <L> (1/29/21) Sodium 130 <L> (1/29/21)  Chloride, serum - 94 <L> (1/29/21)  POCT 146 (1/28/21)  A1C 5.4 (1/14/21)

## 2021-01-29 NOTE — DIETITIAN INITIAL EVALUATION ADULT. - OTHER INFO
Pt stated  pounds. As per [facility] weight history  174 pounds (06/20), 164 pounds (9/20), 162 pounds (10/20), 152 pounds (11/20), 153 pounds (11/20), 151 pounds (1/29). Pt stated  pounds. As per [facility] weight history  174 pounds (06/20), 164 pounds (9/20), 162 pounds (10/20), 152 pounds (11/20), 153 pounds (11/20), 151 pounds (1/28). 13.21% weight loss since (6/20). Indication Significant weight loss with in last 7 months. Pt stated she lost weight because of her Diverticulitis.     Pt stated PO intake and appetite is fair in-house. Ate >50% of meals. Receiving Ensure clear x3/day. No difficulties chewing or swallowing reported. No nausea/vomiting. Pt stated last bowel movement was on 1/29/21, reported it was watery . Reports gas.     Provided written and verbal fiber restricted  nutrition therapy diet education. Discussed about consume 5-6 small meals every 3 -4 hours daily. Discussed  importance of avoiding  fiber rich foods, avoid acidic, spicy, fried, greasy and high-fat food, raw fruits/vegetables, whole grains, and added fiber in processed foods. Discussed chewing foods well and drink adequate water for hydration and consume adequate protein intake. Discussed slow reintroduction of fiber back into diet once cleared by provide. Discussed about recommend food. Pt verbalized understanding and accepted written materials. Pt able to teach back 2 points of nutrition education provided. Patient with no nutrition-related questions at this time. Pt made aware RD and dietetic intern remains available as needed. Pt stated  pounds. As per [facility] weight history  174 pounds (06/20), 164 pounds (9/20), 162 pounds (10/20), 152 pounds (11/20), 153 pounds (11/20), 151 pounds (1/28). 13.21% weight loss since (6/20). Indication Significant weight loss with in last 7 months. Pt stated she lost weight because of her Diverticulitis.     Pt stated PO intake and appetite is fair in-house. Ate >50% of meals. Receiving Ensure clear x3/day, Pt stated does not like the taste of Ensure clear. No difficulties chewing or swallowing reported. No nausea/vomiting. Pt stated last bowel movement was on 1/29/21, reported it was watery . Reports gas.     Provided written and verbal fiber restricted  nutrition therapy diet education. Discussed about consume 5-6 small meals every 3 -4 hours daily. Discussed  importance of avoiding  fiber rich foods, avoid acidic, spicy, fried, greasy and high-fat food, raw fruits/vegetables, whole grains, and added fiber in processed foods. Discussed chewing foods well and drink adequate water for hydration and consume adequate protein intake. Discussed slow reintroduction of fiber back into diet once cleared by provide. Discussed about recommend food. RD and Dietetic intern offered high protein apple juice x1 daily. Pt is amenable to try high protein apple juice. Pt verbalized understanding and accepted written materials. Pt able to teach back 2 points of nutrition education provided. Patient with no nutrition-related questions at this time. Pt made aware RD and dietetic intern remains available as needed. Pt stated  pounds. As per [facility] weight history  174 pounds (06/20), 164 pounds (9/20), 162 pounds (10/20), 152 pounds (11/20), 153 pounds (11/20), 151 pounds (1/28). Dosing weight as per flowsheet 151.5 pounds . 13.21% weight loss since (6/20). Indicating Significant weight loss with in last 7 months. Pt stated she lost weight because of her Diverticulitis and decreased PO intake.     Pt stated PO intake and appetite is fair in-house. Ate >50% of meals. Receiving Ensure clear x3/day, Pt stated does not like the taste of Ensure clear. willing to try berry flavor ensure. will update preferences. No difficulties chewing or swallowing reported. No nausea/vomiting. Pt stated last bowel movement was on 1/29/21, reported it was watery . Reports gas.     Provided written and verbal fiber restricted  nutrition therapy diet education. Discussed about consume 5-6 small meals every 3 -4 hours daily. Discussed  importance of avoiding  fiber rich foods, avoid acidic, spicy, fried, greasy and high-fat food, raw fruits/vegetables, whole grains, and added fiber in processed foods. Discussed chewing foods well and drink adequate water for hydration and consume adequate protein intake. Discussed slow reintroduction of fiber back into diet once cleared by provide. Discussed about recommend food. RD and Dietetic intern offered high protein apple juice x1 daily. Pt is amenable to try high protein apple juice. Pt verbalized understanding and accepted written materials. Pt able to teach back 2 points of nutrition education provided. Patient with no nutrition-related questions at this time. Pt made aware RD and dietetic intern remains available as needed.

## 2021-01-29 NOTE — DIETITIAN INITIAL EVALUATION ADULT. - ETIOLOGY
in setting of chronic illness related to physiological causes increasing nutrition needs, decreased appetite in setting of chronic illness related to physiological causes increasing nutrition needs, decreased appetite PTA altered GI function related to diverticulitis , decreased appetite PTA

## 2021-01-29 NOTE — PROGRESS NOTE ADULT - ASSESSMENT
Assessment: 70 yr old female with PMH HTN, Breast Ca (with radiation), Thalassemia minor, s/p right breast lumpectomy, diverticulitis. Due to complicated diverticulitis, Pt had Open anterior resection, right salpingectomy oophorectomy & D&C on 11/10/20. Pt is now POD 1 s/p closure of loop ileostomy, recovering appropriately.     - ERP protocol  - diet advanced to LRD   - D/C IVF after 600 cc of fluid intake  - Vallejo catheter to be discontinued at 10 am   - pain control with IV medications (Tylenol, Toradol), ordered for transition to PO later today  - monitor for bowel function   - f/u AM labs  - magnesium oxide   - DVT ppx   - PT evaluation, continue with OOB      Red surgery [id number] Assessment: 70 yr old female with PMH HTN, Breast Ca (with radiation), Thalassemia minor, s/p right breast lumpectomy, diverticulitis. Due to complicated diverticulitis, Pt had Open anterior resection, right salpingectomy oophorectomy & D&C on 11/10/20. Pt is now POD 1 s/p closure of loop ileostomy, recovering appropriately.     - ERP protocol  - diet advanced to LRD   - D/C IVF after 600 cc of fluid intake  - Vallejo catheter to be discontinued at 10 am   - pain control with IV medications (Tylenol, Toradol), ordered for transition to PO later today  - monitor for bowel function   - f/u AM labs  - magnesium oxide   - DVT ppx   - PT evaluation, continue with OOB - Caprini 6, will discuss outpatient VTE ppx with Dr. Marx    [facility] surgery [id number]

## 2021-01-29 NOTE — DISCHARGE NOTE PROVIDER - CARE PROVIDER_API CALL
Diony Marx)  ColonRectal Surgery; Surgery  [address], [address]  [place] [address]  Phone: [phone]  Fax: [phone]  Follow Up Time: 1 week

## 2021-01-29 NOTE — DIETITIAN INITIAL EVALUATION ADULT. - ADD RECOMMEND
1) Diet, low fiber- supplement feeding modality: oral. Ensure clear cans or serving per day : 1, Frequency: three times per day.2) Fiber-restricted nutrition therapy diet education provided , reinforce diet education as needed. 3) Monitor PO intake, weight, labs, skin, GI status, diet 1) Change Diet to - diet low fiber- supplement feeding modality: oral. Ensure clear (berry flavour) cans or serving per day : 1, Frequency: three times per day. 2) Provide high protein apple juice [id number]. 3) Fiber-restricted nutrition therapy diet education provided , reinforce diet education as needed. 3) Monitor PO intake, weight, labs, skin, GI status, diet 1) Continue  low fiber Diet with -Ensure clear x3 /day 2) Update berry flavor  ensure x3/day 3) Provide high protein apple juice [id number]. 4) Fiber-restricted nutrition therapy diet education provided , reinforce diet education as needed. 5) Monitor PO intake, weight, labs, skin, GI status, diet6) Malnutrition notification placed in chart, and provider made aware of it.

## 2021-01-29 NOTE — DIETITIAN NUTRITION RISK NOTIFICATION - TREATMENT: THE FOLLOWING DIET HAS BEEN RECOMMENDED
Diet, Low Fiber:   Supplement Feeding Modality:  Oral  Ensure Clear Cans or Servings Per Day:  1       Frequency:  Three Times a day (01-29-21 @ 08:13) [Active]

## 2021-01-29 NOTE — DIETITIAN INITIAL EVALUATION ADULT. - CHIEF COMPLAINT
70 yr old female PMH HTN, Breast Cancer, Thalassemia minor, MVP, diverticulitis. Reason for Admission- loop ileostomy closure. Now POD #1 s/p closure of loop ileostomy.

## 2021-01-29 NOTE — DISCHARGE NOTE PROVIDER - HOSPITAL COURSE
70 yr old female with PMH HTN, Breast Ca (with radiation), Thalassemia minor, s/p right breast lumpectomy, diverticulitis. Due to complicated diverticulitis, CT scan revealed significant chronic, smoldering diverticulitis with some extraluminal air droplets and possible pericolonic fluid collections, and enlarged uterus. Pt had Open anterior resection, right salpingectomy oophorectomy & D&C on 11/10/20. and now for closure of loop ileostomy on 1/28/21.    She presented for scheduled surgery. On 1/28 patient underwent closure of loop ileostomy. The patient tolerated the procedure well (see operative report for full details). She received intrathecal morphine for pain control. She was placed on IV tylenol and toradol for 24 hours.  Pt was given clear liquids with ensure clears in PACU and encouraged with early ambulation.  Vallejo was removed on POD #1. She began voiding well on her own.  Once IV pain control dosing complete, she was transitioned to oral tylenol and motrin with oxycodone for breakthrough pain.  Once she tolerated 600cc of clear liquids, her diet was advanced to low fiber.  Her caprini score was only 6 but given her cancer diagnosis, she was prescribed Eliquis DVT prophylaxis for 30 days.  On day of discharge, the patient was tolerating diet, ambulating well and pain controlled. She will follow up with Dr. Marx in 1 week. INCOMPLETE   70 yr old female with PMH HTN, breast cancer (with radiation), thalassemia minor, status post  right breast lumpectomy, diverticulitis. Due to complicated diverticulitis--and an enlarged uterus discovered on CT scan--the patient underwent an open low anterior resection with loop ileostomy, right salpingectomy oophorectomy, and D&C on 11/10/2020.     She returned on the current admission for scheduled closure of the loop ileostomy on 1/28/2021. The patient tolerated the procedure well (see operative report for full details). She received intrathecal morphine for pain control. She was placed on IV tylenol and toradol for 24 hours.  Pt was given clear liquids with ensure clears in PACU and encouraged with early ambulation.  Vallejo was removed on POD #1. She began voiding well on her own.  Once IV pain control dosing complete, she was transitioned to oral tylenol and motrin with oxycodone for breakthrough pain.  Once she tolerated 600cc of clear liquids, her diet was advanced to low fiber diet.  On POD #2, the patient had full return of bowel function, but her bowel movements contained old blood. The patient was transfused 2 units of packed red blood cells for a hemoglobin <7. Throughout, she remained normotensive without tachycardia. Her Caprini score was calculated at 6; therefore, she was not prescribed anticoagulation for DVT prophylaxis.  On day of discharge, the patient was tolerating diet, ambulating well and pain controlled. She will follow up with Dr. Marx in 1 week.

## 2021-01-29 NOTE — PROVIDER CONTACT NOTE (OTHER) - ASSESSMENT
Pt has been having episodes of loose, dark red BM's since surgery. VSS. Pt denies c/o pain or discomfort.

## 2021-01-29 NOTE — DIETITIAN INITIAL EVALUATION ADULT. - PHYSCIAL ASSESSMENT
well nourished nutrition focused physical exam performed with pt consent nutrition focused physical exam performed with pt consent and RD present. Skin intact , surgical incision present as per flowsheet.

## 2021-01-29 NOTE — DIETITIAN INITIAL EVALUATION ADULT. - SIGNS/SYMPTOMS
as evidence by weight loss 13.21% x7 months, pt verbalized decreased PO intake as evidence by weight loss 13.21% x7 months, pt verbalized decreased PO intake PTA as evidence by weight loss 13.21% x7 months. Moderate fat and muscle loss.

## 2021-01-29 NOTE — DISCHARGE NOTE PROVIDER - NSDCFUADDINST_GEN_ALL_CORE_FT
Oxycodone has been sent to your home pharmacy for pain control; no driving while taking narcotic pain medication. You may also take Tylenol over the counter. The maximum dose of Tylenol for 24 hours is 4000 mg. Please do not exceed that amount.

## 2021-01-30 LAB
ANION GAP SERPL CALC-SCNC: 7 MMOL/L — SIGNIFICANT CHANGE UP (ref 5–17)
BLD GP AB SCN SERPL QL: NEGATIVE — SIGNIFICANT CHANGE UP
BUN SERPL-MCNC: 13 MG/DL — SIGNIFICANT CHANGE UP (ref 7–23)
CALCIUM SERPL-MCNC: 8.3 MG/DL — LOW (ref 8.4–10.5)
CHLORIDE SERPL-SCNC: 100 MMOL/L — SIGNIFICANT CHANGE UP (ref 96–108)
CO2 SERPL-SCNC: 29 MMOL/L — SIGNIFICANT CHANGE UP (ref 22–31)
CREAT SERPL-MCNC: 0.6 MG/DL — SIGNIFICANT CHANGE UP (ref 0.5–1.3)
GLUCOSE SERPL-MCNC: 72 MG/DL — SIGNIFICANT CHANGE UP (ref 70–99)
HCT VFR BLD CALC: 20.2 % — CRITICAL LOW (ref 34.5–45)
HCT VFR BLD CALC: 21.5 % — LOW (ref 34.5–45)
HCT VFR BLD CALC: 22.3 % — LOW (ref 34.5–45)
HCT VFR BLD CALC: 24.8 % — LOW (ref 34.5–45)
HGB BLD-MCNC: 6.4 G/DL — CRITICAL LOW (ref 11.5–15.5)
HGB BLD-MCNC: 6.9 G/DL — CRITICAL LOW (ref 11.5–15.5)
HGB BLD-MCNC: 6.9 G/DL — CRITICAL LOW (ref 11.5–15.5)
HGB BLD-MCNC: 7.9 G/DL — LOW (ref 11.5–15.5)
MAGNESIUM SERPL-MCNC: 1.9 MG/DL — SIGNIFICANT CHANGE UP (ref 1.6–2.6)
MCHC RBC-ENTMCNC: 18.8 PG — LOW (ref 27–34)
MCHC RBC-ENTMCNC: 19 PG — LOW (ref 27–34)
MCHC RBC-ENTMCNC: 19.1 PG — LOW (ref 27–34)
MCHC RBC-ENTMCNC: 19.2 PG — LOW (ref 27–34)
MCHC RBC-ENTMCNC: 30.9 GM/DL — LOW (ref 32–36)
MCHC RBC-ENTMCNC: 31.7 GM/DL — LOW (ref 32–36)
MCHC RBC-ENTMCNC: 31.9 GM/DL — LOW (ref 32–36)
MCHC RBC-ENTMCNC: 32.1 GM/DL — SIGNIFICANT CHANGE UP (ref 32–36)
MCV RBC AUTO: 59.4 FL — LOW (ref 80–100)
MCV RBC AUTO: 59.9 FL — LOW (ref 80–100)
MCV RBC AUTO: 60.2 FL — LOW (ref 80–100)
MCV RBC AUTO: 60.6 FL — LOW (ref 80–100)
NRBC # BLD: 0 /100 WBCS — SIGNIFICANT CHANGE UP (ref 0–0)
PHOSPHATE SERPL-MCNC: 2.6 MG/DL — SIGNIFICANT CHANGE UP (ref 2.5–4.5)
PLATELET # BLD AUTO: 290 K/UL — SIGNIFICANT CHANGE UP (ref 150–400)
PLATELET # BLD AUTO: 299 K/UL — SIGNIFICANT CHANGE UP (ref 150–400)
PLATELET # BLD AUTO: 312 K/UL — SIGNIFICANT CHANGE UP (ref 150–400)
PLATELET # BLD AUTO: 337 K/UL — SIGNIFICANT CHANGE UP (ref 150–400)
POTASSIUM SERPL-MCNC: 4 MMOL/L — SIGNIFICANT CHANGE UP (ref 3.5–5.3)
POTASSIUM SERPL-SCNC: 4 MMOL/L — SIGNIFICANT CHANGE UP (ref 3.5–5.3)
RBC # BLD: 3.37 M/UL — LOW (ref 3.8–5.2)
RBC # BLD: 3.62 M/UL — LOW (ref 3.8–5.2)
RBC # BLD: 3.68 M/UL — LOW (ref 3.8–5.2)
RBC # BLD: 4.12 M/UL — SIGNIFICANT CHANGE UP (ref 3.8–5.2)
RBC # FLD: 15.8 % — HIGH (ref 10.3–14.5)
RBC # FLD: 15.9 % — HIGH (ref 10.3–14.5)
RBC # FLD: 16 % — HIGH (ref 10.3–14.5)
RBC # FLD: 16.2 % — HIGH (ref 10.3–14.5)
RH IG SCN BLD-IMP: POSITIVE — SIGNIFICANT CHANGE UP
SODIUM SERPL-SCNC: 136 MMOL/L — SIGNIFICANT CHANGE UP (ref 135–145)
WBC # BLD: 6.25 K/UL — SIGNIFICANT CHANGE UP (ref 3.8–10.5)
WBC # BLD: 6.97 K/UL — SIGNIFICANT CHANGE UP (ref 3.8–10.5)
WBC # BLD: 7.05 K/UL — SIGNIFICANT CHANGE UP (ref 3.8–10.5)
WBC # BLD: 7.89 K/UL — SIGNIFICANT CHANGE UP (ref 3.8–10.5)
WBC # FLD AUTO: 6.25 K/UL — SIGNIFICANT CHANGE UP (ref 3.8–10.5)
WBC # FLD AUTO: 6.97 K/UL — SIGNIFICANT CHANGE UP (ref 3.8–10.5)
WBC # FLD AUTO: 7.05 K/UL — SIGNIFICANT CHANGE UP (ref 3.8–10.5)
WBC # FLD AUTO: 7.89 K/UL — SIGNIFICANT CHANGE UP (ref 3.8–10.5)

## 2021-01-30 RX ADMIN — Medication 400 MILLIGRAM(S): at 16:38

## 2021-01-30 RX ADMIN — Medication 400 MILLIGRAM(S): at 08:48

## 2021-01-30 RX ADMIN — Medication 62.5 MILLIMOLE(S): at 14:59

## 2021-01-30 RX ADMIN — Medication 400 MILLIGRAM(S): at 20:01

## 2021-01-30 RX ADMIN — Medication 1000 MILLIGRAM(S): at 05:37

## 2021-01-30 RX ADMIN — Medication 400 MILLIGRAM(S): at 03:24

## 2021-01-30 RX ADMIN — Medication 1000 MILLIGRAM(S): at 23:40

## 2021-01-30 RX ADMIN — ENOXAPARIN SODIUM 40 MILLIGRAM(S): 100 INJECTION SUBCUTANEOUS at 05:37

## 2021-01-30 RX ADMIN — Medication 1000 MILLIGRAM(S): at 14:58

## 2021-01-30 RX ADMIN — Medication 400 MILLIGRAM(S): at 00:17

## 2021-01-30 NOTE — PROGRESS NOTE ADULT - ASSESSMENT
70 yr old female with PMH HTN, Breast Ca (with radiation), Thalassemia minor, s/p right breast lumpectomy, diverticulitis. Due to complicated diverticulitis, Pt had Open anterior resection, right salpingectomy oophorectomy & D&C on 11/10/20. Pt is now POD 2 s/p closure of loop ileostomy, recovering appropriately.     Plan:  - ERP protocol  - LRD  - Pain control with PO medications  - Monitor for bowel function   - F/u AM labs  - Magnesium oxide   - DVT ppx   - PT evaluation (home with no needs), continue with OOB - Caprini 6, will discuss outpatient VTE ppx with Dr. Marx    Red surgery   [id number] 70 yr old female with PMH HTN, Breast Ca (with radiation), Thalassemia minor, s/p right breast lumpectomy, diverticulitis. Due to complicated diverticulitis, Pt had Open anterior resection, right salpingectomy oophorectomy & D&C on 11/10/20. Pt is now POD 2 s/p closure of loop ileostomy, recovering appropriately.     Plan:  - ERP protocol  - LRD  - Pain control with PO medications  - Monitor for bowel function   - F/u AM labs  - Magnesium oxide   - DVT ppx   - PT evaluation (home with no needs), continue with OOB - Caprini 6, will discuss outpatient VTE ppx with Dr. Marx  - Dispo home today likely    Red surgery   [id number] 70 yr old female with PMH HTN, Breast Ca (with radiation), Thalassemia minor, s/p right breast lumpectomy, diverticulitis. Due to complicated diverticulitis, Pt had Open anterior resection, right salpingectomy oophorectomy & D&C on 11/10/20. Pt is now POD 2 s/p closure of loop ileostomy, recovering appropriately.     Plan:  - ERP protocol  - LRD  - Pain control with PO medications  - Monitor for bowel function   - F/u AM labs, repeat CBC  - Magnesium oxide   - DVT ppx   - PT evaluation (home with no needs), continue with OOB - Caprini 6, will discuss outpatient VTE ppx with Dr. Marx  - Dispo home today likely    Red surgery   [id number] 70 yr old female with PMH HTN, Breast Ca (with radiation), Thalassemia minor, s/p right breast lumpectomy, diverticulitis. Due to complicated diverticulitis, Pt had Open anterior resection, right salpingectomy oophorectomy & D&C on 11/10/20. Pt is now POD 2 s/p closure of loop ileostomy, recovering appropriately.     Plan:  - ERP protocol  - LRD  - Pain control with PO medications  - Monitor BMs for blood   - F/u AM labs, repeat CBC  - Magnesium oxide   - DVT ppx   - PT evaluation (home with no needs), continue with OOB  - Caprini 6, no need for Outpatient DVT ppx  - Dispo home today likely    Red surgery   [id number]

## 2021-01-31 LAB
ANION GAP SERPL CALC-SCNC: 8 MMOL/L — SIGNIFICANT CHANGE UP (ref 5–17)
BUN SERPL-MCNC: 7 MG/DL — SIGNIFICANT CHANGE UP (ref 7–23)
CALCIUM SERPL-MCNC: 8.2 MG/DL — LOW (ref 8.4–10.5)
CHLORIDE SERPL-SCNC: 103 MMOL/L — SIGNIFICANT CHANGE UP (ref 96–108)
CO2 SERPL-SCNC: 26 MMOL/L — SIGNIFICANT CHANGE UP (ref 22–31)
CREAT SERPL-MCNC: 0.55 MG/DL — SIGNIFICANT CHANGE UP (ref 0.5–1.3)
GLUCOSE SERPL-MCNC: 85 MG/DL — SIGNIFICANT CHANGE UP (ref 70–99)
HCT VFR BLD CALC: 26.4 % — LOW (ref 34.5–45)
HCT VFR BLD CALC: 26.7 % — LOW (ref 34.5–45)
HGB BLD-MCNC: 8.6 G/DL — LOW (ref 11.5–15.5)
HGB BLD-MCNC: 8.7 G/DL — LOW (ref 11.5–15.5)
MAGNESIUM SERPL-MCNC: 1.7 MG/DL — SIGNIFICANT CHANGE UP (ref 1.6–2.6)
MCHC RBC-ENTMCNC: 21.2 PG — LOW (ref 27–34)
MCHC RBC-ENTMCNC: 21.2 PG — LOW (ref 27–34)
MCHC RBC-ENTMCNC: 32.6 GM/DL — SIGNIFICANT CHANGE UP (ref 32–36)
MCHC RBC-ENTMCNC: 32.6 GM/DL — SIGNIFICANT CHANGE UP (ref 32–36)
MCV RBC AUTO: 65 FL — LOW (ref 80–100)
MCV RBC AUTO: 65.2 FL — LOW (ref 80–100)
NRBC # BLD: 0 /100 WBCS — SIGNIFICANT CHANGE UP (ref 0–0)
NRBC # BLD: 0 /100 WBCS — SIGNIFICANT CHANGE UP (ref 0–0)
PHOSPHATE SERPL-MCNC: 3.3 MG/DL — SIGNIFICANT CHANGE UP (ref 2.5–4.5)
PLATELET # BLD AUTO: 260 K/UL — SIGNIFICANT CHANGE UP (ref 150–400)
PLATELET # BLD AUTO: 278 K/UL — SIGNIFICANT CHANGE UP (ref 150–400)
POTASSIUM SERPL-MCNC: 3.7 MMOL/L — SIGNIFICANT CHANGE UP (ref 3.5–5.3)
POTASSIUM SERPL-SCNC: 3.7 MMOL/L — SIGNIFICANT CHANGE UP (ref 3.5–5.3)
RBC # BLD: 4.05 M/UL — SIGNIFICANT CHANGE UP (ref 3.8–5.2)
RBC # BLD: 4.11 M/UL — SIGNIFICANT CHANGE UP (ref 3.8–5.2)
RBC # FLD: 21.2 % — HIGH (ref 10.3–14.5)
RBC # FLD: 21.8 % — HIGH (ref 10.3–14.5)
SODIUM SERPL-SCNC: 137 MMOL/L — SIGNIFICANT CHANGE UP (ref 135–145)
WBC # BLD: 6.29 K/UL — SIGNIFICANT CHANGE UP (ref 3.8–10.5)
WBC # BLD: 8.64 K/UL — SIGNIFICANT CHANGE UP (ref 3.8–10.5)
WBC # FLD AUTO: 6.29 K/UL — SIGNIFICANT CHANGE UP (ref 3.8–10.5)
WBC # FLD AUTO: 8.64 K/UL — SIGNIFICANT CHANGE UP (ref 3.8–10.5)

## 2021-01-31 RX ORDER — POTASSIUM CHLORIDE 20 MEQ
20 PACKET (EA) ORAL
Refills: 0 | Status: COMPLETED | OUTPATIENT
Start: 2021-01-31 | End: 2021-01-31

## 2021-01-31 RX ORDER — MAGNESIUM SULFATE 500 MG/ML
2 VIAL (ML) INJECTION ONCE
Refills: 0 | Status: COMPLETED | OUTPATIENT
Start: 2021-01-31 | End: 2021-01-31

## 2021-01-31 RX ORDER — OXYCODONE HYDROCHLORIDE 5 MG/1
1 TABLET ORAL
Qty: 15 | Refills: 0
Start: 2021-01-31 | End: 2021-02-04

## 2021-01-31 RX ADMIN — Medication 400 MILLIGRAM(S): at 13:28

## 2021-01-31 RX ADMIN — Medication 1000 MILLIGRAM(S): at 17:11

## 2021-01-31 RX ADMIN — Medication 1000 MILLIGRAM(S): at 05:33

## 2021-01-31 RX ADMIN — Medication 50 GRAM(S): at 09:19

## 2021-01-31 RX ADMIN — Medication 400 MILLIGRAM(S): at 20:47

## 2021-01-31 RX ADMIN — Medication 1000 MILLIGRAM(S): at 11:03

## 2021-01-31 RX ADMIN — Medication 400 MILLIGRAM(S): at 09:15

## 2021-01-31 RX ADMIN — Medication 20 MILLIEQUIVALENT(S): at 09:15

## 2021-01-31 RX ADMIN — Medication 20 MILLIEQUIVALENT(S): at 11:03

## 2021-01-31 RX ADMIN — Medication 400 MILLIGRAM(S): at 04:22

## 2021-01-31 RX ADMIN — Medication 400 MILLIGRAM(S): at 01:52

## 2021-01-31 NOTE — PROGRESS NOTE ADULT - ASSESSMENT
A/P: 70 yr old female with PMH HTN, Breast Ca (with radiation), Thalassemia minor, s/p right breast lumpectomy, diverticulitis. Due to complicated diverticulitis, Pt had open low anterior resection, right salpingectomy oophorectomy & D&C on 11/10/20. Pt is now POD 3 s/p closure of loop ileostomy, recovering appropriately.     - Repeat CBC 6pm  - ERP protocol  - LRD  - Pain control with PO medications  - Monitor BMs for blood   - Magnesium oxide   - DVT ppx   - PT evaluation (home with no needs), continue with OOB  - Caprini 6, no need for Outpatient DVT ppx  - Dispo home today likely    Red surgery   [id number] A/P: 70 yr old female with PMH HTN, Breast Ca (with radiation), Thalassemia minor, s/p right breast lumpectomy, diverticulitis. Due to complicated diverticulitis, Pt had open low anterior resection, right salpingectomy oophorectomy & D&C on 11/10/20. Pt is now POD 3 s/p closure of loop ileostomy, recovering appropriately.     - Repeat CBC 6pm  - ERP protocol  - LRD  - Pain control with PO medications  - Monitor BMs for blood   - Magnesium oxide   - DVT ppx   - PT evaluation (home with no needs), continue with OOB  - Caprini 6, no need for Outpatient DVT ppx    Red surgery   [id number]

## 2021-01-31 NOTE — PROGRESS NOTE ADULT - ATTENDING COMMENTS
s/p ileostomy reversal  -likely staple line hemorrhage  -f/u Hct  -transfuse if decreasing hct  -will monitor closely  -cont dvt ppx
-LRD  -oob  -dvt ppx  -pain control  -monitor for bloody BM  -check hct  -dispo planning

## 2021-02-01 LAB
ANION GAP SERPL CALC-SCNC: 8 MMOL/L — SIGNIFICANT CHANGE UP (ref 5–17)
BUN SERPL-MCNC: 8 MG/DL — SIGNIFICANT CHANGE UP (ref 7–23)
CALCIUM SERPL-MCNC: 8.7 MG/DL — SIGNIFICANT CHANGE UP (ref 8.4–10.5)
CHLORIDE SERPL-SCNC: 102 MMOL/L — SIGNIFICANT CHANGE UP (ref 96–108)
CO2 SERPL-SCNC: 27 MMOL/L — SIGNIFICANT CHANGE UP (ref 22–31)
CREAT SERPL-MCNC: 0.6 MG/DL — SIGNIFICANT CHANGE UP (ref 0.5–1.3)
GLUCOSE SERPL-MCNC: 79 MG/DL — SIGNIFICANT CHANGE UP (ref 70–99)
HCT VFR BLD CALC: 26.4 % — LOW (ref 34.5–45)
HGB BLD-MCNC: 8.6 G/DL — LOW (ref 11.5–15.5)
MAGNESIUM SERPL-MCNC: 1.9 MG/DL — SIGNIFICANT CHANGE UP (ref 1.6–2.6)
MCHC RBC-ENTMCNC: 21.2 PG — LOW (ref 27–34)
MCHC RBC-ENTMCNC: 32.6 GM/DL — SIGNIFICANT CHANGE UP (ref 32–36)
MCV RBC AUTO: 65 FL — LOW (ref 80–100)
NRBC # BLD: 0 /100 WBCS — SIGNIFICANT CHANGE UP (ref 0–0)
PHOSPHATE SERPL-MCNC: 3.5 MG/DL — SIGNIFICANT CHANGE UP (ref 2.5–4.5)
PLATELET # BLD AUTO: 285 K/UL — SIGNIFICANT CHANGE UP (ref 150–400)
POTASSIUM SERPL-MCNC: 4 MMOL/L — SIGNIFICANT CHANGE UP (ref 3.5–5.3)
POTASSIUM SERPL-SCNC: 4 MMOL/L — SIGNIFICANT CHANGE UP (ref 3.5–5.3)
RBC # BLD: 4.06 M/UL — SIGNIFICANT CHANGE UP (ref 3.8–5.2)
RBC # FLD: 21.6 % — HIGH (ref 10.3–14.5)
SODIUM SERPL-SCNC: 137 MMOL/L — SIGNIFICANT CHANGE UP (ref 135–145)
WBC # BLD: 8.28 K/UL — SIGNIFICANT CHANGE UP (ref 3.8–10.5)
WBC # FLD AUTO: 8.28 K/UL — SIGNIFICANT CHANGE UP (ref 3.8–10.5)

## 2021-02-01 RX ORDER — LOSARTAN POTASSIUM 100 MG/1
100 TABLET, FILM COATED ORAL DAILY
Refills: 0 | Status: DISCONTINUED | OUTPATIENT
Start: 2021-02-01 | End: 2021-02-02

## 2021-02-01 RX ORDER — LOSARTAN POTASSIUM 100 MG/1
100 TABLET, FILM COATED ORAL DAILY
Refills: 0 | Status: DISCONTINUED | OUTPATIENT
Start: 2021-02-01 | End: 2021-02-01

## 2021-02-01 RX ORDER — MAGNESIUM SULFATE 500 MG/ML
2 VIAL (ML) INJECTION ONCE
Refills: 0 | Status: COMPLETED | OUTPATIENT
Start: 2021-02-01 | End: 2021-02-01

## 2021-02-01 RX ORDER — CARVEDILOL PHOSPHATE 80 MG/1
6.25 CAPSULE, EXTENDED RELEASE ORAL EVERY 12 HOURS
Refills: 0 | Status: DISCONTINUED | OUTPATIENT
Start: 2021-02-01 | End: 2021-02-02

## 2021-02-01 RX ADMIN — Medication 1000 MILLIGRAM(S): at 02:42

## 2021-02-01 RX ADMIN — Medication 1000 MILLIGRAM(S): at 14:20

## 2021-02-01 RX ADMIN — Medication 1000 MILLIGRAM(S): at 21:06

## 2021-02-01 RX ADMIN — Medication 400 MILLIGRAM(S): at 10:12

## 2021-02-01 RX ADMIN — CARVEDILOL PHOSPHATE 6.25 MILLIGRAM(S): 80 CAPSULE, EXTENDED RELEASE ORAL at 17:45

## 2021-02-01 RX ADMIN — Medication 50 GRAM(S): at 10:13

## 2021-02-01 RX ADMIN — LOSARTAN POTASSIUM 100 MILLIGRAM(S): 100 TABLET, FILM COATED ORAL at 14:20

## 2021-02-01 RX ADMIN — Medication 400 MILLIGRAM(S): at 17:45

## 2021-02-01 NOTE — PROGRESS NOTE ADULT - NUTRITIONAL ASSESSMENT
This patient has been assessed with a concern for Malnutrition and has been determined to have a diagnosis/diagnoses of Moderate protein-calorie malnutrition.    This patient is being managed with:   Diet Low Fiber-  Supplement Feeding Modality:  Oral  Ensure Clear Cans or Servings Per Day:  1       Frequency:  Three Times a day  Entered: Jan 29 2021  8:12AM    

## 2021-02-01 NOTE — PROGRESS NOTE ADULT - ASSESSMENT
A/P: 70 yr old female with PMH HTN, Breast Ca (with radiation), Thalassemia minor, s/p right breast lumpectomy, diverticulitis. Due to complicated diverticulitis, Pt had open low anterior resection, right salpingectomy oophorectomy & D&C on 11/10/20. Pt is now POD 4 s/p closure of loop ileostomy, recovering appropriately.     - ERP protocol  - LRD  - Pain control with PO medications  - Monitor BMs for blood   - Magnesium oxide   - DVT ppx  - PT evaluation (home with no needs), continue with OOB  - Caprini 6, no need for Outpatient DVT ppx  - home today, restart home meds    Red surgery   [id number]   A/P: 70 yr old female with PMH HTN, Breast Ca (with radiation), Thalassemia minor, s/p right breast lumpectomy, diverticulitis. Due to complicated diverticulitis, Pt had open low anterior resection, right salpingectomy oophorectomy & D&C on 11/10/20. Pt is now POD 4 s/p closure of loop ileostomy, recovering appropriately.     - ERP protocol  - LRD  - Pain control with PO medications  - Monitor BMs for blood   - DVT ppx  - PT evaluation (home with no needs), continue with OOB  - Caprini 6, no need for Outpatient DVT ppx  - home today, restart home meds    Red surgery   [id number]

## 2021-02-02 ENCOUNTER — TRANSCRIPTION ENCOUNTER (OUTPATIENT)
Age: 71
End: 2021-02-02

## 2021-02-02 VITALS
SYSTOLIC BLOOD PRESSURE: 125 MMHG | TEMPERATURE: 98 F | OXYGEN SATURATION: 96 % | RESPIRATION RATE: 18 BRPM | HEART RATE: 80 BPM | DIASTOLIC BLOOD PRESSURE: 74 MMHG

## 2021-02-02 LAB
ANION GAP SERPL CALC-SCNC: 9 MMOL/L — SIGNIFICANT CHANGE UP (ref 5–17)
BUN SERPL-MCNC: 10 MG/DL — SIGNIFICANT CHANGE UP (ref 7–23)
CALCIUM SERPL-MCNC: 8.7 MG/DL — SIGNIFICANT CHANGE UP (ref 8.4–10.5)
CHLORIDE SERPL-SCNC: 102 MMOL/L — SIGNIFICANT CHANGE UP (ref 96–108)
CO2 SERPL-SCNC: 27 MMOL/L — SIGNIFICANT CHANGE UP (ref 22–31)
CREAT SERPL-MCNC: 0.55 MG/DL — SIGNIFICANT CHANGE UP (ref 0.5–1.3)
GLUCOSE SERPL-MCNC: 85 MG/DL — SIGNIFICANT CHANGE UP (ref 70–99)
HCT VFR BLD CALC: 25.6 % — LOW (ref 34.5–45)
HGB BLD-MCNC: 8.2 G/DL — LOW (ref 11.5–15.5)
MAGNESIUM SERPL-MCNC: 2.1 MG/DL — SIGNIFICANT CHANGE UP (ref 1.6–2.6)
MCHC RBC-ENTMCNC: 20.8 PG — LOW (ref 27–34)
MCHC RBC-ENTMCNC: 32 GM/DL — SIGNIFICANT CHANGE UP (ref 32–36)
MCV RBC AUTO: 65 FL — LOW (ref 80–100)
NRBC # BLD: 0 /100 WBCS — SIGNIFICANT CHANGE UP (ref 0–0)
PHOSPHATE SERPL-MCNC: 4.1 MG/DL — SIGNIFICANT CHANGE UP (ref 2.5–4.5)
PLATELET # BLD AUTO: 312 K/UL — SIGNIFICANT CHANGE UP (ref 150–400)
POTASSIUM SERPL-MCNC: 4 MMOL/L — SIGNIFICANT CHANGE UP (ref 3.5–5.3)
POTASSIUM SERPL-SCNC: 4 MMOL/L — SIGNIFICANT CHANGE UP (ref 3.5–5.3)
RBC # BLD: 3.94 M/UL — SIGNIFICANT CHANGE UP (ref 3.8–5.2)
RBC # FLD: 22 % — HIGH (ref 10.3–14.5)
SODIUM SERPL-SCNC: 138 MMOL/L — SIGNIFICANT CHANGE UP (ref 135–145)
WBC # BLD: 6.32 K/UL — SIGNIFICANT CHANGE UP (ref 3.8–10.5)
WBC # FLD AUTO: 6.32 K/UL — SIGNIFICANT CHANGE UP (ref 3.8–10.5)

## 2021-02-02 PROCEDURE — 86901 BLOOD TYPING SEROLOGIC RH(D): CPT

## 2021-02-02 PROCEDURE — C9399: CPT

## 2021-02-02 PROCEDURE — P9016: CPT

## 2021-02-02 PROCEDURE — U0003: CPT

## 2021-02-02 PROCEDURE — C9803: CPT

## 2021-02-02 PROCEDURE — 85027 COMPLETE CBC AUTOMATED: CPT

## 2021-02-02 PROCEDURE — U0005: CPT

## 2021-02-02 PROCEDURE — 84100 ASSAY OF PHOSPHORUS: CPT

## 2021-02-02 PROCEDURE — 85025 COMPLETE CBC W/AUTO DIFF WBC: CPT

## 2021-02-02 PROCEDURE — 86900 BLOOD TYPING SEROLOGIC ABO: CPT

## 2021-02-02 PROCEDURE — 86923 COMPATIBILITY TEST ELECTRIC: CPT

## 2021-02-02 PROCEDURE — 36430 TRANSFUSION BLD/BLD COMPNT: CPT

## 2021-02-02 PROCEDURE — 80048 BASIC METABOLIC PNL TOTAL CA: CPT

## 2021-02-02 PROCEDURE — 97530 THERAPEUTIC ACTIVITIES: CPT

## 2021-02-02 PROCEDURE — 88304 TISSUE EXAM BY PATHOLOGIST: CPT

## 2021-02-02 PROCEDURE — 97161 PT EVAL LOW COMPLEX 20 MIN: CPT

## 2021-02-02 PROCEDURE — 97116 GAIT TRAINING THERAPY: CPT

## 2021-02-02 PROCEDURE — 83735 ASSAY OF MAGNESIUM: CPT

## 2021-02-02 PROCEDURE — 82962 GLUCOSE BLOOD TEST: CPT

## 2021-02-02 PROCEDURE — 86850 RBC ANTIBODY SCREEN: CPT

## 2021-02-02 PROCEDURE — C1889: CPT

## 2021-02-02 RX ADMIN — LOSARTAN POTASSIUM 100 MILLIGRAM(S): 100 TABLET, FILM COATED ORAL at 06:11

## 2021-02-02 RX ADMIN — Medication 1000 MILLIGRAM(S): at 11:07

## 2021-02-02 RX ADMIN — CARVEDILOL PHOSPHATE 6.25 MILLIGRAM(S): 80 CAPSULE, EXTENDED RELEASE ORAL at 06:11

## 2021-02-02 RX ADMIN — Medication 400 MILLIGRAM(S): at 06:11

## 2021-02-02 NOTE — DISCHARGE NOTE NURSING/CASE MANAGEMENT/SOCIAL WORK - PATIENT PORTAL LINK FT
You can access the FollowMyHealth Patient Portal offered by [facility] by registering at the following website: [email or web address]. By joining [email or web address]’s FollowMyHealth portal, you will also be able to view your health information using other applications (apps) compatible with our system.

## 2021-02-02 NOTE — PROGRESS NOTE ADULT - SUBJECTIVE AND OBJECTIVE BOX
Day 1 of Anesthesia Pain Management Service    SUBJECTIVE: Doing ok  Pain Scale Score:          [X] Refer to charted pain scores    THERAPY:    s/p   150  mcg PF morphine on 1\28\2021      MEDICATIONS  (STANDING):  acetaminophen  IVPB .. 1000 milliGRAM(s) IV Intermittent every 6 hours  cefoTEtan  IVPB 2 Gram(s) IV Intermittent once  enoxaparin Injectable 40 milliGRAM(s) SubCutaneous every 24 hours  lactated ringers. 1000 milliLiter(s) (40 mL/Hr) IV Continuous <Continuous>  morphine PF Spinal 0.15 milliGRAM(s) IntraThecal. once    MEDICATIONS  (PRN):  naloxone Injectable 0.1 milliGRAM(s) IV Push every 3 minutes PRN For ANY of the following changes in patient status:  A. RR LESS THAN 10 breaths per minute, B. Oxygen saturation LESS THAN 90%, C. Sedation score of 6  oxyCODONE    IR 5 milliGRAM(s) Oral every 4 hours PRN Moderate Pain (4 - 6)  oxyCODONE    IR 10 milliGRAM(s) Oral every 4 hours PRN Severe Pain (7 - 10)      OBJECTIVE:    Sedation:        	[X] Alert	 [ ] Drowsy	[ ] Arousable      [ ] Asleep       [ ] Unresponsive    Side Effects:	[X] None 	[ ] Nausea	[ ] Vomiting         [ ] Pruritus  		[ ] Weakness            [ ] Numbness	          [ ] Other:    Vital Signs Last 24 Hrs  T(C): 36.6 (29 Jan 2021 06:19), Max: 36.6 (28 Jan 2021 13:00)  T(F): 97.8 (29 Jan 2021 06:19), Max: 97.9 (28 Jan 2021 13:00)  HR: 61 (29 Jan 2021 06:19) (54 - 73)  BP: 130/65 (29 Jan 2021 06:19) (106/56 - 133/74)  BP(mean): 75 (28 Jan 2021 12:30) (75 - 89)  RR: 18 (29 Jan 2021 06:19) (16 - 18)  SpO2: 100% (29 Jan 2021 06:19) (97% - 100%)    ASSESSMENT/ PLAN  [X] Patient transitioned to prn analgesics  [X] Pain management per primary service, pain service to sign off   [X]Documentation and Verification of current medications
SURGERY DAILY PROGRESS NOTE:     SUBJECTIVE/ROS: Patient reports feeling well. +/+. No further bloody bms. Admits to loose stools, Voiding. Ambulating. Pain well controlled    MEDICATIONS  (STANDING):  acetaminophen   Tablet .. 1000 milliGRAM(s) Oral every 6 hours  carvedilol 6.25 milliGRAM(s) Oral every 12 hours  ibuprofen  Tablet. 400 milliGRAM(s) Oral every 4 hours  losartan 100 milliGRAM(s) Oral daily    MEDICATIONS  (PRN):  oxyCODONE    IR 5 milliGRAM(s) Oral every 4 hours PRN Moderate Pain (4 - 6)  oxyCODONE    IR 10 milliGRAM(s) Oral every 4 hours PRN Severe Pain (7 - 10)      OBJECTIVE:    Vital Signs Last 24 Hrs  T(C): 36.8 (02 Feb 2021 06:05), Max: 36.8 (01 Feb 2021 14:59)  T(F): 98.3 (02 Feb 2021 06:05), Max: 98.3 (02 Feb 2021 06:05)  HR: 70 (02 Feb 2021 06:05) (66 - 85)  BP: 136/80 (02 Feb 2021 06:05) (129/77 - 152/85)  BP(mean): --  RR: 18 (02 Feb 2021 06:05) (18 - 18)  SpO2: 95% (02 Feb 2021 06:05) (95% - 98%)        I&O's Detail    01 Feb 2021 07:01  -  02 Feb 2021 07:00  --------------------------------------------------------  IN:    IV PiggyBack: 50 mL    Oral Fluid: 680 mL  Total IN: 730 mL    OUT:    Voided (mL): 650 mL  Total OUT: 650 mL    Total NET: 80 mL          Daily     Daily     LABS:                        8.2    6.32  )-----------( 312      ( 02 Feb 2021 06:22 )             25.6     02-02    138  |  102  |  10  ----------------------------<  85  4.0   |  27  |  0.55    Ca    8.7      02 Feb 2021 06:22  Phos  4.1     02-02  Mg     2.1     02-02                    PHYSICAL EXAM:    General: NAD, resting comfortably in bed  Pulmonary: Nonlabored breathing, no respiratory distress  Cardiovascular: NSR  Abdominal: soft, NT/ND, incision with dressing with minimal s/s strikethrough, changed at bedside  Extremities: WWP      
Surgery Progress Note  Patient is a 70y old  Female who presents with a chief complaint of loop ileostomy closure (29 Jan 2021 15:40)      SUBJECTIVE: Patient seen and examined at bedside with surgical team, patient without complaints. Few blood-tinged BMs yesterday and o/n, asymptomatic, stable H/H on AM labs. Tolerating RD, voiding, ambulating.    Physical Exam  General: NAD, resting comfortably in bed  Pulmonary: Nonlabored breathing, no respiratory distress  Cardiovascular: NSR  Abdominal: soft, NT/ND, incision with dressing c/d/i  Extremities: WWP    Vital Signs Last 24 Hrs  T(C): 36.3 (30 Jan 2021 05:17), Max: 36.9 (29 Jan 2021 09:18)  T(F): 97.4 (30 Jan 2021 05:17), Max: 98.4 (29 Jan 2021 09:18)  HR: 70 (30 Jan 2021 05:17) (64 - 70)  BP: 125/65 (30 Jan 2021 05:17) (103/62 - 136/69)  BP(mean): --  RR: 18 (30 Jan 2021 05:17) (18 - 18)  SpO2: 95% (30 Jan 2021 05:17) (95% - 98%)    I&O's Detail    28 Jan 2021 07:01  -  29 Jan 2021 07:00  --------------------------------------------------------  IN:    Lactated Ringers: 720 mL  Total IN: 720 mL    OUT:    Indwelling Catheter - Urethral (mL): 1400 mL  Total OUT: 1400 mL    Total NET: -680 mL      29 Jan 2021 07:01  -  30 Jan 2021 06:31  --------------------------------------------------------  IN:    Oral Fluid: 280 mL  Total IN: 280 mL    OUT:    Indwelling Catheter - Urethral (mL): 100 mL    Voided (mL): 2500 mL  Total OUT: 2600 mL    Total NET: -2320 mL      MEDICATIONS  (STANDING):  acetaminophen   Tablet .. 1000 milliGRAM(s) Oral every 6 hours  cefoTEtan  IVPB 2 Gram(s) IV Intermittent once  enoxaparin Injectable 40 milliGRAM(s) SubCutaneous every 24 hours  ibuprofen  Tablet. 400 milliGRAM(s) Oral every 4 hours  magnesium oxide 1000 milliGRAM(s) Oral two times a day with meals    MEDICATIONS  (PRN):  naloxone Injectable 0.1 milliGRAM(s) IV Push every 3 minutes PRN For ANY of the following changes in patient status:  A. RR LESS THAN 10 breaths per minute, B. Oxygen saturation LESS THAN 90%, C. Sedation score of 6  oxyCODONE    IR 5 milliGRAM(s) Oral every 4 hours PRN Moderate Pain (4 - 6)  oxyCODONE    IR 10 milliGRAM(s) Oral every 4 hours PRN Severe Pain (7 - 10)      LABS:                        8.8    9.90  )-----------( 347      ( 29 Jan 2021 08:31 )             28.2     01-29    130<L>  |  94<L>  |  11  ----------------------------<  84  4.2   |  26  |  0.59    Ca    9.1      29 Jan 2021 08:31  Phos  3.6     01-29  Mg     2.0     01-29                
Day 1 of Anesthesia Pain Management Service    SUBJECTIVE:  Pain Scale Score:          [X] Refer to charted pain scores    THERAPY: Received PF spinal morphine as above    OBJECTIVE:    Sedation:        	[X] Alert	[ ] Drowsy	[ ] Arousable      [ ] Asleep       [ ] Unresponsive    Side Effects:	[X] None	[ ] Nausea	[ ] Vomiting         [ ] Pruritus  		[ ] Weakness            [ ] Numbness	          [ ] Other:    ASSESSMENT/ PLAN  [X] Patient transitioned to prn analgesics  [X] Pain management per primary service, pain service to sign off   [X]Documentation and Verification of current medications
SURGERY DAILY PROGRESS NOTE:     SUBJECTIVE/ROS: Patient seen and examined. She denies abdominal pain, nausea, vomiting, chest pain, SOB. She has not passed flatus yet or had a bowel movement. Drinking small amounts of CLD and tolerating well.     MEDICATIONS  (STANDING):  acetaminophen   Tablet .. 1000 milliGRAM(s) Oral every 6 hours  acetaminophen  IVPB .. 1000 milliGRAM(s) IV Intermittent every 6 hours  cefoTEtan  IVPB 2 Gram(s) IV Intermittent once  enoxaparin Injectable 40 milliGRAM(s) SubCutaneous every 24 hours  ibuprofen  Tablet. 400 milliGRAM(s) Oral every 4 hours  lactated ringers. 1000 milliLiter(s) (40 mL/Hr) IV Continuous <Continuous>  magnesium oxide 1000 milliGRAM(s) Oral two times a day with meals    MEDICATIONS  (PRN):  naloxone Injectable 0.1 milliGRAM(s) IV Push every 3 minutes PRN For ANY of the following changes in patient status:  A. RR LESS THAN 10 breaths per minute, B. Oxygen saturation LESS THAN 90%, C. Sedation score of 6  oxyCODONE    IR 5 milliGRAM(s) Oral every 4 hours PRN Moderate Pain (4 - 6)  oxyCODONE    IR 10 milliGRAM(s) Oral every 4 hours PRN Severe Pain (7 - 10)      OBJECTIVE:    Vital Signs Last 24 Hrs  T(C): 36.6 (29 Jan 2021 06:19), Max: 36.6 (28 Jan 2021 13:00)  T(F): 97.8 (29 Jan 2021 06:19), Max: 97.9 (28 Jan 2021 13:00)  HR: 61 (29 Jan 2021 06:19) (54 - 72)  BP: 130/65 (29 Jan 2021 06:19) (106/56 - 133/74)  BP(mean): 75 (28 Jan 2021 12:30) (75 - 86)  RR: 18 (29 Jan 2021 06:19) (16 - 18)  SpO2: 100% (29 Jan 2021 06:19) (97% - 100%)        I&O's Detail    28 Jan 2021 07:01  -  29 Jan 2021 07:00  --------------------------------------------------------  IN:    Lactated Ringers: 720 mL  Total IN: 720 mL    OUT:    Indwelling Catheter - Urethral (mL): 1400 mL  Total OUT: 1400 mL    Total NET: -680 mL      29 Jan 2021 07:01  -  29 Jan 2021 10:03  --------------------------------------------------------  IN:  Total IN: 0 mL    OUT:    Indwelling Catheter - Urethral (mL): 100 mL  Total OUT: 100 mL    Total NET: -100 mL          Daily     Daily     LABS:                        8.8    9.90  )-----------( 347      ( 29 Jan 2021 08:31 )             28.2     01-29    130<L>  |  94<L>  |  11  ----------------------------<  84  4.2   |  26  |  0.59    Ca    9.1      29 Jan 2021 08:31  Phos  3.6     01-29  Mg     2.0     01-29                  Physical Exam:  General: NAD, resting comfortably in bed  Pulmonary: Nonlabored breathing, no respiratory distress  Cardiovascular: NSR  Abdominal: soft, NT/ND, incision with dressing c/d/i  Extremities: WWP          
Subjective: Patient reports feeling well. +/+. Clotted blood in BMs continuing to resolve. H&H stable yesterday PM. Voiding. Ambulating. Pain well controlled    Objective:  Physical Exam  General: NAD, resting comfortably in bed  Pulmonary: Nonlabored breathing, no respiratory distress  Cardiovascular: NSR  Abdominal: soft, NT/ND, incision with dressing with minimal s/s strikethrough, changed at bedside  Extremities: WWP    Vital Signs Last 24 Hrs  T(C): 36.9 (01 Feb 2021 02:00), Max: 36.9 (31 Jan 2021 21:47)  T(F): 98.5 (01 Feb 2021 02:00), Max: 98.5 (31 Jan 2021 21:47)  HR: 64 (01 Feb 2021 02:00) (64 - 88)  BP: 146/76 (01 Feb 2021 02:00) (123/68 - 165/72)  BP(mean): --  RR: 18 (01 Feb 2021 02:00) (18 - 18)  SpO2: 98% (01 Feb 2021 02:00) (96% - 99%)    I&O's Detail    30 Jan 2021 07:01  -  31 Jan 2021 07:00  --------------------------------------------------------  IN:    Oral Fluid: 620 mL    PRBCs (Packed Red Blood Cells): 600 mL  Total IN: 1220 mL    OUT:    Voided (mL): 1900 mL  Total OUT: 1900 mL    Total NET: -680 mL      31 Jan 2021 07:01  -  01 Feb 2021 03:51  --------------------------------------------------------  IN:    IV PiggyBack: 50 mL    Oral Fluid: 580 mL  Total IN: 630 mL    OUT:    Voided (mL): 2350 mL  Total OUT: 2350 mL    Total NET: -1720 mL      MEDICATIONS  (STANDING):  acetaminophen   Tablet .. 1000 milliGRAM(s) Oral every 6 hours  ibuprofen  Tablet. 400 milliGRAM(s) Oral every 4 hours    MEDICATIONS  (PRN):  oxyCODONE    IR 5 milliGRAM(s) Oral every 4 hours PRN Moderate Pain (4 - 6)  oxyCODONE    IR 10 milliGRAM(s) Oral every 4 hours PRN Severe Pain (7 - 10)      LABS:                        8.6    8.64  )-----------( 278      ( 31 Jan 2021 18:34 )             26.4     01-31    137  |  103  |  7   ----------------------------<  85  3.7   |  26  |  0.55    Ca    8.2<L>      31 Jan 2021 07:10  Phos  3.3     01-31  Mg     1.7     01-31                
Subjective: Patient transfused 2u PRBC overnight for Hgb <7, with appropriate response. Patient is tolerating LRD. No pain. Ambulating. Voiding. Had single episode of BBM this AM, smaller volume than yesterday. States she feels well.     Objective:  Physical Exam  General: NAD, resting comfortably in bed  Pulmonary: Nonlabored breathing, no respiratory distress  Cardiovascular: NSR  Abdominal: soft, NT/ND, incision with dressing with minimal s/s strikethrough, changed at bedside  Extremities: WWP    Vital Signs Last 24 Hrs  T(C): 36.7 (31 Jan 2021 05:27), Max: 36.9 (30 Jan 2021 13:34)  T(F): 98.1 (31 Jan 2021 05:27), Max: 98.4 (30 Jan 2021 13:34)  HR: 69 (31 Jan 2021 05:27) (69 - 88)  BP: 130/68 (31 Jan 2021 05:27) (121/75 - 159/70)  BP(mean): --  RR: 18 (31 Jan 2021 05:27) (15 - 18)  SpO2: 97% (31 Jan 2021 05:27) (97% - 99%)    I&O's Detail    30 Jan 2021 07:01  -  31 Jan 2021 07:00  --------------------------------------------------------  IN:    Oral Fluid: 620 mL    PRBCs (Packed Red Blood Cells): 600 mL  Total IN: 1220 mL    OUT:    Voided (mL): 1900 mL  Total OUT: 1900 mL    Total NET: -680 mL      MEDICATIONS  (STANDING):  acetaminophen   Tablet .. 1000 milliGRAM(s) Oral every 6 hours  ibuprofen  Tablet. 400 milliGRAM(s) Oral every 4 hours  magnesium sulfate  IVPB 2 Gram(s) IV Intermittent once  potassium chloride    Tablet ER 20 milliEquivalent(s) Oral every 2 hours    MEDICATIONS  (PRN):  oxyCODONE    IR 5 milliGRAM(s) Oral every 4 hours PRN Moderate Pain (4 - 6)  oxyCODONE    IR 10 milliGRAM(s) Oral every 4 hours PRN Severe Pain (7 - 10)      LABS:                        8.7    6.29  )-----------( 260      ( 31 Jan 2021 07:22 )             26.7     01-31    137  |  103  |  7   ----------------------------<  85  3.7   |  26  |  0.55    Ca    8.2<L>      31 Jan 2021 07:10  Phos  3.3     01-31  Mg     1.7     01-31            ABO Interpretation: O (01-30-21 @ 09:15)

## 2021-02-02 NOTE — PROGRESS NOTE ADULT - ASSESSMENT
A/P: 70 yr old female with PMH HTN, Breast Ca (with radiation), Thalassemia minor, s/p right breast lumpectomy, diverticulitis. Due to complicated diverticulitis, Pt had open low anterior resection, right salpingectomy oophorectomy & D&C on 11/10/20. Pt is now POD 5 s/p closure of loop ileostomy, recovering appropriately.     - ERP protocol  - LRD  - Pain control with PO medications  - Monitor BMs for blood   - DVT ppx  - PT evaluation (home with no needs), continue with OOB  - Caprini 6, no need for Outpatient DVT ppx  - home today, restart home meds    Red surgery   [id number]

## 2021-02-03 ENCOUNTER — NON-APPOINTMENT (OUTPATIENT)
Age: 71
End: 2021-02-03

## 2021-02-04 ENCOUNTER — NON-APPOINTMENT (OUTPATIENT)
Age: 71
End: 2021-02-04

## 2021-02-04 LAB — SURGICAL PATHOLOGY STUDY: SIGNIFICANT CHANGE UP

## 2021-02-05 ENCOUNTER — NON-APPOINTMENT (OUTPATIENT)
Age: 71
End: 2021-02-05

## 2021-02-10 ENCOUNTER — APPOINTMENT (OUTPATIENT)
Dept: COLORECTAL SURGERY | Facility: CLINIC | Age: 71
End: 2021-02-10
Payer: MEDICARE

## 2021-02-10 PROCEDURE — 99024 POSTOP FOLLOW-UP VISIT: CPT

## 2021-02-11 NOTE — HISTORY OF PRESENT ILLNESS
[FreeTextEntry1] : 70-year-old female who presents for her first postoperative visit.\par \par The patient is approximately 2 weeks status post closure of diverting loop ileostomy. The ostomy was constructed to protect an at risk colorectal anastomosis done after resection of complicated diverticulitis. Patient's postoperative course was complicated by suture line bleeding which required transfusion but resolved with conservative therapy. Subsequent to discharge she has had no further bleeding.\par \par The patient looks and feels well. She is still having some incisional pain but this is improving. The wound is significantly smaller than it was and drainage is diminishing. She denies any ongoing rectal bleeding or  temperature elevation. She is having multiple small formed bowel movements without difficulty.\par \par Physical examination reveals a soft, nontender, and nondistended abdomen. Her right lower quadrant loop ileostomy closure site is pink, granulating and rocael nicely. There is no evidence of wound infection.\par \par The patient was encouraged that she is making an excellent recuperation. I want her to remain on a low-residue diet for an additional month. She may take a daily dose of a bulk forming laxative. I will see her in one month.

## 2021-03-04 NOTE — H&P PST ADULT - FALL HARM RISK TYPE OF ASSESSMENT
Preventive Health Recommendations  Male Ages 26 - 39    Yearly exam:             See your health care provider every year in order to  o   Review health changes.   o   Discuss preventive care.    o   Review your medicines if your doctor has prescribed any.    You should be tested each year for STDs (sexually transmitted diseases), if you re at risk.     After age 35, talk to your provider about cholesterol testing. If you are at risk for heart disease, have your cholesterol tested at least every 5 years.     If you are at risk for diabetes, you should have a diabetes test (fasting glucose).  Shots: Get a flu shot each year. Get a tetanus shot every 10 years.     Nutrition:    Eat at least 5 servings of fruits and vegetables daily.     Eat whole-grain bread, whole-wheat pasta and brown rice instead of white grains and rice.     Get adequate Calcium and Vitamin D.     Lifestyle    Exercise for at least 150 minutes a week (30 minutes a day, 5 days a week). This will help you control your weight and prevent disease.     Limit alcohol to one drink per day.     No smoking.     Wear sunscreen to prevent skin cancer.     See your dentist every six months for an exam and cleaning.     
Admission

## 2021-03-10 ENCOUNTER — APPOINTMENT (OUTPATIENT)
Dept: COLORECTAL SURGERY | Facility: CLINIC | Age: 71
End: 2021-03-10
Payer: MEDICARE

## 2021-03-10 PROCEDURE — 99024 POSTOP FOLLOW-UP VISIT: CPT

## 2021-03-10 NOTE — HISTORY OF PRESENT ILLNESS
[FreeTextEntry1] : Very pleasant 71-year-old female who presents for her third postoperative visit.\par \par The patient is approximately 6 weeks status post closure of  a diverting loop ileostomy. The ileostomy was constructed to protect an at risk colorectal anastomosis done after resection of complicated diverticulitis. Patient looks and feels quite well. She is having minimal incisional pain and this is markedly improved. She is tolerating diet and having formed bowel movements. She denies rectal bleeding  or  temperature elevation.\par \par Physical examination reveals a soft, nontender, nondistended abdomen. Her ileostomy closure site is fully healed.\par \par She  has no dietary or physical activity restrictions.\par \par I will see her as needed. I reminded her to have a full colonoscopy in approximately 5 years as she had one just prior to her resection.

## 2021-05-12 ENCOUNTER — RX RENEWAL (OUTPATIENT)
Age: 71
End: 2021-05-12

## 2021-05-17 ENCOUNTER — APPOINTMENT (OUTPATIENT)
Dept: INTERNAL MEDICINE | Facility: CLINIC | Age: 71
End: 2021-05-17
Payer: MEDICARE

## 2021-05-17 VITALS — DIASTOLIC BLOOD PRESSURE: 70 MMHG | SYSTOLIC BLOOD PRESSURE: 145 MMHG

## 2021-05-17 VITALS
BODY MASS INDEX: 23.79 KG/M2 | TEMPERATURE: 98 F | WEIGHT: 157 LBS | OXYGEN SATURATION: 98 % | HEART RATE: 84 BPM | RESPIRATION RATE: 16 BRPM | HEIGHT: 68 IN

## 2021-05-17 DIAGNOSIS — D62 ACUTE POSTHEMORRHAGIC ANEMIA: ICD-10-CM

## 2021-05-17 DIAGNOSIS — E87.1 HYPO-OSMOLALITY AND HYPONATREMIA: ICD-10-CM

## 2021-05-17 DIAGNOSIS — K57.32 DIVERTICULITIS OF LARGE INTESTINE W/OUT PERFORATION OR ABSCESS W/OUT BLEEDING: ICD-10-CM

## 2021-05-17 PROCEDURE — 99214 OFFICE O/P EST MOD 30 MIN: CPT

## 2021-05-17 RX ORDER — CAMPHOR 0.45 %
25 GEL (GRAM) TOPICAL
Qty: 2 | Refills: 0 | Status: DISCONTINUED | COMMUNITY
Start: 2021-01-14 | End: 2021-05-17

## 2021-05-17 RX ORDER — ALBUTEROL SULFATE 90 UG/1
108 (90 BASE) INHALANT RESPIRATORY (INHALATION) EVERY 4 HOURS
Qty: 8.5 | Refills: 0 | Status: DISCONTINUED | COMMUNITY
Start: 2019-12-03 | End: 2021-05-17

## 2021-05-17 RX ORDER — NYSTATIN 100000 [USP'U]/ML
100000 SUSPENSION ORAL 3 TIMES DAILY
Qty: 150 | Refills: 0 | Status: DISCONTINUED | COMMUNITY
Start: 2020-10-05 | End: 2021-05-17

## 2021-05-17 RX ORDER — PREDNISONE 50 MG/1
50 TABLET ORAL
Qty: 3 | Refills: 0 | Status: DISCONTINUED | COMMUNITY
Start: 2021-01-14 | End: 2021-05-17

## 2021-05-17 NOTE — HISTORY OF PRESENT ILLNESS
[FreeTextEntry1] : anemia, colostomy reversal [de-identified] : Pt was in hospital for colostomy reversal and was also anemic Hgb < 7 \par -transfused 2 units PRBC\par \par Lost 27 lbs but gained back 7 lbs

## 2021-06-03 ENCOUNTER — LABORATORY RESULT (OUTPATIENT)
Age: 71
End: 2021-06-03

## 2021-06-07 ENCOUNTER — NON-APPOINTMENT (OUTPATIENT)
Age: 71
End: 2021-06-07

## 2021-06-07 ENCOUNTER — APPOINTMENT (OUTPATIENT)
Dept: CARDIOLOGY | Facility: CLINIC | Age: 71
End: 2021-06-07
Payer: MEDICARE

## 2021-06-07 VITALS
HEART RATE: 90 BPM | RESPIRATION RATE: 20 BRPM | WEIGHT: 157 LBS | HEIGHT: 68 IN | SYSTOLIC BLOOD PRESSURE: 194 MMHG | DIASTOLIC BLOOD PRESSURE: 85 MMHG | OXYGEN SATURATION: 100 % | BODY MASS INDEX: 23.79 KG/M2 | TEMPERATURE: 98.1 F

## 2021-06-07 DIAGNOSIS — Z82.49 FAMILY HISTORY OF ISCHEMIC HEART DISEASE AND OTHER DISEASES OF THE CIRCULATORY SYSTEM: ICD-10-CM

## 2021-06-07 PROCEDURE — 99203 OFFICE O/P NEW LOW 30 MIN: CPT

## 2021-06-07 PROCEDURE — 93000 ELECTROCARDIOGRAM COMPLETE: CPT

## 2021-06-08 ENCOUNTER — APPOINTMENT (OUTPATIENT)
Dept: CARDIOLOGY | Facility: CLINIC | Age: 71
End: 2021-06-08
Payer: MEDICARE

## 2021-06-08 ENCOUNTER — TRANSCRIPTION ENCOUNTER (OUTPATIENT)
Age: 71
End: 2021-06-08

## 2021-06-08 LAB
ALBUMIN SERPL ELPH-MCNC: 4.1 G/DL
ALP BLD-CCNC: 68 U/L
ALT SERPL-CCNC: 11 U/L
ANION GAP SERPL CALC-SCNC: 8 MMOL/L
AST SERPL-CCNC: 16 U/L
BASOPHILS # BLD AUTO: 0.08 K/UL
BASOPHILS NFR BLD AUTO: 1.2 %
BILIRUB SERPL-MCNC: 0.3 MG/DL
BUN SERPL-MCNC: 13 MG/DL
CALCIUM SERPL-MCNC: 9 MG/DL
CHLORIDE SERPL-SCNC: 98 MMOL/L
CHOLEST SERPL-MCNC: 205 MG/DL
CO2 SERPL-SCNC: 28 MMOL/L
CREAT SERPL-MCNC: 0.62 MG/DL
EOSINOPHIL # BLD AUTO: 0.25 K/UL
EOSINOPHIL NFR BLD AUTO: 3.8 %
ESTIMATED AVERAGE GLUCOSE: 105 MG/DL
FERRITIN SERPL-MCNC: 7 NG/ML
GLUCOSE SERPL-MCNC: 91 MG/DL
HBA1C MFR BLD HPLC: 5.3 %
HCT VFR BLD CALC: 24.3 %
HDLC SERPL-MCNC: 74 MG/DL
HGB BLD-MCNC: 7.4 G/DL
IMM GRANULOCYTES NFR BLD AUTO: 0.3 %
IRON SATN MFR SERPL: 3 %
IRON SERPL-MCNC: 15 UG/DL
LDLC SERPL CALC-MCNC: 116 MG/DL
LYMPHOCYTES # BLD AUTO: 1.41 K/UL
LYMPHOCYTES NFR BLD AUTO: 21.5 %
MAN DIFF?: NORMAL
MCHC RBC-ENTMCNC: 17.9 PG
MCHC RBC-ENTMCNC: 30.5 GM/DL
MCV RBC AUTO: 58.8 FL
MONOCYTES # BLD AUTO: 0.53 K/UL
MONOCYTES NFR BLD AUTO: 8.1 %
NEUTROPHILS # BLD AUTO: 4.26 K/UL
NEUTROPHILS NFR BLD AUTO: 65.1 %
NONHDLC SERPL-MCNC: 131 MG/DL
PLATELET # BLD AUTO: 489 K/UL
POTASSIUM SERPL-SCNC: 4.2 MMOL/L
PROT SERPL-MCNC: 6.3 G/DL
RBC # BLD: 4.13 M/UL
RBC # BLD: 4.13 M/UL
RBC # FLD: 15 %
RETICS # AUTO: 1.2 %
RETICS AGGREG/RBC NFR: 49.5 K/UL
SODIUM SERPL-SCNC: 134 MMOL/L
TIBC SERPL-MCNC: 441 UG/DL
TRIGL SERPL-MCNC: 76 MG/DL
TSH SERPL-ACNC: 2.06 UIU/ML
UIBC SERPL-MCNC: 426 UG/DL
WBC # FLD AUTO: 6.55 K/UL

## 2021-06-08 PROCEDURE — 93880 EXTRACRANIAL BILAT STUDY: CPT

## 2021-06-09 ENCOUNTER — LABORATORY RESULT (OUTPATIENT)
Age: 71
End: 2021-06-09

## 2021-06-10 ENCOUNTER — TRANSCRIPTION ENCOUNTER (OUTPATIENT)
Age: 71
End: 2021-06-10

## 2021-06-10 LAB
ABO + RH PNL BLD: NORMAL
BASOPHILS # BLD AUTO: 0.09 K/UL
BASOPHILS NFR BLD AUTO: 1.4 %
EOSINOPHIL # BLD AUTO: 0.21 K/UL
EOSINOPHIL NFR BLD AUTO: 3.2 %
HCT VFR BLD CALC: 25.6 %
HGB BLD-MCNC: 7.6 G/DL
IMM GRANULOCYTES NFR BLD AUTO: 0.5 %
LYMPHOCYTES # BLD AUTO: 1.44 K/UL
LYMPHOCYTES NFR BLD AUTO: 21.8 %
MAN DIFF?: NORMAL
MCHC RBC-ENTMCNC: 17.6 PG
MCHC RBC-ENTMCNC: 29.7 GM/DL
MCV RBC AUTO: 59.3 FL
MONOCYTES # BLD AUTO: 0.52 K/UL
MONOCYTES NFR BLD AUTO: 7.9 %
NEUTROPHILS # BLD AUTO: 4.31 K/UL
NEUTROPHILS NFR BLD AUTO: 65.2 %
PLATELET # BLD AUTO: 478 K/UL
RBC # BLD: 4.32 M/UL
RBC # BLD: 4.32 M/UL
RBC # FLD: 15.4 %
RETICS # AUTO: 1.3 %
RETICS AGGREG/RBC NFR: 55.6 K/UL
WBC # FLD AUTO: 6.6 K/UL

## 2021-06-11 ENCOUNTER — APPOINTMENT (OUTPATIENT)
Dept: CARDIOLOGY | Facility: CLINIC | Age: 71
End: 2021-06-11
Payer: MEDICARE

## 2021-06-11 PROCEDURE — 93306 TTE W/DOPPLER COMPLETE: CPT

## 2021-06-17 ENCOUNTER — OUTPATIENT (OUTPATIENT)
Dept: OUTPATIENT SERVICES | Facility: HOSPITAL | Age: 71
LOS: 1 days | Discharge: HOME | End: 2021-06-17

## 2021-06-17 DIAGNOSIS — Z98.891 HISTORY OF UTERINE SCAR FROM PREVIOUS SURGERY: Chronic | ICD-10-CM

## 2021-06-17 DIAGNOSIS — Z90.721 ACQUIRED ABSENCE OF OVARIES, UNILATERAL: Chronic | ICD-10-CM

## 2021-06-17 DIAGNOSIS — Z98.890 OTHER SPECIFIED POSTPROCEDURAL STATES: Chronic | ICD-10-CM

## 2021-06-17 DIAGNOSIS — D50.9 IRON DEFICIENCY ANEMIA, UNSPECIFIED: ICD-10-CM

## 2021-06-17 DIAGNOSIS — Z90.79 ACQUIRED ABSENCE OF OTHER GENITAL ORGAN(S): Chronic | ICD-10-CM

## 2021-06-18 ENCOUNTER — RESULT REVIEW (OUTPATIENT)
Age: 71
End: 2021-06-18

## 2021-06-18 ENCOUNTER — APPOINTMENT (OUTPATIENT)
Dept: HEMATOLOGY ONCOLOGY | Facility: CLINIC | Age: 71
End: 2021-06-18
Payer: MEDICARE

## 2021-06-18 ENCOUNTER — OUTPATIENT (OUTPATIENT)
Dept: OUTPATIENT SERVICES | Facility: HOSPITAL | Age: 71
LOS: 1 days | End: 2021-06-18
Payer: MEDICARE

## 2021-06-18 ENCOUNTER — APPOINTMENT (OUTPATIENT)
Dept: CARDIOLOGY | Facility: CLINIC | Age: 71
End: 2021-06-18

## 2021-06-18 VITALS
BODY MASS INDEX: 24.91 KG/M2 | SYSTOLIC BLOOD PRESSURE: 186 MMHG | HEIGHT: 66.85 IN | WEIGHT: 158.73 LBS | TEMPERATURE: 97.3 F | RESPIRATION RATE: 17 BRPM | OXYGEN SATURATION: 100 % | DIASTOLIC BLOOD PRESSURE: 83 MMHG | HEART RATE: 78 BPM

## 2021-06-18 DIAGNOSIS — Z98.890 OTHER SPECIFIED POSTPROCEDURAL STATES: Chronic | ICD-10-CM

## 2021-06-18 DIAGNOSIS — E61.1 IRON DEFICIENCY: ICD-10-CM

## 2021-06-18 DIAGNOSIS — Z90.721 ACQUIRED ABSENCE OF OVARIES, UNILATERAL: Chronic | ICD-10-CM

## 2021-06-18 DIAGNOSIS — Z90.79 ACQUIRED ABSENCE OF OTHER GENITAL ORGAN(S): Chronic | ICD-10-CM

## 2021-06-18 DIAGNOSIS — Z80.0 FAMILY HISTORY OF MALIGNANT NEOPLASM OF DIGESTIVE ORGANS: ICD-10-CM

## 2021-06-18 DIAGNOSIS — Z98.891 HISTORY OF UTERINE SCAR FROM PREVIOUS SURGERY: Chronic | ICD-10-CM

## 2021-06-18 DIAGNOSIS — Z87.891 PERSONAL HISTORY OF NICOTINE DEPENDENCE: ICD-10-CM

## 2021-06-18 DIAGNOSIS — D56.3 THALASSEMIA MINOR: ICD-10-CM

## 2021-06-18 DIAGNOSIS — Z98.890 OTHER SPECIFIED POSTPROCEDURAL STATES: ICD-10-CM

## 2021-06-18 LAB
ANISOCYTOSIS BLD QL: SLIGHT — SIGNIFICANT CHANGE UP
BASOPHILS # BLD AUTO: 0.05 K/UL — SIGNIFICANT CHANGE UP (ref 0–0.2)
BASOPHILS NFR BLD AUTO: 0.7 % — SIGNIFICANT CHANGE UP (ref 0–2)
ELLIPTOCYTES BLD QL SMEAR: SLIGHT — SIGNIFICANT CHANGE UP
EOSINOPHIL # BLD AUTO: 0.22 K/UL — SIGNIFICANT CHANGE UP (ref 0–0.5)
EOSINOPHIL NFR BLD AUTO: 2.9 % — SIGNIFICANT CHANGE UP (ref 0–6)
HAPTOGLOB SERPL-MCNC: 127 MG/DL
HCT VFR BLD CALC: 23.4 % — LOW (ref 34.5–45)
HGB BLD-MCNC: 7 G/DL — CRITICAL LOW (ref 11.5–15.5)
HYPOCHROMIA BLD QL: SLIGHT — SIGNIFICANT CHANGE UP
IMM GRANULOCYTES NFR BLD AUTO: 0.5 % — SIGNIFICANT CHANGE UP (ref 0–1.5)
LYMPHOCYTES # BLD AUTO: 1.35 K/UL — SIGNIFICANT CHANGE UP (ref 1–3.3)
LYMPHOCYTES # BLD AUTO: 18 % — SIGNIFICANT CHANGE UP (ref 13–44)
MCHC RBC-ENTMCNC: 17.7 PG — LOW (ref 27–34)
MCHC RBC-ENTMCNC: 29.9 G/DL — LOW (ref 32–36)
MCV RBC AUTO: 59.2 FL — LOW (ref 80–100)
MONOCYTES # BLD AUTO: 0.44 K/UL — SIGNIFICANT CHANGE UP (ref 0–0.9)
MONOCYTES NFR BLD AUTO: 5.9 % — SIGNIFICANT CHANGE UP (ref 2–14)
NEUTROPHILS # BLD AUTO: 5.42 K/UL — SIGNIFICANT CHANGE UP (ref 1.8–7.4)
NEUTROPHILS NFR BLD AUTO: 72 % — SIGNIFICANT CHANGE UP (ref 43–77)
NRBC # BLD: 0 /100 WBCS — SIGNIFICANT CHANGE UP (ref 0–0)
PLAT MORPH BLD: NORMAL — SIGNIFICANT CHANGE UP
PLATELET # BLD AUTO: 501 K/UL — HIGH (ref 150–400)
POIKILOCYTOSIS BLD QL AUTO: SLIGHT — SIGNIFICANT CHANGE UP
RBC # BLD: 3.95 M/UL — SIGNIFICANT CHANGE UP (ref 3.8–5.2)
RBC # FLD: 17.2 % — HIGH (ref 10.3–14.5)
RBC BLD AUTO: ABNORMAL
RETICS #: 126.5 K/UL — HIGH (ref 25–125)
RETICS/RBC NFR: 3.2 % — HIGH (ref 0.5–2.5)
SCHISTOCYTES BLD QL AUTO: SLIGHT — SIGNIFICANT CHANGE UP
WBC # BLD: 7.52 K/UL — SIGNIFICANT CHANGE UP (ref 3.8–10.5)
WBC # FLD AUTO: 7.52 K/UL — SIGNIFICANT CHANGE UP (ref 3.8–10.5)

## 2021-06-18 PROCEDURE — 86900 BLOOD TYPING SEROLOGIC ABO: CPT

## 2021-06-18 PROCEDURE — 86850 RBC ANTIBODY SCREEN: CPT

## 2021-06-18 PROCEDURE — 86923 COMPATIBILITY TEST ELECTRIC: CPT

## 2021-06-18 PROCEDURE — 86901 BLOOD TYPING SEROLOGIC RH(D): CPT

## 2021-06-18 PROCEDURE — 99205 OFFICE O/P NEW HI 60 MIN: CPT

## 2021-06-18 NOTE — CONSULT LETTER
[Dear  ___] : Dear  [unfilled], [Please see my note below.] : Please see my note below. [Consult Letter:] : I had the pleasure of evaluating your patient, [unfilled]. [Consult Closing:] : Thank you very much for allowing me to participate in the care of this patient.  If you have any questions, please do not hesitate to contact me. [Sincerely,] : Sincerely, [FreeTextEntry3] : Court Hunt MD

## 2021-06-18 NOTE — ADDENDUM
[FreeTextEntry1] : After visit today, informed that patient's hemoglobin today is 7.0.\par Telephone call to patient and discussed with her–with decrease in hemoglobin, to consider packed red blood cell transfusion.  Patient stated she does feel weak and does wish to consider outpatient transfusion if it can be arranged.  In the interim, advised patient she should go directly to the emergency department if any increased symptoms/concerns.

## 2021-06-18 NOTE — HISTORY OF PRESENT ILLNESS
[de-identified] : Had 1 year of "digestive issues." 9/2020–Colonoscopy revealed a stricture of benign intrinsic appearance in the sigmoid colon.  Diverticular disease was severe.  Diverticular associated colitis noted.  \par 10/2020–CT scan abdomen/pelvis compared to 9/2020 showed persistent marked inflammation and wall thickening of the sigmoid colon in association with innumerable diverticuli consistent with diverticulitis.  The inflammation extended to the uterus and involved adjacent bowel loops.  \par 11/2020–Patient underwent left colon and rectosigmoid anterior resection with pathology revealing diverticulosis with diverticulitis. Had pelvic abscess. Right adnexa, salpingo-oophorectomy performed with pathology revealing serositis.  Endometrial curettings negative for malignancy.\par 1/2021–Patient underwent closure of a diverting loop ileostomy which had been constructed to protect an at risk colorectal anastomosis done after resection of complicated diverticulitis. 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 \par However, noted an acute drop 6/3/2021 to a hemoglobin of 7.4.  Iron deficiency noted with a ferritin of 7. \par Patient has no current complaints of chest pain, shortness of breath though does feel weak.  No blood per rectum or melena reported, or vaginal bleeding.  No nausea/vomiting, abdominal or pelvic pain. No fevers.  No lymph node complaints.\par Began ferrous gluconate 324 mg daily 1 week ago. Started taking Cipro for UTI symptoms/episode of hematuria this week- symptoms have completely resolved with no further gross bleeding noted.\par Got Covid vaccines ([employer])-first dose 3/2/21/second dose 3/23/21.

## 2021-06-18 NOTE — ASSESSMENT
[FreeTextEntry1] : Lab work, CT A/P 10/2020, pathology results, Dr. Marx's 3/10/21 note reviewed.\par #1) Anemia/iron deficiency-discussed potential etiologies with patient.  Diverticulosis/diverticulitis with GI bleeding followed by surgery contributing.  In addition, recent episode of hematuria may exacerbate.\par \par Management options discussed.  Discussed potential benefits/side effects of p.o. versus parenteral iron preparations.  Would consider IV iron in light of severity of anemia (patient also wishes to pursue this)–potential side effects explained including but not limited to hypersensitivity/allergic reaction, skin hyperpigmentation.  Patient gave verbal and written consent for IV Feraheme.  Interval follow-up lab work to be done.\par \par Discussed differential diagnosis of anemia with other potential etiologies as well–will check haptoglobin, SPEP, vitamin B12/folate.  Pending course, and should hematologic scenario change/worsen, can decide if prudent to pursue further evaluation to rule out underlying BM disorder.  In addition, have recommended GI follow-up for further GI evaluation to include possible EGD/small bowel study.\par \par #2)  History of thalassemia–hemoglobin electrophoresis ordered for documentation.\par \par #3) history of thrombocytosis–clinically suspect represents a reactive process currently.  However, will check Lawson 2 mutation with lab work.  Continue to monitor CBC as well.\par \par Patient was given the opportunity to ask questions.  Her questions have been answered to her apparent satisfaction at this time.  She expressed her understanding and willingness to comply with recommended follow-up.

## 2021-06-19 LAB
APTT BLD: 27.8 SEC
CREAT SERPL-MCNC: 0.6 MG/DL
FERRITIN SERPL-MCNC: 14 NG/ML
FOLATE SERPL-MCNC: >20 NG/ML
INR PPP: 0.98 RATIO
IRON SATN MFR SERPL: 4 %
IRON SERPL-MCNC: 15 UG/DL
PT BLD: 11.6 SEC
TIBC SERPL-MCNC: 403 UG/DL
UIBC SERPL-MCNC: 389 UG/DL
VIT B12 SERPL-MCNC: 510 PG/ML

## 2021-06-21 ENCOUNTER — APPOINTMENT (OUTPATIENT)
Dept: INFUSION THERAPY | Facility: HOSPITAL | Age: 71
End: 2021-06-21

## 2021-06-21 ENCOUNTER — TRANSCRIPTION ENCOUNTER (OUTPATIENT)
Age: 71
End: 2021-06-21

## 2021-06-22 ENCOUNTER — NON-APPOINTMENT (OUTPATIENT)
Age: 71
End: 2021-06-22

## 2021-06-22 LAB
HGB A MFR BLD: 94.9 %
HGB A2 MFR BLD: 5.1 %
HGB FRACT BLD-IMP: NORMAL

## 2021-06-23 LAB
ALBUMIN MFR SERPL ELPH: 59.9 %
ALBUMIN SERPL-MCNC: 4 G/DL
ALBUMIN/GLOB SERPL: 1.5 RATIO
ALPHA1 GLOB MFR SERPL ELPH: 5.6 %
ALPHA1 GLOB SERPL ELPH-MCNC: 0.4 G/DL
ALPHA2 GLOB MFR SERPL ELPH: 9.9 %
ALPHA2 GLOB SERPL ELPH-MCNC: 0.7 G/DL
B-GLOBULIN MFR SERPL ELPH: 12.5 %
B-GLOBULIN SERPL ELPH-MCNC: 0.8 G/DL
GAMMA GLOB FLD ELPH-MCNC: 0.8 G/DL
GAMMA GLOB MFR SERPL ELPH: 12.1 %
INTERPRETATION SERPL IEP-IMP: NORMAL
PROT SERPL-MCNC: 6.6 G/DL
PROT SERPL-MCNC: 6.6 G/DL

## 2021-06-25 ENCOUNTER — TRANSCRIPTION ENCOUNTER (OUTPATIENT)
Age: 71
End: 2021-06-25

## 2021-06-25 ENCOUNTER — APPOINTMENT (OUTPATIENT)
Dept: INFUSION THERAPY | Facility: HOSPITAL | Age: 71
End: 2021-06-25

## 2021-06-25 DIAGNOSIS — N39.0 URINARY TRACT INFECTION, SITE NOT SPECIFIED: ICD-10-CM

## 2021-06-28 LAB — JAK2RLX: NORMAL

## 2021-06-29 ENCOUNTER — TRANSCRIPTION ENCOUNTER (OUTPATIENT)
Age: 71
End: 2021-06-29

## 2021-07-02 ENCOUNTER — APPOINTMENT (OUTPATIENT)
Dept: INFUSION THERAPY | Facility: HOSPITAL | Age: 71
End: 2021-07-02

## 2021-07-02 RX ORDER — CARVEDILOL PHOSPHATE 80 MG/1
1 CAPSULE, EXTENDED RELEASE ORAL
Qty: 0 | Refills: 0 | DISCHARGE

## 2021-07-02 RX ORDER — ASPIRIN/CALCIUM CARB/MAGNESIUM 324 MG
1 TABLET ORAL
Qty: 0 | Refills: 0 | DISCHARGE

## 2021-07-02 RX ORDER — OLMESARTAN MEDOXOMIL 5 MG/1
1 TABLET, FILM COATED ORAL
Qty: 0 | Refills: 0 | DISCHARGE

## 2021-07-12 NOTE — PATIENT PROFILE ADULT - NSASFALLNEEDSASSIST_GEN_A_NUR
Implemented All Fall with Harm Risk Interventions:  [place] to call system. Call bell, personal items and telephone within reach. Instruct patient to call for assistance. Room bathroom lighting operational. Non-slip footwear when patient is off stretcher. Physically safe environment: no spills, clutter or unnecessary equipment. Stretcher in lowest position, wheels locked, appropriate side rails in place. Provide visual cue, wrist band, yellow gown, etc. Monitor gait and stability. Monitor for mental status changes and reorient to person, place, and time. Review medications for side effects contributing to fall risk. Reinforce activity limits and safety measures with patient and family. Provide visual clues: red socks.
no

## 2021-07-23 ENCOUNTER — LABORATORY RESULT (OUTPATIENT)
Age: 71
End: 2021-07-23

## 2021-07-26 LAB
APPEARANCE: CLEAR
BILIRUBIN URINE: NEGATIVE
BLOOD URINE: NEGATIVE
COLOR: COLORLESS
GLUCOSE QUALITATIVE U: NEGATIVE
KETONES URINE: NEGATIVE
LEUKOCYTE ESTERASE URINE: NEGATIVE
NITRITE URINE: NEGATIVE
PH URINE: 7
PROTEIN URINE: NEGATIVE
SPECIFIC GRAVITY URINE: 1.01
UROBILINOGEN URINE: NORMAL

## 2021-07-27 ENCOUNTER — OUTPATIENT (OUTPATIENT)
Dept: OUTPATIENT SERVICES | Facility: HOSPITAL | Age: 71
LOS: 1 days | Discharge: HOME | End: 2021-07-27

## 2021-07-27 DIAGNOSIS — Z90.79 ACQUIRED ABSENCE OF OTHER GENITAL ORGAN(S): Chronic | ICD-10-CM

## 2021-07-27 DIAGNOSIS — Z98.890 OTHER SPECIFIED POSTPROCEDURAL STATES: Chronic | ICD-10-CM

## 2021-07-27 DIAGNOSIS — Z98.891 HISTORY OF UTERINE SCAR FROM PREVIOUS SURGERY: Chronic | ICD-10-CM

## 2021-07-27 DIAGNOSIS — Z90.721 ACQUIRED ABSENCE OF OVARIES, UNILATERAL: Chronic | ICD-10-CM

## 2021-07-27 DIAGNOSIS — D50.9 IRON DEFICIENCY ANEMIA, UNSPECIFIED: ICD-10-CM

## 2021-07-28 ENCOUNTER — APPOINTMENT (OUTPATIENT)
Dept: HEMATOLOGY ONCOLOGY | Facility: CLINIC | Age: 71
End: 2021-07-28
Payer: MEDICARE

## 2021-07-28 VITALS
TEMPERATURE: 97.8 F | OXYGEN SATURATION: 97 % | HEIGHT: 66.34 IN | RESPIRATION RATE: 16 BRPM | WEIGHT: 161.82 LBS | HEART RATE: 66 BPM | BODY MASS INDEX: 25.7 KG/M2

## 2021-07-28 PROBLEM — D50.9 IRON DEFICIENCY ANEMIA, UNSPECIFIED: Chronic | Status: ACTIVE | Noted: 2021-06-21

## 2021-07-28 PROBLEM — R19.7 DIARRHEA, UNSPECIFIED: Chronic | Status: ACTIVE | Noted: 2021-06-21

## 2021-07-28 PROBLEM — M54.9 DORSALGIA, UNSPECIFIED: Chronic | Status: ACTIVE | Noted: 2021-06-21

## 2021-07-28 PROBLEM — E78.00 PURE HYPERCHOLESTEROLEMIA, UNSPECIFIED: Chronic | Status: ACTIVE | Noted: 2021-06-21

## 2021-07-28 PROBLEM — M17.10 UNILATERAL PRIMARY OSTEOARTHRITIS, UNSPECIFIED KNEE: Chronic | Status: ACTIVE | Noted: 2021-06-21

## 2021-07-28 PROBLEM — Z87.42 PERSONAL HISTORY OF OTHER DISEASES OF THE FEMALE GENITAL TRACT: Chronic | Status: ACTIVE | Noted: 2021-06-21

## 2021-07-28 PROBLEM — K92.1 MELENA: Chronic | Status: ACTIVE | Noted: 2021-06-21

## 2021-07-28 PROBLEM — Z87.19 PERSONAL HISTORY OF OTHER DISEASES OF THE DIGESTIVE SYSTEM: Chronic | Status: ACTIVE | Noted: 2021-06-21

## 2021-07-28 PROBLEM — Z87.09 PERSONAL HISTORY OF OTHER DISEASES OF THE RESPIRATORY SYSTEM: Chronic | Status: ACTIVE | Noted: 2021-06-21

## 2021-07-28 PROBLEM — M19.90 UNSPECIFIED OSTEOARTHRITIS, UNSPECIFIED SITE: Chronic | Status: ACTIVE | Noted: 2021-06-21

## 2021-07-28 PROCEDURE — 99214 OFFICE O/P EST MOD 30 MIN: CPT

## 2021-07-28 NOTE — ASSESSMENT
[FreeTextEntry1] : Lab work reviewed.\par #1) Anemia/iron deficiency-h/o diverticulosis/diverticulitis with GI bleeding followed by surgery contributed.  In addition, prior episode of hematuria.\par Hemoglobin, ferritin increased post IV feraheme.\par \par -T/C GI follow-up for further GI evaluation to include possible EGD/small bowel study if recurrent iron deficiency\par \par #2)  History of thalassemia–beta thal. minor-hemoglobin electrophoresis discussed.\par \par #3) history of thrombocytosis–clinically suspect represents a reactive process currently.  TONO 2 mutation study normal. Continue to monitor CBC as well. F/U lab work ordered. \par \par Should hematologic scenario change/worsen, can decide regarding further evaluation such as BMB.\par \par Patient was given the opportunity to ask questions.  Her questions have been answered to her apparent satisfaction at this time.  She expressed her understanding and willingness to comply with recommended follow-up.

## 2021-07-28 NOTE — HISTORY OF PRESENT ILLNESS
[de-identified] : Had 1 year of "digestive issues." 9/2020–Colonoscopy revealed a stricture of benign intrinsic appearance in the sigmoid colon.  Diverticular disease was severe.  Diverticular associated colitis noted.  \par 10/2020–CT scan abdomen/pelvis compared to 9/2020 showed persistent marked inflammation and wall thickening of the sigmoid colon in association with innumerable diverticuli consistent with diverticulitis.  The inflammation extended to the uterus and involved adjacent bowel loops.  \par 11/2020–Patient underwent left colon and rectosigmoid anterior resection with pathology revealing diverticulosis with diverticulitis. Had pelvic abscess. Right adnexa, salpingo-oophorectomy performed with pathology revealing serositis.  Endometrial curettings negative for malignancy.\par 1/2021–Patient underwent closure of a diverting loop ileostomy which had been constructed to protect an at risk colorectal anastomosis done after resection of complicated diverticulitis. 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 \par However, noted an acute drop 6/3/2021 to a hemoglobin of 7.4.  Iron deficiency noted with a ferritin of 7. S/P IV Feraheme.\par  [de-identified] : Last Feraheme infusion 7/2/21. Feels stamina is better.\par Patient has no current complaints of chest pain, shortness of breath though does feel weak.  No blood per rectum or melena reported, or vaginal bleeding.  No nausea/vomiting, abdominal or pelvic pain. No fevers.  No lymph node complaints.\par Completed antibiotic for UTI-no recurrent symptoms.\par \par Got Covid vaccines (Pfizer)-first dose 3/2/21/second dose 3/23/21.

## 2021-09-02 ENCOUNTER — LABORATORY RESULT (OUTPATIENT)
Age: 71
End: 2021-09-02

## 2021-09-03 LAB
BASOPHILS # BLD AUTO: 0.05 K/UL
BASOPHILS NFR BLD AUTO: 0.7 %
EOSINOPHIL # BLD AUTO: 0.21 K/UL
EOSINOPHIL NFR BLD AUTO: 3.1 %
FERRITIN SERPL-MCNC: 46 NG/ML
HCT VFR BLD CALC: 30 %
HGB BLD-MCNC: 8.9 G/DL
IMM GRANULOCYTES NFR BLD AUTO: 0.3 %
IRON SATN MFR SERPL: 30 %
IRON SERPL-MCNC: 92 UG/DL
LYMPHOCYTES # BLD AUTO: 1.34 K/UL
LYMPHOCYTES NFR BLD AUTO: 19.6 %
MAN DIFF?: NORMAL
MCHC RBC-ENTMCNC: 20.1 PG
MCHC RBC-ENTMCNC: 29.7 GM/DL
MCV RBC AUTO: 67.7 FL
MONOCYTES # BLD AUTO: 0.5 K/UL
MONOCYTES NFR BLD AUTO: 7.3 %
NEUTROPHILS # BLD AUTO: 4.72 K/UL
NEUTROPHILS NFR BLD AUTO: 69 %
PLATELET # BLD AUTO: 467 K/UL
RBC # BLD: 4.43 M/UL
RBC # BLD: 4.43 M/UL
RBC # FLD: 18.3 %
RETICS # AUTO: 2.4 %
RETICS AGGREG/RBC NFR: 107.1 K/UL
TIBC SERPL-MCNC: 304 UG/DL
UIBC SERPL-MCNC: 213 UG/DL
WBC # FLD AUTO: 6.84 K/UL

## 2021-09-07 ENCOUNTER — OUTPATIENT (OUTPATIENT)
Dept: OUTPATIENT SERVICES | Facility: HOSPITAL | Age: 71
LOS: 1 days | Discharge: HOME | End: 2021-09-07

## 2021-09-07 ENCOUNTER — LABORATORY RESULT (OUTPATIENT)
Age: 71
End: 2021-09-07

## 2021-09-07 DIAGNOSIS — D50.9 IRON DEFICIENCY ANEMIA, UNSPECIFIED: ICD-10-CM

## 2021-09-07 DIAGNOSIS — Z90.721 ACQUIRED ABSENCE OF OVARIES, UNILATERAL: Chronic | ICD-10-CM

## 2021-09-07 DIAGNOSIS — Z98.890 OTHER SPECIFIED POSTPROCEDURAL STATES: Chronic | ICD-10-CM

## 2021-09-07 DIAGNOSIS — Z90.79 ACQUIRED ABSENCE OF OTHER GENITAL ORGAN(S): Chronic | ICD-10-CM

## 2021-09-07 DIAGNOSIS — Z98.891 HISTORY OF UTERINE SCAR FROM PREVIOUS SURGERY: Chronic | ICD-10-CM

## 2021-09-08 ENCOUNTER — APPOINTMENT (OUTPATIENT)
Dept: HEMATOLOGY ONCOLOGY | Facility: CLINIC | Age: 71
End: 2021-09-08
Payer: MEDICARE

## 2021-09-08 VITALS
HEIGHT: 67.01 IN | DIASTOLIC BLOOD PRESSURE: 77 MMHG | OXYGEN SATURATION: 99 % | WEIGHT: 160.28 LBS | HEART RATE: 79 BPM | RESPIRATION RATE: 16 BRPM | BODY MASS INDEX: 25.16 KG/M2 | TEMPERATURE: 98.1 F | SYSTOLIC BLOOD PRESSURE: 156 MMHG

## 2021-09-08 PROCEDURE — 99214 OFFICE O/P EST MOD 30 MIN: CPT

## 2021-09-08 NOTE — HISTORY OF PRESENT ILLNESS
[de-identified] : Had 1 year of "digestive issues." 9/2020–Colonoscopy revealed a stricture of benign intrinsic appearance in the sigmoid colon.  Diverticular disease was severe.  Diverticular associated colitis noted.  \par 10/2020–CT scan abdomen/pelvis compared to 9/2020 showed persistent marked inflammation and wall thickening of the sigmoid colon in association with innumerable diverticuli consistent with diverticulitis.  The inflammation extended to the uterus and involved adjacent bowel loops.  \par 11/2020–Patient underwent left colon and rectosigmoid anterior resection with pathology revealing diverticulosis with diverticulitis. Had pelvic abscess. Right adnexa, salpingo-oophorectomy performed with pathology revealing serositis.  Endometrial curettings negative for malignancy.\par 1/2021–Patient underwent closure of a diverting loop ileostomy which had been constructed to protect an at risk colorectal anastomosis done after resection of complicated diverticulitis. Post-op course complicated by blood loss , requiring 2 units PRBC transfusion.\par Patient has a history of chronic anemia secondary to thalassemia minor-Hemoglobin usually is ~9-10. \par However, noted an acute drop 6/3/2021 to a hemoglobin of 7.4.  Iron deficiency noted with a ferritin of 7. S/P IV Feraheme.\par  [de-identified] : Last Feraheme infusion 7/2/21. Feels stamina is still good.\par Patient has no current complaints of chest pain, shortness of breath. No blood per rectum or melena reported, or vaginal bleeding.  No nausea/vomiting, abdominal or pelvic pain. No fevers.  No lymph node complaints.\par \par Got Covid vaccines (Pfizer)-first dose 3/2/21/second dose 3/23/21.

## 2021-09-08 NOTE — ASSESSMENT
[FreeTextEntry1] : Lab work reviewed with patient.\par #1) Anemia/iron deficiency-h/o diverticulosis/diverticulitis with GI bleeding followed by surgery contributed.  In addition, prior episode of hematuria.\par Decreased hemoglobin noted-recommend GI follow-up for further GI evaluation to include possible EGD. Patient declines small bowel study. May need further IV iron pending short interval f/u labs.\par \par #2)  History of thalassemia–beta thal. minor, with baseline hemoglobin ~9-10 per patient.\par \par #3) history of thrombocytosis–clinically suspect represents a reactive process currently.  TONO 2 mutation study normal. CALR, MPL studies pending (I s/w core lab today-tests are being run). Continue to monitor CBC.\par \par Should hematologic scenario change/worsen, can decide regarding further evaluation such as BMB, which was d/w patient today.\par \par Patient was given the opportunity to ask questions.  Her questions have been answered to her apparent satisfaction at this time.  She expressed her understanding and willingness to comply with recommended follow-up.

## 2021-09-10 ENCOUNTER — NON-APPOINTMENT (OUTPATIENT)
Age: 71
End: 2021-09-10

## 2021-10-06 LAB
BASOPHILS # BLD AUTO: 0.08 K/UL
BASOPHILS NFR BLD AUTO: 1.2 %
EOSINOPHIL # BLD AUTO: 0.49 K/UL
EOSINOPHIL NFR BLD AUTO: 7.3 %
FERRITIN SERPL-MCNC: 60 NG/ML
HCT VFR BLD CALC: 31.7 %
HGB BLD-MCNC: 9.7 G/DL
IMM GRANULOCYTES NFR BLD AUTO: 0.3 %
IRON SATN MFR SERPL: 29 %
IRON SERPL-MCNC: 100 UG/DL
LYMPHOCYTES # BLD AUTO: 1.22 K/UL
LYMPHOCYTES NFR BLD AUTO: 18.2 %
MAN DIFF?: NORMAL
MCHC RBC-ENTMCNC: 19.9 PG
MCHC RBC-ENTMCNC: 30.6 GM/DL
MCV RBC AUTO: 65 FL
MONOCYTES # BLD AUTO: 0.49 K/UL
MONOCYTES NFR BLD AUTO: 7.3 %
NEUTROPHILS # BLD AUTO: 4.42 K/UL
NEUTROPHILS NFR BLD AUTO: 65.7 %
PLATELET # BLD AUTO: 407 K/UL
RBC # BLD: 4.88 M/UL
RBC # BLD: 4.88 M/UL
RBC # FLD: 15.1 %
RETICS # AUTO: 1.7 %
RETICS AGGREG/RBC NFR: 82.1 K/UL
TIBC SERPL-MCNC: 342 UG/DL
UIBC SERPL-MCNC: 241 UG/DL
WBC # FLD AUTO: 6.72 K/UL

## 2021-10-11 ENCOUNTER — OUTPATIENT (OUTPATIENT)
Dept: OUTPATIENT SERVICES | Facility: HOSPITAL | Age: 71
LOS: 1 days | Discharge: HOME | End: 2021-10-11

## 2021-10-11 DIAGNOSIS — Z90.721 ACQUIRED ABSENCE OF OVARIES, UNILATERAL: Chronic | ICD-10-CM

## 2021-10-11 DIAGNOSIS — Z98.890 OTHER SPECIFIED POSTPROCEDURAL STATES: Chronic | ICD-10-CM

## 2021-10-11 DIAGNOSIS — D50.9 IRON DEFICIENCY ANEMIA, UNSPECIFIED: ICD-10-CM

## 2021-10-11 DIAGNOSIS — Z98.891 HISTORY OF UTERINE SCAR FROM PREVIOUS SURGERY: Chronic | ICD-10-CM

## 2021-10-11 DIAGNOSIS — Z90.79 ACQUIRED ABSENCE OF OTHER GENITAL ORGAN(S): Chronic | ICD-10-CM

## 2021-10-12 ENCOUNTER — APPOINTMENT (OUTPATIENT)
Dept: HEMATOLOGY ONCOLOGY | Facility: CLINIC | Age: 71
End: 2021-10-12
Payer: MEDICARE

## 2021-10-12 VITALS
BODY MASS INDEX: 25.61 KG/M2 | TEMPERATURE: 97.7 F | DIASTOLIC BLOOD PRESSURE: 81 MMHG | WEIGHT: 163.14 LBS | RESPIRATION RATE: 18 BRPM | SYSTOLIC BLOOD PRESSURE: 153 MMHG | OXYGEN SATURATION: 100 % | HEART RATE: 82 BPM | HEIGHT: 67.01 IN

## 2021-10-12 PROCEDURE — 99213 OFFICE O/P EST LOW 20 MIN: CPT

## 2021-10-12 NOTE — ASSESSMENT
[FreeTextEntry1] : Lab work reviewed with patient.\par #1) Anemia/iron deficiency-h/o diverticulosis/diverticulitis with GI bleeding followed by surgery contributed.  In addition, prior episode of hematuria.\par Hemoglobin improving. GI follow-up for further GI evaluation to include possible EGD if recurrent iron deficiency. Patient has declined small bowel study and wished to defer EGD. She will continue with current PO iron supplement with interval f/u labs.\par \par #2)  History of thalassemia–beta thal. minor, with baseline hemoglobin ~9-11 per patient.\par \par #3) history of thrombocytosis–clinically suspect represents a reactive process currently.  TONO 2 mutation study normal. CALR, MPL studies normal. Continue to monitor CBC.\par \par Should hematologic scenario change/worsen, can decide regarding further evaluation such as BMB.\par \par Patient was given the opportunity to ask questions.  Her questions have been answered to her apparent satisfaction at this time.  She expressed her understanding and willingness to comply with recommended follow-up.

## 2021-10-12 NOTE — HISTORY OF PRESENT ILLNESS
[de-identified] : Had 1 year of "digestive issues." 9/2020–Colonoscopy revealed a stricture of benign intrinsic appearance in the sigmoid colon.  Diverticular disease was severe.  Diverticular associated colitis noted.  \par 10/2020–CT scan abdomen/pelvis compared to 9/2020 showed persistent marked inflammation and wall thickening of the sigmoid colon in association with innumerable diverticuli consistent with diverticulitis.  The inflammation extended to the uterus and involved adjacent bowel loops.  \par 11/2020–Patient underwent left colon and rectosigmoid anterior resection with pathology revealing diverticulosis with diverticulitis. Had pelvic abscess. Right adnexa, salpingo-oophorectomy performed with pathology revealing serositis.  Endometrial curettings negative for malignancy.\par 1/2021–Patient underwent closure of a diverting loop ileostomy which had been constructed to protect an at risk colorectal anastomosis done after resection of complicated diverticulitis. Post-op course complicated by blood loss , requiring 2 units PRBC transfusion.\par Patient has a history of chronic anemia secondary to thalassemia minor-Hemoglobin usually is ~9-10. \par However, noted an acute drop 6/3/2021 to a hemoglobin of 7.4.  Iron deficiency noted with a ferritin of 7. S/P IV Feraheme.\par  [de-identified] : Good energy, and appetite.\par Last Feraheme infusion 7/2/21. Now taking ferrous gluconate-1 tab daily-feels she is tolerating well.\par Patient has no current complaints of chest pain, shortness of breath. No blood per rectum or melena reported, or vaginal bleeding.  No nausea/vomiting, abdominal or pelvic pain. No fevers.  No lymph node complaints.\par Takes Metamucil daily per Dr. 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

## 2021-10-15 NOTE — DISCHARGE NOTE NURSING/CASE MANAGEMENT/SOCIAL WORK - NSSCCONTNUM_GEN_ALL_CORE
[address] [facility] time____10/20/21 0630________        Surgery time____0730________    Take the following medications with a sip of water:    Do not eat or drink anything after 12:00 midnight prior to your surgery. This includes water chewing gum, mints and ice chips. You may brush your teeth and gargle the morning of your surgery, but do not swallow the water     Please see your family doctor/pediatrician for a history and physical and/or concerning medications. Bring any test results/reports from your physicians office. If you are under the care of a heart doctor or specialist doctor, please be aware that you may be asked to them for clearance    You may be asked to stop blood thinners such as Coumadin, Plavix, Fragmin, Lovenox, etc., or any anti-inflammatories such as:  Aspirin, Ibuprofen, Advil, Naproxen prior to your surgery. We also ask that you stop any OTC medications such as fish oil, vitamin E, glucosamine, garlic, Multivitamins, COQ 10, etc.    We ask that you do not smoke 24 hours prior to surgery  We ask that you do not  drink any alcoholic beverages 24 hours prior to surgery     You must make arrangements for a responsible adult to take you home after your surgery. For your safety you will not be allowed to leave alone or drive yourself home. Your surgery will be cancelled if you do not have a ride home. Also for your safety, it is strongly suggested that someone stay with you the first 24 hours after your surgery. A parent or legal guardian must accompany a child scheduled for surgery and plan to stay at the hospital until the child is discharged. Please do not bring other children with you. For your comfort, please wear simple loose fitting clothing to the hospital.  Please do not bring valuables.     Do not wear any make-up or nail polish on your fingers or toes      For your safety, please do not wear any jewelry or body piercing's on the day of surgery. All jewelry must be removed. If you have dentures, they will be removed before going to operating room. For your convenience, we will provide you with a container. If you wear contact lenses or glasses, they will be removed, please bring a case for them. If you have a living will and a durable power of  for healthcare, please bring in a copy. As part of our patient safety program to minimize surgical site infections, we ask you to do the following:    · Please notify your surgeon if you develop any illness between         now and the  day of your surgery. · This includes a cough, cold, fever, sore throat, nausea,         or vomiting, and diarrhea, etc.  ·  Please notify your surgeon if you experience dizziness, shortness         of breath or blurred vision between now and the time of your surgery. Do not shave your operative site 96 hours prior to surgery. For face and neck surgery, men may use an electric razor 48 hours   prior to surgery. You may shower the night before surgery or the morning of   your surgery with an antibacterial soap. You will need to bring a photo ID and insurance card    [facility] has an onsite pharmacy, would you like to utilize our pharmacy     If you will be staying overnight and use a C-pap machine, please bring   your C-pap to hospital     Our goal is to provide you with excellent care, therefore, visitors will be limited to two(2) in the room at a time so that we may focus on providing this care for you. Please contact pre-admission testing if you have any further questions. [facility] phone number:  [phone]  Please note these are generalized instructions for all surgical cases, you may be provided with more specific instructions according to your surgery. ,    [phone]

## 2021-11-03 ENCOUNTER — APPOINTMENT (OUTPATIENT)
Dept: COLORECTAL SURGERY | Facility: CLINIC | Age: 71
End: 2021-11-03
Payer: MEDICARE

## 2021-11-03 PROCEDURE — 99212 OFFICE O/P EST SF 10 MIN: CPT

## 2021-11-03 NOTE — HISTORY OF PRESENT ILLNESS
[FreeTextEntry1] : 71-year-old female who is well known to me.\par \par I performed a resection for complicated diverticulitis with a temporary diverting loop ileostomy months ago. The ileostomy was eventually closed. Generally the patient has done quite well after this operation. She presents today with a complaint of  "pinching" in her right lower quadrant/groin region. Her bowel function is good.\par \par Physical examination reveals no overt incisional hernias.\par \par I'm not quite clear what the etiology of her discomfort is. We will obtain a CAT scan of the abdomen and pelvis to rule out an occult hernia or other pathology to explain her discomfort.

## 2021-11-04 RX ORDER — PREDNISONE 50 MG/1
50 TABLET ORAL
Qty: 3 | Refills: 0 | Status: ACTIVE | COMMUNITY
Start: 2021-11-04 | End: 1900-01-01

## 2021-11-04 RX ORDER — DIPHENHYDRAMINE HCL 50 MG/1
50 CAPSULE ORAL
Qty: 1 | Refills: 0 | Status: ACTIVE | COMMUNITY
Start: 2021-11-04 | End: 1900-01-01

## 2021-11-10 ENCOUNTER — APPOINTMENT (OUTPATIENT)
Dept: CT IMAGING | Facility: CLINIC | Age: 71
End: 2021-11-10
Payer: MEDICARE

## 2021-11-10 ENCOUNTER — RESULT REVIEW (OUTPATIENT)
Age: 71
End: 2021-11-10

## 2021-11-10 ENCOUNTER — OUTPATIENT (OUTPATIENT)
Dept: OUTPATIENT SERVICES | Facility: HOSPITAL | Age: 71
LOS: 1 days | End: 2021-11-10
Payer: MEDICARE

## 2021-11-10 DIAGNOSIS — Z98.890 OTHER SPECIFIED POSTPROCEDURAL STATES: Chronic | ICD-10-CM

## 2021-11-10 DIAGNOSIS — Z90.721 ACQUIRED ABSENCE OF OVARIES, UNILATERAL: Chronic | ICD-10-CM

## 2021-11-10 DIAGNOSIS — Z90.79 ACQUIRED ABSENCE OF OTHER GENITAL ORGAN(S): Chronic | ICD-10-CM

## 2021-11-10 DIAGNOSIS — Z00.8 ENCOUNTER FOR OTHER GENERAL EXAMINATION: ICD-10-CM

## 2021-11-10 DIAGNOSIS — Z98.891 HISTORY OF UTERINE SCAR FROM PREVIOUS SURGERY: Chronic | ICD-10-CM

## 2021-11-10 PROCEDURE — 74177 CT ABD & PELVIS W/CONTRAST: CPT | Mod: 26,MH

## 2021-11-10 PROCEDURE — 74177 CT ABD & PELVIS W/CONTRAST: CPT | Mod: MH

## 2021-11-10 PROCEDURE — 82565 ASSAY OF CREATININE: CPT

## 2021-11-17 ENCOUNTER — LABORATORY RESULT (OUTPATIENT)
Age: 71
End: 2021-11-17

## 2021-11-18 LAB
BASOPHILS # BLD AUTO: 0.08 K/UL
BASOPHILS NFR BLD AUTO: 1 %
EOSINOPHIL # BLD AUTO: 0.28 K/UL
EOSINOPHIL NFR BLD AUTO: 3.7 %
FERRITIN SERPL-MCNC: 43 NG/ML
FOLATE SERPL-MCNC: >20 NG/ML
HCT VFR BLD CALC: 29.8 %
HGB BLD-MCNC: 9.1 G/DL
IMM GRANULOCYTES NFR BLD AUTO: 0.3 %
IRON SATN MFR SERPL: 87 %
IRON SERPL-MCNC: 279 UG/DL
LYMPHOCYTES # BLD AUTO: 1.53 K/UL
LYMPHOCYTES NFR BLD AUTO: 20 %
MAN DIFF?: NORMAL
MCHC RBC-ENTMCNC: 19.8 PG
MCHC RBC-ENTMCNC: 30.5 GM/DL
MCV RBC AUTO: 64.8 FL
MONOCYTES # BLD AUTO: 0.74 K/UL
MONOCYTES NFR BLD AUTO: 9.7 %
NEUTROPHILS # BLD AUTO: 5 K/UL
NEUTROPHILS NFR BLD AUTO: 65.3 %
PLATELET # BLD AUTO: 493 K/UL
RBC # BLD: 4.6 M/UL
RBC # BLD: 4.6 M/UL
RBC # FLD: 15.5 %
RETICS # AUTO: 1.8 %
RETICS AGGREG/RBC NFR: 83.6 K/UL
TIBC SERPL-MCNC: 322 UG/DL
UIBC SERPL-MCNC: 43 UG/DL
VIT B12 SERPL-MCNC: 573 PG/ML
WBC # FLD AUTO: 7.65 K/UL

## 2021-11-22 ENCOUNTER — OUTPATIENT (OUTPATIENT)
Dept: OUTPATIENT SERVICES | Facility: HOSPITAL | Age: 71
LOS: 1 days | Discharge: HOME | End: 2021-11-22

## 2021-11-22 DIAGNOSIS — D50.9 IRON DEFICIENCY ANEMIA, UNSPECIFIED: ICD-10-CM

## 2021-11-22 DIAGNOSIS — Z98.890 OTHER SPECIFIED POSTPROCEDURAL STATES: Chronic | ICD-10-CM

## 2021-11-22 DIAGNOSIS — Z98.891 HISTORY OF UTERINE SCAR FROM PREVIOUS SURGERY: Chronic | ICD-10-CM

## 2021-11-22 DIAGNOSIS — Z90.79 ACQUIRED ABSENCE OF OTHER GENITAL ORGAN(S): Chronic | ICD-10-CM

## 2021-11-22 DIAGNOSIS — Z90.721 ACQUIRED ABSENCE OF OVARIES, UNILATERAL: Chronic | ICD-10-CM

## 2021-11-23 ENCOUNTER — APPOINTMENT (OUTPATIENT)
Dept: HEMATOLOGY ONCOLOGY | Facility: CLINIC | Age: 71
End: 2021-11-23
Payer: MEDICARE

## 2021-11-23 VITALS
WEIGHT: 162.48 LBS | BODY MASS INDEX: 25.5 KG/M2 | OXYGEN SATURATION: 100 % | HEART RATE: 75 BPM | RESPIRATION RATE: 18 BRPM | TEMPERATURE: 97.9 F | SYSTOLIC BLOOD PRESSURE: 186 MMHG | DIASTOLIC BLOOD PRESSURE: 83 MMHG | HEIGHT: 67.01 IN

## 2021-11-23 PROCEDURE — 99213 OFFICE O/P EST LOW 20 MIN: CPT

## 2021-11-23 NOTE — ASSESSMENT
[FreeTextEntry1] : Lab work reviewed with patient.\par #1) Anemia/iron deficiency-h/o diverticulosis/diverticulitis with GI bleeding followed by surgery.  In addition, prior episode of hematuria in 5/2021.\par No overt bleeding noted clinically at present. Though thalassemia contributing to baseline anemia, with decrease in hemoglobin, recommend GI follow-up for further GI evaluation to include possible EGD. Patient has declined small bowel study. \par She will continue with PO iron supplement for now, with interval f/u labs ordered.\par \par #2)  History of thalassemia–beta thal. minor, with baseline hemoglobin ~10 per patient.\par \par #3) history of thrombocytosis–clinically suspect represents a reactive process currently.  Platelet count appears to fluctuate. TONO 2 mutation study normal. CALR, MPL studies normal. Continue to monitor CBC.\par \par Should hematologic scenario change/worsen, can decide regarding further evaluation such as BMB, which was d/w patient today, to r/o underlying evolving BM disorder.\par \par Patient was given the opportunity to ask questions.  Her questions have been answered to her apparent satisfaction at this time.  She expressed her understanding and willingness to comply with recommended follow-up.

## 2021-11-23 NOTE — HISTORY OF PRESENT ILLNESS
[de-identified] : Had 1 year of "digestive issues." 9/2020–Colonoscopy revealed a stricture of benign intrinsic appearance in the sigmoid colon.  Diverticular disease was severe.  Diverticular associated colitis noted.  \par 10/2020–CT scan abdomen/pelvis compared to 9/2020 showed persistent marked inflammation and wall thickening of the sigmoid colon in association with innumerable diverticuli consistent with diverticulitis.  The inflammation extended to the uterus and involved adjacent bowel loops.  \par 11/2020–Patient underwent left colon and rectosigmoid anterior resection with pathology revealing diverticulosis with diverticulitis. Had pelvic abscess. Right adnexa, salpingo-oophorectomy performed with pathology revealing serositis.  Endometrial curettings negative for malignancy.\par 1/2021–Patient underwent closure of a diverting loop ileostomy which had been constructed to protect an at risk colorectal anastomosis done after resection of complicated diverticulitis. 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 \par However, noted an acute drop 6/3/2021 to a hemoglobin of 7.4.  Iron deficiency noted with a ferritin of 7. S/P IV Feraheme.\par  [de-identified] : Saw Dr. Marx for occasional pinching abdominal discomfort. Had CT A/P which was unremarkable. S/P "stomach virus"-symptoms have improved.\par Last Feraheme infusion 7/2/21. \par Taking ferrous gluconate-1 tab daily.\par Patient has no current complaints of chest pain, shortness of breath. No blood per rectum or melena reported, or vaginal bleeding.  No nausea/vomiting, abdominal or pelvic pain. No fevers.  No lymph node complaints.\par Takes Metamucil daily per Dr. Marx.\par Sees breast surgeon Dr. Thornton in regular f/u for h/o breast DCIS.\par Up to date on GYN exam.\par \par Got Covid vaccines (Pfizer)-first dose 3/2/21/second dose 3/23/21. had third shot 11/18/21.

## 2021-11-24 ENCOUNTER — RX RENEWAL (OUTPATIENT)
Age: 71
End: 2021-11-24

## 2022-01-06 ENCOUNTER — LABORATORY RESULT (OUTPATIENT)
Age: 72
End: 2022-01-06

## 2022-01-06 LAB
HAPTOGLOB SERPL-MCNC: 102 MG/DL
RBC # BLD: 4.88 M/UL
RETICS # AUTO: 1.6 %
RETICS AGGREG/RBC NFR: 79.4 K/UL

## 2022-01-07 LAB
BASOPHILS # BLD AUTO: 0.06 K/UL
BASOPHILS NFR BLD AUTO: 1 %
EOSINOPHIL # BLD AUTO: 0.22 K/UL
EOSINOPHIL NFR BLD AUTO: 3.5 %
FERRITIN SERPL-MCNC: 25 NG/ML
FOLATE SERPL-MCNC: >20 NG/ML
HCT VFR BLD CALC: 31.5 %
HGB BLD-MCNC: 9.4 G/DL
IMM GRANULOCYTES NFR BLD AUTO: 0.5 %
IRON SATN MFR SERPL: 10 %
IRON SERPL-MCNC: 35 UG/DL
LYMPHOCYTES # BLD AUTO: 1.34 K/UL
LYMPHOCYTES NFR BLD AUTO: 21.3 %
MAN DIFF?: NORMAL
MCHC RBC-ENTMCNC: 19.3 PG
MCHC RBC-ENTMCNC: 29.8 GM/DL
MCV RBC AUTO: 64.5 FL
MONOCYTES # BLD AUTO: 0.37 K/UL
MONOCYTES NFR BLD AUTO: 5.9 %
NEUTROPHILS # BLD AUTO: 4.27 K/UL
NEUTROPHILS NFR BLD AUTO: 67.8 %
PLATELET # BLD AUTO: 484 K/UL
RBC # BLD: 4.88 M/UL
RBC # FLD: 16.6 %
TIBC SERPL-MCNC: 353 UG/DL
UIBC SERPL-MCNC: 318 UG/DL
VIT B12 SERPL-MCNC: 568 PG/ML
WBC # FLD AUTO: 6.29 K/UL

## 2022-01-10 ENCOUNTER — OUTPATIENT (OUTPATIENT)
Dept: OUTPATIENT SERVICES | Facility: HOSPITAL | Age: 72
LOS: 1 days | Discharge: HOME | End: 2022-01-10

## 2022-01-10 DIAGNOSIS — D50.9 IRON DEFICIENCY ANEMIA, UNSPECIFIED: ICD-10-CM

## 2022-01-10 DIAGNOSIS — Z90.721 ACQUIRED ABSENCE OF OVARIES, UNILATERAL: Chronic | ICD-10-CM

## 2022-01-10 DIAGNOSIS — Z90.79 ACQUIRED ABSENCE OF OTHER GENITAL ORGAN(S): Chronic | ICD-10-CM

## 2022-01-10 DIAGNOSIS — Z98.890 OTHER SPECIFIED POSTPROCEDURAL STATES: Chronic | ICD-10-CM

## 2022-01-10 DIAGNOSIS — Z98.891 HISTORY OF UTERINE SCAR FROM PREVIOUS SURGERY: Chronic | ICD-10-CM

## 2022-01-11 ENCOUNTER — APPOINTMENT (OUTPATIENT)
Dept: HEMATOLOGY ONCOLOGY | Facility: CLINIC | Age: 72
End: 2022-01-11
Payer: MEDICARE

## 2022-01-11 PROCEDURE — 99213 OFFICE O/P EST LOW 20 MIN: CPT | Mod: 95

## 2022-01-11 NOTE — HISTORY OF PRESENT ILLNESS
[de-identified] : Had 1 year of "digestive issues." 9/2020–Colonoscopy revealed a stricture of benign intrinsic appearance in the sigmoid colon.  Diverticular disease was severe.  Diverticular associated colitis noted.  \par 10/2020–CT scan abdomen/pelvis compared to 9/2020 showed persistent marked inflammation and wall thickening of the sigmoid colon in association with innumerable diverticuli consistent with diverticulitis.  The inflammation extended to the uterus and involved adjacent bowel loops.  \par 11/2020–Patient underwent left colon and rectosigmoid anterior resection with pathology revealing diverticulosis with diverticulitis. Had pelvic abscess. Right adnexa, salpingo-oophorectomy performed with pathology revealing serositis.  Endometrial curettings negative for malignancy.\par 1/2021–Patient underwent closure of a diverting loop ileostomy which had been constructed to protect an at risk colorectal anastomosis done after resection of complicated diverticulitis. 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 \par However, noted an acute drop 6/3/2021 to a hemoglobin of 7.4.  Iron deficiency noted with a ferritin of 7. S/P IV Feraheme.\par  [de-identified] : Has not seen GI MD in f/u since last visit.\par Last Feraheme infusion 7/2/21. \par Taking ferrous gluconate-1 tab daily.\par Patient has no current complaints of chest pain, shortness of breath. No blood per rectum or melena reported, or vaginal bleeding.  No nausea/vomiting, abdominal or pelvic pain. No fevers.  No lymph node complaints.\par Takes Metamucil daily per Dr. Marx.\par Sees breast surgeon Dr. Thornton in regular f/u for h/o breast DCIS.\par \par Got Covid vaccines (Pfizer)-first dose 3/2/21/second dose 3/23/21. Had third shot 11/18/21.

## 2022-01-11 NOTE — REASON FOR VISIT
[Home] : at home, [unfilled] , at the time of the visit. [Medical Office: ([facility])___] : at the medical office located in  [Verbal consent obtained from patient] : the patient, [unfilled] [Follow-Up Visit] : a follow-up visit for [FreeTextEntry2] : anemia

## 2022-01-11 NOTE — PHYSICAL EXAM
[Normal] : affect appropriate [de-identified] : breathing appeared unlabored [de-identified] : coloration appeared normal

## 2022-01-11 NOTE — CONSULT LETTER
[Dear  ___] : Dear  [unfilled], [Courtesy Letter:] : I had the pleasure of seeing your patient, [unfilled], in my office today. [Please see my note below.] : Please see my note below. [Consult Closing:] : Thank you very much for allowing me to participate in the care of this patient.  If you have any questions, please do not hesitate to contact me. [Sincerely,] : Sincerely, [FreeTextEntry3] : Court Hunt MD

## 2022-01-11 NOTE — ASSESSMENT
[FreeTextEntry1] : Lab work reviewed with patient.\par #1) Anemia/iron deficiency-h/o diverticulosis/diverticulitis with GI bleeding followed by surgery.  In addition, prior episode of hematuria in 5/2021.\par No overt bleeding reported at present. Though thalassemia contributing to baseline anemia, with decrease in hemoglobin, had recommended GI follow-up for further GI evaluation to include possible EGD. Patient has declined small bowel study due to multiple prior bowel surgeries. As hemoglobin is stable, patient is deferring GI f/u for now-may reconsider if no further improvement in anemia on short interval f/u lab work.\par She will continue with PO iron supplement for now, and increase to BID.  If becomes intolerant to p.o. iron supplement, can consider resumption of parenteral iron.\par \par #2)  History of thalassemia–beta thal. minor, with baseline hemoglobin ~10 per patient.\par \par #3) history of thrombocytosis–clinically suspect represents a reactive process currently.  Platelet count appears to fluctuate. TONO 2 mutation study normal. CALR, MPL studies normal. Continue to monitor CBC.\par \par Should hematologic scenario change/worsen, can decide regarding further evaluation such as BMB, to r/o underlying evolving BM disorder.\par \par Patient was given the opportunity to ask questions.  Her questions have been answered to her apparent satisfaction at this time.  She expressed her understanding and willingness to comply with recommended follow-up.

## 2022-01-14 ENCOUNTER — TRANSCRIPTION ENCOUNTER (OUTPATIENT)
Age: 72
End: 2022-01-14

## 2022-02-01 NOTE — H&P PST ADULT - I HAVE PERSONALLY SEEN AND EXAMINED THE PATIENT. THERE HAVE NOT BEEN ANY CHANGES IN THE PATIENT'S HISTORY OR EXAM UNLESS COMMENTED BELOW
Assessment/Plan:     Diagnoses and all orders for this visit:    1. Well woman exam with routine gynecological exam  -     URINALYSIS W/ RFLX MICROSCOPIC; Future  - Stable, preventative screenings discussed and ordered labs today plan for follow-up in 4 weeks for blood pressure check    2. Prediabetes  -     HEMOGLOBIN A1C WITH EAG; Future  -     CBC WITH AUTOMATED DIFF; Future  - Presumed stable labs today    3. Essential hypertension  -     METABOLIC PANEL, COMPREHENSIVE; Future  - Elevated, continue current therapy, labs today. Monitor blood pressure at home and follow-up in 4 weeks    4. Encounter for screening mammogram for malignant neoplasm of breast  -     WEST MAMMO BI SCREENING INCL CAD; Future    5. Screening for colon cancer  -     REFERRAL TO GASTROENTEROLOGY    6. Family history of breast cancer    7. Hyperlipidemia, unspecified hyperlipidemia type  -     LIPID PANEL; Future  -     TSH 3RD GENERATION; Future  - Presumed stable labs today    8. Screening for malignant neoplasm of the cervix  -     PAP IG, APTIMA HPV AND RFX 16/18,45 ([id number])        Follow-up and Dispositions    · Return in about 6 months (around 8/1/2022) for Follow Up. Discussed expected course/resolution/complications of diagnosis in detail with patient. Medication risks/benefits/costs/interactions/alternatives discussed with patient. Pt was given after visit summary which includes diagnoses, current medications & vitals. Pt expressed understanding with the diagnosis and plan        Subjective:      Susie Amaya is a 63 y.o. female who presents for had concerns including Other (Requesting a referral for colonoscopy/mammogram). Here today for annual CPE. Needs mammogram order.   Mother just diagnosed with lobular breast cancer  Is Pap smear done denies abnormal Paps in the past  Needs colonoscopy order  Sees dentist every 6 months  Vision exam up-to-date    Current Outpatient Medications   Medication Sig Dispense Refill    atenoloL (TENORMIN) 50 mg tablet TAKE 1 TABLET BY MOUTH EVERY DAY 90 Tablet 1    hydroCHLOROthiazide (HYDRODIURIL) 25 mg tablet TAKE 1 TABLET BY MOUTH EVERY DAY 90 Tablet 1    CALCIUM PO Take  by mouth. (Patient not taking: Reported on 5/19/2021)         No Known Allergies  Past Medical History:   Diagnosis Date    STALIN (generalized anxiety disorder)     HTN (hypertension)      History reviewed. No pertinent surgical history. Family History   Problem Relation Age of Onset    Colon Cancer Mother     Hypertension Father     Prostate Cancer Father     Diabetes Father      Social History     Socioeconomic History    Marital status:      Spouse name: Not on file    Number of children: Not on file    Years of education: Not on file    Highest education level: Not on file   Occupational History    Not on file   Tobacco Use    Smoking status: Never Smoker    Smokeless tobacco: Never Used   Vaping Use    Vaping Use: Never used   Substance and Sexual Activity    Alcohol use: No    Drug use: No    Sexual activity: Not Currently   Other Topics Concern    Not on file   Social History Narrative    Not on file     Social Determinants of Health     Financial Resource Strain:     Difficulty of Paying Living Expenses: Not on file   Food Insecurity:     Worried About [address] in the Last Year: Not on file    Sy of Food in the Last Year: Not on file   Transportation Needs:     Lack of Transportation (Medical): Not on file    Lack of Transportation (Non-Medical):  Not on file   Physical Activity:     Days of Exercise per Week: Not on file    Minutes of Exercise per Session: Not on file   Stress:     Feeling of Stress : Not on file   Social Connections:     Frequency of Communication with Friends and Family: Not on file    Frequency of Social Gatherings with Friends and Family: Not on file    Attends Religion Services: Not on file   [employer]s or Organizations: Not on file    Attends Club or Organization Meetings: Not on file    Marital Status: Not on file   Intimate Partner Violence:     Fear of Current or Ex-Partner: Not on file    Emotionally Abused: Not on file    Physically Abused: Not on file    Sexually Abused: Not on file   Housing Stability:     Unable to Pay for Housing in the Last Year: Not on file    Number of [place] in the Last Year: Not on file    Unstable Housing in the Last Year: Not on file       HPI      ROS:   Review of Systems   Constitutional: Negative for chills, fever and weight loss. Eyes: Negative for blurred vision. Respiratory: Negative for cough and shortness of breath. Cardiovascular: Negative for chest pain and palpitations. Gastrointestinal: Negative for constipation, nausea and vomiting. Genitourinary: Negative for dysuria. Musculoskeletal: Negative for joint pain. Skin: Negative for rash. Neurological: Negative for dizziness and headaches. Psychiatric/Behavioral: Negative for substance abuse and suicidal ideas. The patient does not have insomnia. Objective:     Visit Vitals  BP (!) 144/84 (BP 1 Location: Right upper arm, BP Patient Position: Sitting, BP Cuff Size: Adult)   Pulse 68   Temp 97.1 °F (36.2 °C) (Temporal)   Resp 16   Ht 5' 1.5\" (1.562 m)   Wt 166 lb 9.6 oz (75.6 kg)   SpO2 98%   BMI 30.97 kg/m²         Vitals and Nurse Documentation reviewed. Physical Exam  Exam conducted with a chaperone present. Constitutional:       Appearance: Normal appearance. HENT:      Head: Normocephalic and atraumatic. Right Ear: Tympanic membrane normal.      Left Ear: Tympanic membrane normal.      Nose: Nose normal.      Mouth/Throat:      Dentition: Normal dentition. Eyes:      General:         Right eye: No discharge. Left eye: No discharge. Conjunctiva/sclera:      Right eye: Right conjunctiva is not injected. Left eye: Left conjunctiva is not injected. Pupils: Pupils are equal, round, and reactive to light. Neck:      Thyroid: No thyroid mass or thyromegaly. Vascular: No carotid bruit. Cardiovascular:      Rate and Rhythm: Normal rate and regular rhythm. Pulses:           Dorsalis pedis pulses are 2+ on the right side and 2+ on the left side. Posterior tibial pulses are 2+ on the right side and 2+ on the left side. Heart sounds: S1 normal and S2 normal. No murmur heard. No friction rub. No gallop. Pulmonary:      Breath sounds: Normal breath sounds. Chest:   Breasts:      Right: Normal. No swelling, bleeding, inverted nipple, mass, nipple discharge, skin change or tenderness. Left: Normal. No swelling, bleeding, inverted nipple, mass, nipple discharge, skin change or tenderness. Abdominal:      General: Bowel sounds are normal. There is no distension. Palpations: There is no mass. Tenderness: There is no abdominal tenderness. Genitourinary:     Vagina: Normal.      Cervix: Normal.      Uterus: Normal.       Adnexa:         Right: No tenderness. Left: No tenderness. Musculoskeletal:         General: Normal range of motion. Cervical back: Neck supple. Lymphadenopathy:      Cervical: No cervical adenopathy. Skin:     General: Skin is warm and dry. Findings: No rash. Neurological:      Mental Status: She is alert. Sensory: Sensation is intact. Gait: Gait is intact.  Gait normal.   Psychiatric:         Mood and Affect: Mood and affect normal.         Results for orders placed or performed in visit on 02/01/22   URINALYSIS W/ RFLX MICROSCOPIC   Result Value Ref Range    Color YELLOW/STRAW      Appearance CLEAR CLEAR      Specific gravity 1.019 1.003 - 1.030      pH (UA) 7.0 5.0 - 8.0      Protein Negative Negative mg/dL    Glucose Negative Negative mg/dL    Ketone Negative Negative mg/dL    Bilirubin Negative Negative      Blood TRACE (A) Negative      Urobilinogen 0.2 0.2 - 1.0 EU/dL    Nitrites Negative Negative      Leukocyte Esterase Negative Negative      WBC 0-4 0 - 4 /hpf    RBC 5-10 0 - 5 /hpf    Epithelial cells FEW FEW /lpf    Bacteria Negative Negative /hpf    Hyaline cast 0-2 0 - 5 /lpf   TSH 3RD GENERATION   Result Value Ref Range    TSH 1.20 0.36 - 7.49 uIU/mL   METABOLIC PANEL, COMPREHENSIVE   Result Value Ref Range    Sodium 139 136 - 145 mmol/L    Potassium 3.8 3.5 - 5.1 mmol/L    Chloride 102 97 - 108 mmol/L    CO2 31 21 - 32 mmol/L    Anion gap 6 5 - 15 mmol/L    Glucose 94 65 - 100 mg/dL    BUN 15 6 - 20 MG/DL    Creatinine 0.91 0.55 - 1.02 MG/DL    BUN/Creatinine ratio 16 12 - 20      GFR est AA >60 >60 ml/min/1.73m2    GFR est non-AA >60 >60 ml/min/1.73m2    Calcium 9.8 8.5 - 10.1 MG/DL    Bilirubin, total 0.7 0.2 - 1.0 MG/DL    ALT (SGPT) 30 12 - 78 U/L    AST (SGOT) 20 15 - 37 U/L    Alk. phosphatase 76 45 - 117 U/L    Protein, total 7.6 6.4 - 8.2 g/dL    Albumin 3.8 3.5 - 5.0 g/dL    Globulin 3.8 2.0 - 4.0 g/dL    A-G Ratio 1.0 (L) 1.1 - 2.2     LIPID PANEL   Result Value Ref Range    Cholesterol, total 228 (H) <200 MG/DL    Triglyceride 77 <150 MG/DL    HDL Cholesterol 60 MG/DL    LDL, calculated 152.6 (H) 0 - 100 MG/DL    VLDL, calculated 15.4 MG/DL    CHOL/HDL Ratio 3.8 0.0 - 5.0     CBC WITH AUTOMATED DIFF   Result Value Ref Range    WBC 3.7 3.6 - 11.0 K/uL    RBC 5.10 3.80 - 5.20 M/uL    HGB 13.9 11.5 - 16.0 g/dL    HCT 43.6 35.0 - 47.0 %    MCV 85.5 80.0 - 99.0 FL    MCH 27.3 26.0 - 34.0 PG    MCHC 31.9 30.0 - 36.5 g/dL    RDW 12.7 11.5 - 14.5 %    PLATELET 610 882 - 662 K/uL    MPV 8.8 (L) 8.9 - 12.9 FL    NRBC 0.0 0  WBC    ABSOLUTE NRBC 0.00 0.00 - 0.01 K/uL    NEUTROPHILS 35 32 - 75 %    LYMPHOCYTES 52 (H) 12 - 49 %    MONOCYTES 8 5 - 13 %    EOSINOPHILS 3 0 - 7 %    BASOPHILS 2 (H) 0 - 1 %    IMMATURE GRANULOCYTES 0 0.0 - 0.5 %    ABS. NEUTROPHILS 1.3 (L) 1.8 - 8.0 K/UL    ABS. LYMPHOCYTES 2.0 0.8 - 3.5 K/UL    ABS.  MONOCYTES 0.3 0.0 - 1.0 K/UL ABS. EOSINOPHILS 0.1 0.0 - 0.4 K/UL    ABS. BASOPHILS 0.1 0.0 - 0.1 K/UL    ABS. IMM.  GRANS. 0.0 0.00 - 0.04 K/UL    DF AUTOMATED     HEMOGLOBIN A1C WITH EAG   Result Value Ref Range    Hemoglobin A1c 5.8 (H) 4.0 - 5.6 %    Est. average glucose 120 mg/dL Statement Selected

## 2022-02-24 ENCOUNTER — TRANSCRIPTION ENCOUNTER (OUTPATIENT)
Age: 72
End: 2022-02-24

## 2022-02-28 ENCOUNTER — LABORATORY RESULT (OUTPATIENT)
Age: 72
End: 2022-02-28

## 2022-02-28 LAB
BASOPHILS # BLD AUTO: 0.08 K/UL
BASOPHILS NFR BLD AUTO: 1.2 %
EOSINOPHIL # BLD AUTO: 0.24 K/UL
EOSINOPHIL NFR BLD AUTO: 3.7 %
FERRITIN SERPL-MCNC: 31 NG/ML
HCT VFR BLD CALC: 31.4 %
HGB BLD-MCNC: 9.4 G/DL
IMM GRANULOCYTES NFR BLD AUTO: 0.5 %
IRON SATN MFR SERPL: 11 %
IRON SERPL-MCNC: 38 UG/DL
LYMPHOCYTES # BLD AUTO: 1.1 K/UL
LYMPHOCYTES NFR BLD AUTO: 16.8 %
MAN DIFF?: NORMAL
MCHC RBC-ENTMCNC: 19.5 PG
MCHC RBC-ENTMCNC: 29.9 GM/DL
MCV RBC AUTO: 65.1 FL
MONOCYTES # BLD AUTO: 0.46 K/UL
MONOCYTES NFR BLD AUTO: 7 %
NEUTROPHILS # BLD AUTO: 4.64 K/UL
NEUTROPHILS NFR BLD AUTO: 70.8 %
PLATELET # BLD AUTO: 451 K/UL
RBC # BLD: 4.82 M/UL
RBC # BLD: 4.82 M/UL
RBC # FLD: 16.9 %
RETICS # AUTO: 2 %
RETICS AGGREG/RBC NFR: 98.9 K/UL
TIBC SERPL-MCNC: 348 UG/DL
UIBC SERPL-MCNC: 310 UG/DL
WBC # FLD AUTO: 6.55 K/UL

## 2022-03-01 ENCOUNTER — OUTPATIENT (OUTPATIENT)
Dept: OUTPATIENT SERVICES | Facility: HOSPITAL | Age: 72
LOS: 1 days | Discharge: HOME | End: 2022-03-01

## 2022-03-01 DIAGNOSIS — Z98.890 OTHER SPECIFIED POSTPROCEDURAL STATES: Chronic | ICD-10-CM

## 2022-03-01 DIAGNOSIS — Z90.721 ACQUIRED ABSENCE OF OVARIES, UNILATERAL: Chronic | ICD-10-CM

## 2022-03-01 DIAGNOSIS — Z98.891 HISTORY OF UTERINE SCAR FROM PREVIOUS SURGERY: Chronic | ICD-10-CM

## 2022-03-01 DIAGNOSIS — D50.9 IRON DEFICIENCY ANEMIA, UNSPECIFIED: ICD-10-CM

## 2022-03-01 DIAGNOSIS — Z90.79 ACQUIRED ABSENCE OF OTHER GENITAL ORGAN(S): Chronic | ICD-10-CM

## 2022-03-02 ENCOUNTER — APPOINTMENT (OUTPATIENT)
Dept: HEMATOLOGY ONCOLOGY | Facility: CLINIC | Age: 72
End: 2022-03-02
Payer: MEDICARE

## 2022-03-02 PROCEDURE — 99213 OFFICE O/P EST LOW 20 MIN: CPT | Mod: 95

## 2022-03-02 NOTE — HISTORY OF PRESENT ILLNESS
[de-identified] : Had 1 year of "digestive issues." 9/2020–Colonoscopy revealed a stricture of benign intrinsic appearance in the sigmoid colon.  Diverticular disease was severe.  Diverticular associated colitis noted.  \par 10/2020–CT scan abdomen/pelvis compared to 9/2020 showed persistent marked inflammation and wall thickening of the sigmoid colon in association with innumerable diverticuli consistent with diverticulitis.  The inflammation extended to the uterus and involved adjacent bowel loops.  \par 11/2020–Patient underwent left colon and rectosigmoid anterior resection with pathology revealing diverticulosis with diverticulitis. Had pelvic abscess. Right adnexa, salpingo-oophorectomy performed with pathology revealing serositis.  Endometrial curettings negative for malignancy.\par 1/2021–Patient underwent closure of a diverting loop ileostomy which had been constructed to protect an at risk colorectal anastomosis done after resection of complicated diverticulitis. 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 \par However, noted an acute drop 6/3/2021 to a hemoglobin of 7.4.  Iron deficiency noted with a ferritin of 7. S/P IV Feraheme.\par  [de-identified] : Taking PO iron ferrous gluconate 1-2 times/day.\par Has deferred seein GI MD in f/u, t/c EGD.\par Last Feraheme infusion 7/2/21. \par Patient has no current complaints of chest pain, shortness of breath. No blood per rectum or melena reported, or vaginal bleeding.  No nausea/vomiting, abdominal or pelvic pain. No fevers.  No lymph node complaints.\par Takes Metamucil daily per Dr. Marx.\par Sees breast surgeon Dr. Thornton in regular f/u for h/o breast DCIS.\par \par Got Covid vaccines (Pfizer)-first dose 3/2/21/second dose 3/23/21. Had third shot 11/18/21.

## 2022-03-02 NOTE — REASON FOR VISIT
[Home] : at home, [unfilled] , at the time of the visit. [Medical Office: ([facility])___] : at the medical office located in  [Verbal consent obtained from patient] : the patient, [unfilled] [Follow-Up Visit] : a follow-up visit for [FreeTextEntry2] : anemia

## 2022-03-02 NOTE — ASSESSMENT
[FreeTextEntry1] : Lab work reviewed.\par #1) Anemia/iron deficiency-h/o diverticulosis/diverticulitis with GI bleeding followed by surgery.  In addition, prior episode of hematuria in 5/2021.\par No overt bleeding reported at present. Though thalassemia contributing to baseline anemia, had recommended GI follow-up for further GI evaluation to include possible EGD. Patient has declined small bowel study due to multiple prior bowel surgeries. As hemoglobin is stable, patient is deferring GI f/u-she has stated she may reconsider if worsening of anemia on interval f/u lab work.\par She will continue with PO iron supplement for now.  If becomes intolerant to p.o. iron supplement, can consider resumption of parenteral iron.\par \par #2)  History of thalassemia–beta thal. minor, with baseline hemoglobin ~10 per patient.\par \par #3) history of thrombocytosis–clinically suspect represents a reactive process currently.  Platelet count appears to fluctuate. TONO 2 mutation study normal. CALR, MPL studies normal. Continue to monitor CBC.\par \par Should hematologic scenario change/worsen, can decide regarding further evaluation such as BMB, to r/o underlying evolving BM disorder.\par \par Patient was given the opportunity to ask questions.  Her questions have been answered to her apparent satisfaction at this time.  She expressed her understanding and willingness to comply with recommended follow-up.

## 2022-03-02 NOTE — PHYSICAL EXAM
[Normal] : affect appropriate [de-identified] : breathing appeared unlabored [de-identified] : coloration appeared normal

## 2022-04-28 ENCOUNTER — OUTPATIENT (OUTPATIENT)
Dept: OUTPATIENT SERVICES | Facility: HOSPITAL | Age: 72
LOS: 1 days | Discharge: HOME | End: 2022-04-28

## 2022-04-28 DIAGNOSIS — Z90.79 ACQUIRED ABSENCE OF OTHER GENITAL ORGAN(S): Chronic | ICD-10-CM

## 2022-04-28 DIAGNOSIS — Z98.891 HISTORY OF UTERINE SCAR FROM PREVIOUS SURGERY: Chronic | ICD-10-CM

## 2022-04-28 DIAGNOSIS — Z98.890 OTHER SPECIFIED POSTPROCEDURAL STATES: Chronic | ICD-10-CM

## 2022-04-28 DIAGNOSIS — Z90.721 ACQUIRED ABSENCE OF OVARIES, UNILATERAL: Chronic | ICD-10-CM

## 2022-04-28 DIAGNOSIS — D50.9 IRON DEFICIENCY ANEMIA, UNSPECIFIED: ICD-10-CM

## 2022-04-29 ENCOUNTER — NON-APPOINTMENT (OUTPATIENT)
Age: 72
End: 2022-04-29

## 2022-04-30 LAB
BASOPHILS # BLD AUTO: 0.05 K/UL
BASOPHILS NFR BLD AUTO: 0.5 %
EOSINOPHIL # BLD AUTO: 0.25 K/UL
EOSINOPHIL NFR BLD AUTO: 2.7 %
FERRITIN SERPL-MCNC: 27 NG/ML
FOLATE SERPL-MCNC: >20 NG/ML
HCT VFR BLD CALC: 28.6 %
HGB BLD-MCNC: 8.9 G/DL
IMM GRANULOCYTES NFR BLD AUTO: 0.3 %
IRON SATN MFR SERPL: 59 %
IRON SERPL-MCNC: 192 UG/DL
LYMPHOCYTES # BLD AUTO: 1.53 K/UL
LYMPHOCYTES NFR BLD AUTO: 16.7 %
MAN DIFF?: NORMAL
MCHC RBC-ENTMCNC: 19.8 PG
MCHC RBC-ENTMCNC: 31.1 GM/DL
MCV RBC AUTO: 63.7 FL
MONOCYTES # BLD AUTO: 0.66 K/UL
MONOCYTES NFR BLD AUTO: 7.2 %
NEUTROPHILS # BLD AUTO: 6.64 K/UL
NEUTROPHILS NFR BLD AUTO: 72.6 %
PLATELET # BLD AUTO: 446 K/UL
RBC # BLD: 4.49 M/UL
RBC # BLD: 4.49 M/UL
RBC # FLD: 16.5 %
RETICS # AUTO: 2.5 %
RETICS AGGREG/RBC NFR: 112.3 K/UL
TIBC SERPL-MCNC: 326 UG/DL
UIBC SERPL-MCNC: 133 UG/DL
VIT B12 SERPL-MCNC: 584 PG/ML
WBC # FLD AUTO: 9.16 K/UL

## 2022-05-03 ENCOUNTER — APPOINTMENT (OUTPATIENT)
Dept: HEMATOLOGY ONCOLOGY | Facility: CLINIC | Age: 72
End: 2022-05-03
Payer: MEDICARE

## 2022-05-03 PROCEDURE — 99213 OFFICE O/P EST LOW 20 MIN: CPT | Mod: 95

## 2022-05-03 NOTE — REASON FOR VISIT
[Follow-Up Visit] : a follow-up visit for [Home] : at home, [unfilled] , at the time of the visit. [Medical Office: ([facility])___] : at the medical office located in  [Verbal consent obtained from patient] : the patient, [unfilled] [FreeTextEntry2] : anemia

## 2022-05-03 NOTE — ASSESSMENT
[FreeTextEntry1] : Lab work reviewed.\par #1) Anemia/iron deficiency-h/o diverticulosis/diverticulitis with GI bleeding followed by surgery.  In addition, prior episode of hematuria in 5/2021.\par No overt bleeding reported at present. Though thalassemia contributing to baseline anemia, had recommended GI follow-up for further GI evaluation to include possible EGD. Patient has declined small bowel study due to multiple prior bowel surgeries, though with decreased hemoglobin, recommended she reconsider GI f/u, which she stated she will do.\par She will continue with PO iron supplement for now-1 tab daily.  If becomes intolerant to p.o. iron supplement, can consider resumption of parenteral iron.\par \par #2)  History of thalassemia–beta thal. minor, with baseline hemoglobin ~10 per patient.\par \par #3) history of thrombocytosis–clinically suspect represents a reactive process currently.  Platelet count appears to fluctuate. 2021 TONO 2 mutation study normal. CALR, MPL studies normal. Continue to monitor CBC.\par \par Should hematologic scenario change/worsen, can decide regarding further evaluation such as BMB, to r/o underlying evolving BM disorder.\par \par Patient was given the opportunity to ask questions.  Her questions have been answered to her apparent satisfaction at this time.  She expressed her understanding and willingness to comply with recommended follow-up.

## 2022-05-03 NOTE — HISTORY OF PRESENT ILLNESS
[de-identified] : Had 1 year of "digestive issues." 9/2020–Colonoscopy revealed a stricture of benign intrinsic appearance in the sigmoid colon.  Diverticular disease was severe.  Diverticular associated colitis noted.  \par 10/2020–CT scan abdomen/pelvis compared to 9/2020 showed persistent marked inflammation and wall thickening of the sigmoid colon in association with innumerable diverticuli consistent with diverticulitis.  The inflammation extended to the uterus and involved adjacent bowel loops.  \par 11/2020–Patient underwent left colon and rectosigmoid anterior resection with pathology revealing diverticulosis with diverticulitis. Had pelvic abscess. Right adnexa, salpingo-oophorectomy performed with pathology revealing serositis.  Endometrial curettings negative for malignancy.\par 1/2021–Patient underwent closure of a diverting loop ileostomy which had been constructed to protect an at risk colorectal anastomosis done after resection of complicated diverticulitis. 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 \par However, noted an acute drop 6/3/2021 to a hemoglobin of 7.4.  Iron deficiency noted with a ferritin of 7. S/P IV Feraheme.\par  [de-identified] : Taking PO iron ferrous gluconate 1 tab QD.\alexandrea Has deferred seeing GI MD in f/u, thus far, for EGD.\par Last Feraheme infusion 7/2/21. \par Patient has no current complaints of chest pain, shortness of breath. No blood per rectum or melena reported, or vaginal bleeding.  No nausea/vomiting, abdominal or pelvic pain. No fevers.  No lymph node complaints.\alexandrea Takes Metamucil daily per Dr. Marx.\alexandrea Sees breast surgeon Dr. Thornton in regular f/u for h/o breast DCIS.\alexandrea Has peridontal disease-has dental appt. the end of this month.\par \alexandrea Got Covid vaccines (Pfizer)-first dose 3/2/21/second dose 3/23/21. Had third shot 11/18/21.

## 2022-05-03 NOTE — PHYSICAL EXAM
[Normal] : affect appropriate [de-identified] : breathing appeared unlabored [de-identified] : coloration appeared normal

## 2022-05-09 ENCOUNTER — RX RENEWAL (OUTPATIENT)
Age: 72
End: 2022-05-09

## 2022-05-11 ENCOUNTER — TRANSCRIPTION ENCOUNTER (OUTPATIENT)
Age: 72
End: 2022-05-11

## 2022-06-07 ENCOUNTER — TRANSCRIPTION ENCOUNTER (OUTPATIENT)
Age: 72
End: 2022-06-07

## 2022-06-09 ENCOUNTER — TRANSCRIPTION ENCOUNTER (OUTPATIENT)
Age: 72
End: 2022-06-09

## 2022-06-10 ENCOUNTER — LABORATORY RESULT (OUTPATIENT)
Age: 72
End: 2022-06-10

## 2022-06-13 ENCOUNTER — TRANSCRIPTION ENCOUNTER (OUTPATIENT)
Age: 72
End: 2022-06-13

## 2022-06-20 ENCOUNTER — OUTPATIENT (OUTPATIENT)
Dept: OUTPATIENT SERVICES | Facility: HOSPITAL | Age: 72
LOS: 1 days | Discharge: HOME | End: 2022-06-20

## 2022-06-20 DIAGNOSIS — Z98.891 HISTORY OF UTERINE SCAR FROM PREVIOUS SURGERY: Chronic | ICD-10-CM

## 2022-06-20 DIAGNOSIS — Z98.890 OTHER SPECIFIED POSTPROCEDURAL STATES: Chronic | ICD-10-CM

## 2022-06-20 DIAGNOSIS — Z90.721 ACQUIRED ABSENCE OF OVARIES, UNILATERAL: Chronic | ICD-10-CM

## 2022-06-20 DIAGNOSIS — D50.9 IRON DEFICIENCY ANEMIA, UNSPECIFIED: ICD-10-CM

## 2022-06-20 DIAGNOSIS — Z90.79 ACQUIRED ABSENCE OF OTHER GENITAL ORGAN(S): Chronic | ICD-10-CM

## 2022-06-30 ENCOUNTER — APPOINTMENT (OUTPATIENT)
Dept: HEMATOLOGY ONCOLOGY | Facility: CLINIC | Age: 72
End: 2022-06-30

## 2022-06-30 PROCEDURE — 99214 OFFICE O/P EST MOD 30 MIN: CPT | Mod: 95

## 2022-06-30 NOTE — PHYSICAL EXAM
[Normal] : affect appropriate [de-identified] : breathing appeared unlabored [de-identified] : coloration appeared normal

## 2022-06-30 NOTE — HISTORY OF PRESENT ILLNESS
[de-identified] : Had 1 year of "digestive issues." 9/2020–Colonoscopy revealed a stricture of benign intrinsic appearance in the sigmoid colon.  Diverticular disease was severe.  Diverticular associated colitis noted.  \par 10/2020–CT scan abdomen/pelvis compared to 9/2020 showed persistent marked inflammation and wall thickening of the sigmoid colon in association with innumerable diverticuli consistent with diverticulitis.  The inflammation extended to the uterus and involved adjacent bowel loops.  \par 11/2020–Patient underwent left colon and rectosigmoid anterior resection with pathology revealing diverticulosis with diverticulitis. Had pelvic abscess. Right adnexa, salpingo-oophorectomy performed with pathology revealing serositis.  Endometrial curettings negative for malignancy.\par 1/2021–Patient underwent closure of a diverting loop ileostomy which had been constructed to protect an at risk colorectal anastomosis done after resection of complicated diverticulitis. 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 \par However, noted an acute drop 6/3/2021 to a hemoglobin of 7.4.  Iron deficiency noted with a ferritin of 7. S/P IV Feraheme.\par  [de-identified] : Taking PO iron ferrous gluconate- 1 tab QD.\par Had EGD-told "ok."\par Last Feraheme infusion 7/2/21. \par Patient has no current complaints of chest pain, shortness of breath. No blood per rectum or melena reported, or vaginal bleeding.  No nausea/vomiting, abdominal or pelvic pain. No fevers.  No lymph node complaints.\par Takes Metamucil daily per Dr. Marx.\par Sees breast surgeon Dr. Thornton in regular f/u for h/o breast DCIS.\par \par Got Covid vaccines (Pfizer)-first dose 3/2/21/second dose 3/23/21. Had third shot 11/18/21.

## 2022-06-30 NOTE — ASSESSMENT
[FreeTextEntry1] : Lab work, EGD report reviewed.\par #1) Anemia/iron deficiency-h/o diverticulosis/diverticulitis with GI bleeding followed by surgery.  In addition, prior episode of hematuria in 5/2021.\par No overt bleeding reported at present. Though thalassemia contributing to baseline anemia, Had recommended GI follow-up for further GI evaluation-had EGD. Patient has been declining small bowel study due to multiple prior bowel surgeries, though is now reconsidering, with a "test capsule" first.\par With persistent anemia, and decrease in iron, patient wishes for resumption of Feraheme, which will be scheduled.\par \par #2)  History of thalassemia–beta thal. minor, with baseline hemoglobin ~10 per patient.\par \par #3) history of thrombocytosis–clinically suspect represents a reactive process currently.  Platelet count appears to fluctuate. 2021 TONO 2 mutation study normal. CALR, MPL studies normal. Continue to monitor CBC.\par \par Should hematologic scenario change/worsen, can decide regarding further evaluation such as BMB, to r/o underlying evolving BM disorder.\par \par Patient was given the opportunity to ask questions.  Her questions have been answered to her apparent satisfaction at this time.  She expressed her understanding and willingness to comply with recommended follow-up.

## 2022-06-30 NOTE — REASON FOR VISIT
[Home] : at home, [unfilled] , at the time of the visit. [Medical Office: ([facility])___] : at the medical office located in  [Patient] : the patient [Self] : self [Follow-Up Visit] : a follow-up visit for [FreeTextEntry2] : anemia

## 2022-07-14 ENCOUNTER — APPOINTMENT (OUTPATIENT)
Dept: INFUSION THERAPY | Facility: HOSPITAL | Age: 72
End: 2022-07-14

## 2022-07-22 ENCOUNTER — APPOINTMENT (OUTPATIENT)
Dept: INFUSION THERAPY | Facility: HOSPITAL | Age: 72
End: 2022-07-22

## 2022-08-09 LAB — FERRITIN SERPL-MCNC: 488 NG/ML

## 2022-08-10 LAB
BASOPHILS # BLD AUTO: 0.06 K/UL
BASOPHILS NFR BLD AUTO: 0.7 %
EOSINOPHIL # BLD AUTO: 0.19 K/UL
EOSINOPHIL NFR BLD AUTO: 2.1 %
HCT VFR BLD CALC: 31 %
HGB BLD-MCNC: 9.7 G/DL
IMM GRANULOCYTES NFR BLD AUTO: 0.6 %
IRON SATN MFR SERPL: 26 %
IRON SERPL-MCNC: 75 UG/DL
LYMPHOCYTES # BLD AUTO: 1.34 K/UL
LYMPHOCYTES NFR BLD AUTO: 15.1 %
MAN DIFF?: NORMAL
MCHC RBC-ENTMCNC: 19.1 PG
MCHC RBC-ENTMCNC: 31.3 GM/DL
MCV RBC AUTO: 60.9 FL
MONOCYTES # BLD AUTO: 0.6 K/UL
MONOCYTES NFR BLD AUTO: 6.8 %
NEUTROPHILS # BLD AUTO: 6.63 K/UL
NEUTROPHILS NFR BLD AUTO: 74.7 %
PLATELET # BLD AUTO: 429 K/UL
RBC # BLD: 5.09 M/UL
RBC # BLD: 5.09 M/UL
RBC # FLD: 17.7 %
RETICS # AUTO: 1.9 %
RETICS AGGREG/RBC NFR: 94.9 K/UL
TIBC SERPL-MCNC: 285 UG/DL
UIBC SERPL-MCNC: 211 UG/DL
WBC # FLD AUTO: 8.87 K/UL

## 2022-08-11 ENCOUNTER — APPOINTMENT (OUTPATIENT)
Dept: HEMATOLOGY ONCOLOGY | Facility: CLINIC | Age: 72
End: 2022-08-11

## 2022-08-11 PROCEDURE — 99213 OFFICE O/P EST LOW 20 MIN: CPT | Mod: 95

## 2022-08-11 NOTE — ASSESSMENT
[FreeTextEntry1] : Lab work reviewed.\par #1) Anemia/iron deficiency-h/o diverticulosis/diverticulitis with GI bleeding followed by surgery.  In addition, prior episode of hematuria in 5/2021.\par No overt bleeding reported at present. Though thalassemia contributing to baseline anemia, Had recommended GI follow-up for further GI evaluation-had EGD. Patient has deferred small bowel study due to multiple prior bowel surgeries.\par With persistent anemia, and decrease in iron, patient wished for resumption of Feraheme-last dose 7/22/22. Ferritin increased and hemoglobin stable currently.\par \par #2)  History of thalassemia–beta thal. minor, with baseline hemoglobin ~10 per patient.\par \par #3) history of thrombocytosis–clinically suspect represents a reactive process at present.  Platelet count appears to fluctuate. 2021 TONO 2 mutation study normal. CALR, MPL studies normal. Continue to monitor CBC.\par \par Should hematologic scenario change/worsen, can decide regarding further evaluation such as BMB, to r/o underlying evolving BM disorder.\par \par Patient was given the opportunity to ask questions.  Her questions have been answered to her apparent satisfaction at this time.  She expressed her understanding and willingness to comply with recommended follow-up.

## 2022-08-11 NOTE — PHYSICAL EXAM
[Normal] : affect appropriate [de-identified] : breathing appeared unlabored [de-identified] : coloration appeared normal

## 2022-08-11 NOTE — HISTORY OF PRESENT ILLNESS
[de-identified] : Had 1 year of "digestive issues." 9/2020–Colonoscopy revealed a stricture of benign intrinsic appearance in the sigmoid colon.  Diverticular disease was severe.  Diverticular associated colitis noted.  \par 10/2020–CT scan abdomen/pelvis compared to 9/2020 showed persistent marked inflammation and wall thickening of the sigmoid colon in association with innumerable diverticuli consistent with diverticulitis.  The inflammation extended to the uterus and involved adjacent bowel loops.  \par 11/2020–Patient underwent left colon and rectosigmoid anterior resection with pathology revealing diverticulosis with diverticulitis. Had pelvic abscess. Right adnexa, salpingo-oophorectomy performed with pathology revealing serositis.  Endometrial curettings negative for malignancy.\par 1/2021–Patient underwent closure of a diverting loop ileostomy which had been constructed to protect an at risk colorectal anastomosis done after resection of complicated diverticulitis. 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 \par However, noted an acute drop 6/3/2021 to a hemoglobin of 7.4.  Iron deficiency noted with a ferritin of 7. PRN IV Feraheme.\par  [de-identified] : Received feraheme infusions-last 7/22/22. Improved energy.\par Taking PO iron ferrous gluconate- 1 tab QD. \par Patient has no current complaints of chest pain, shortness of breath. No blood per rectum or melena reported, or vaginal bleeding.  No nausea/vomiting, abdominal or pelvic pain. No fevers.  No lymph node complaints.\par Takes Metamucil daily per Dr. Marx.\par Sees breast surgeon Dr. Thornton in regular f/u for h/o breast DCIS. Has mammogram/breast US scheduled for September.\par \alexandrea Has had Covid vaccines (Pfizer)-first dose 3/2/21/second dose 3/23/21. Had third shot 11/18/21.

## 2022-08-11 NOTE — REASON FOR VISIT
[Home] : at home, [unfilled] , at the time of the visit. [Medical Office: ([facility])___] : at the medical office located in  [Patient] : the patient [Self] : self [Follow-Up Visit] : a follow-up visit for [FreeTextEntry2] : anemia

## 2022-08-24 ENCOUNTER — NON-APPOINTMENT (OUTPATIENT)
Age: 72
End: 2022-08-24

## 2022-09-10 ENCOUNTER — NON-APPOINTMENT (OUTPATIENT)
Age: 72
End: 2022-09-10

## 2022-09-26 ENCOUNTER — TRANSCRIPTION ENCOUNTER (OUTPATIENT)
Age: 72
End: 2022-09-26

## 2022-09-26 ENCOUNTER — OUTPATIENT (OUTPATIENT)
Dept: OUTPATIENT SERVICES | Facility: HOSPITAL | Age: 72
LOS: 1 days | Discharge: HOME | End: 2022-09-26

## 2022-09-26 DIAGNOSIS — Z98.890 OTHER SPECIFIED POSTPROCEDURAL STATES: Chronic | ICD-10-CM

## 2022-09-26 DIAGNOSIS — Z90.79 ACQUIRED ABSENCE OF OTHER GENITAL ORGAN(S): Chronic | ICD-10-CM

## 2022-09-26 DIAGNOSIS — D50.9 IRON DEFICIENCY ANEMIA, UNSPECIFIED: ICD-10-CM

## 2022-09-26 DIAGNOSIS — Z98.891 HISTORY OF UTERINE SCAR FROM PREVIOUS SURGERY: Chronic | ICD-10-CM

## 2022-09-26 DIAGNOSIS — Z90.721 ACQUIRED ABSENCE OF OVARIES, UNILATERAL: Chronic | ICD-10-CM

## 2022-09-27 LAB
BASOPHILS # BLD AUTO: 0.09 K/UL
BASOPHILS NFR BLD AUTO: 1.2 %
EOSINOPHIL # BLD AUTO: 0.35 K/UL
EOSINOPHIL NFR BLD AUTO: 4.6 %
FERRITIN SERPL-MCNC: 161 NG/ML
HCT VFR BLD CALC: 31.9 %
HGB BLD-MCNC: 10.1 G/DL
IMM GRANULOCYTES NFR BLD AUTO: 0.4 %
IRON SATN MFR SERPL: 27 %
IRON SERPL-MCNC: 80 UG/DL
LYMPHOCYTES # BLD AUTO: 1.48 K/UL
LYMPHOCYTES NFR BLD AUTO: 19.4 %
MAN DIFF?: NORMAL
MCHC RBC-ENTMCNC: 19.6 PG
MCHC RBC-ENTMCNC: 31.7 GM/DL
MCV RBC AUTO: 62.1 FL
MONOCYTES # BLD AUTO: 0.72 K/UL
MONOCYTES NFR BLD AUTO: 9.5 %
NEUTROPHILS # BLD AUTO: 4.94 K/UL
NEUTROPHILS NFR BLD AUTO: 64.9 %
PLATELET # BLD AUTO: 408 K/UL
RBC # BLD: 5.14 M/UL
RBC # BLD: 5.14 M/UL
RBC # FLD: 19.3 %
RETICS # AUTO: 2.2 %
RETICS AGGREG/RBC NFR: 114.4 K/UL
TIBC SERPL-MCNC: 298 UG/DL
UIBC SERPL-MCNC: 218 UG/DL
WBC # FLD AUTO: 7.61 K/UL

## 2022-09-28 ENCOUNTER — APPOINTMENT (OUTPATIENT)
Dept: INTERNAL MEDICINE | Facility: CLINIC | Age: 72
End: 2022-09-28

## 2022-09-28 ENCOUNTER — RESULT REVIEW (OUTPATIENT)
Age: 72
End: 2022-09-28

## 2022-09-28 VITALS
HEART RATE: 75 BPM | WEIGHT: 162 LBS | RESPIRATION RATE: 18 BRPM | HEIGHT: 67 IN | BODY MASS INDEX: 25.43 KG/M2 | OXYGEN SATURATION: 98 % | TEMPERATURE: 98.2 F

## 2022-09-28 VITALS — DIASTOLIC BLOOD PRESSURE: 74 MMHG | SYSTOLIC BLOOD PRESSURE: 134 MMHG

## 2022-09-28 PROCEDURE — G0439: CPT

## 2022-09-28 RX ORDER — DICLOFENAC SODIUM 1% 10 MG/G
1 GEL TOPICAL
Qty: 200 | Refills: 5 | Status: ACTIVE | COMMUNITY
Start: 2022-09-28 | End: 1900-01-01

## 2022-09-28 NOTE — HISTORY OF PRESENT ILLNESS
[FreeTextEntry1] : Here for annual [de-identified] : UPDATED: seeing Hematology for anemia\par had endo and colonoscopy - negative findings\par \par had COVID was OK overall 5 weeks didn’t take the paxlovid 2/2 GI issues

## 2022-09-28 NOTE — HEALTH RISK ASSESSMENT
[Very Good] : ~his/her~  mood as very good [Never] : Never [No] : No [1 or 2 (0 pts)] : 1 or 2 (0 points) [Never (0 pts)] : Never (0 points) [No falls in past year] : Patient reported no falls in the past year [0] : 2) Feeling down, depressed, or hopeless: Not at all (0) [PHQ-2 Negative - No further assessment needed] : PHQ-2 Negative - No further assessment needed [de-identified] : GI, Hematology,  [Audit-CScore] : 0 [de-identified] : walking a lot not in heat [de-identified] : good [[id number]] : 0 [Patient reported mammogram was normal] : Patient reported mammogram was normal [Patient reported PAP Smear was normal] : Patient reported PAP Smear was normal [Patient reported colonoscopy was normal] : Patient reported colonoscopy was normal [HIV test declined] : HIV test declined [Hepatitis C test declined] : Hepatitis C test declined [Change in mental status noted] : No change in mental status noted [Language] : denies difficulty with language [Behavior] : denies difficulty with behavior [Learning/Retaining New Information] : denies difficulty learning/retaining new information [Handling Complex Tasks] : denies difficulty handling complex tasks [Reasoning] : denies difficulty with reasoning [Spatial Ability and Orientation] : denies difficulty with spatial ability and orientation [None] : None [With Family] : lives with family [Retired] : retired [] :  [Feels Safe at Home] : Feels safe at home [Fully functional (bathing, dressing, toileting, transferring, walking, feeding)] : Fully functional (bathing, dressing, toileting, transferring, walking, feeding) [Fully functional (using the telephone, shopping, preparing meals, housekeeping, doing laundry, using] : Fully functional and needs no help or supervision to perform IADLs (using the telephone, shopping, preparing meals, housekeeping, doing laundry, using transportation, managing medications and managing finances) [Reports changes in hearing] : Reports no changes in hearing [Reports changes in vision] : Reports no changes in vision [Reports changes in dental health] : Reports no changes in dental health [MammogramDate] : 9/2021 [PapSmearDate] : 10/2021 [BoneDensityComments] : needs [ColonoscopyDate] : 6/2022

## 2022-09-29 ENCOUNTER — APPOINTMENT (OUTPATIENT)
Dept: HEMATOLOGY ONCOLOGY | Facility: CLINIC | Age: 72
End: 2022-09-29

## 2022-09-29 PROCEDURE — 99213 OFFICE O/P EST LOW 20 MIN: CPT | Mod: 95

## 2022-09-29 NOTE — REASON FOR VISIT
[Follow-Up Visit] : a follow-up visit for [Home] : at home, [unfilled] , at the time of the visit. [Medical Office: ([facility])___] : at the medical office located in  [Patient] : the patient [Self] : self [FreeTextEntry2] : anemia

## 2022-09-29 NOTE — PHYSICAL EXAM
[Normal] : affect appropriate [de-identified] : breathing appeared unlabored [de-identified] : coloration appeared normal

## 2022-09-29 NOTE — ASSESSMENT
[FreeTextEntry1] : Lab work reviewed.\par #1) Anemia/iron deficiency-h/o diverticulosis/diverticulitis with GI bleeding followed by surgery.  In addition, prior episode of hematuria in 5/2021.\par No overt bleeding reported at present. Though thalassemia contributing to baseline anemia, Had recommended GI follow-up for further GI evaluation-had EGD. Patient has deferred small bowel study due to multiple prior bowel surgeries.\par With persistent anemia, and decrease in iron, patient wished for resumption of Feraheme-last dose 7/22/22. Ferritin increased and hemoglobin with improving trend currently.\par \par #2)  History of thalassemia–beta thal. minor, with baseline hemoglobin ~10 per patient.\par \par #3) history of thrombocytosis–clinically suspect represents a reactive process at present.  Platelet count appears to fluctuate, and trending downward more recently. 2021 PB TONO 2 mutation study normal; and CALR, MPL studies normal. Continue to monitor CBC.\par \par Should hematologic scenario change/worsen, can decide regarding further evaluation such as BMB, to r/o underlying evolving BM disorder.\par \par Patient was given the opportunity to ask questions.  Her questions have been answered to her apparent satisfaction at this time.  She expressed her understanding and willingness to comply with recommended follow-up.

## 2022-09-29 NOTE — HISTORY OF PRESENT ILLNESS
[de-identified] : Had 1 year of "digestive issues." 9/2020–Colonoscopy revealed a stricture of benign intrinsic appearance in the sigmoid colon.  Diverticular disease was severe.  Diverticular associated colitis noted.  \par 10/2020–CT scan abdomen/pelvis compared to 9/2020 showed persistent marked inflammation and wall thickening of the sigmoid colon in association with innumerable diverticuli consistent with diverticulitis.  The inflammation extended to the uterus and involved adjacent bowel loops.  \par 11/2020–Patient underwent left colon and rectosigmoid anterior resection with pathology revealing diverticulosis with diverticulitis. Had pelvic abscess. Right adnexa, salpingo-oophorectomy performed with pathology revealing serositis.  Endometrial curettings negative for malignancy.\par 1/2021–Patient underwent closure of a diverting loop ileostomy which had been constructed to protect an at risk colorectal anastomosis done after resection of complicated diverticulitis. 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 \par However, noted an acute drop 6/3/2021 to a hemoglobin of 7.4.  Iron deficiency noted with a ferritin of 7. PRN IV Feraheme.\par  [de-identified] : S/P COVID infection 5 weeks ago-now feeling better. Didn't take paxlovid prescribed.\alexandrea Received feraheme infusions-last 7/22/22.\alexandrea Hasn't been taking aking PO iron (ferrous gluconate) since July.\par Patient has no current complaints of chest pain, shortness of breath. No blood per rectum or melena reported, or vaginal bleeding.  No nausea/vomiting, abdominal or pelvic pain. No fevers.  No lymph node complaints.\alexandrea Takes Metamucil daily per Dr. Marx.\alexandrea Sees breast surgeon Dr. Thornton in regular f/u for h/o breast DCIS.\par \alexandrea Has had Covid vaccines (Pfizer)-first dose 3/2/21/second dose 3/23/21. Had third shot 11/18/21.

## 2022-11-28 ENCOUNTER — APPOINTMENT (OUTPATIENT)
Dept: RADIOLOGY | Facility: HOSPITAL | Age: 72
End: 2022-11-28

## 2022-11-28 ENCOUNTER — RESULT REVIEW (OUTPATIENT)
Age: 72
End: 2022-11-28

## 2022-11-28 ENCOUNTER — OUTPATIENT (OUTPATIENT)
Dept: OUTPATIENT SERVICES | Facility: HOSPITAL | Age: 72
LOS: 1 days | End: 2022-11-28
Payer: MEDICARE

## 2022-11-28 DIAGNOSIS — Z90.79 ACQUIRED ABSENCE OF OTHER GENITAL ORGAN(S): Chronic | ICD-10-CM

## 2022-11-28 DIAGNOSIS — Z98.890 OTHER SPECIFIED POSTPROCEDURAL STATES: Chronic | ICD-10-CM

## 2022-11-28 DIAGNOSIS — Z00.8 ENCOUNTER FOR OTHER GENERAL EXAMINATION: ICD-10-CM

## 2022-11-28 DIAGNOSIS — Z90.721 ACQUIRED ABSENCE OF OVARIES, UNILATERAL: Chronic | ICD-10-CM

## 2022-11-28 DIAGNOSIS — Z98.891 HISTORY OF UTERINE SCAR FROM PREVIOUS SURGERY: Chronic | ICD-10-CM

## 2022-11-28 PROCEDURE — 73562 X-RAY EXAM OF KNEE 3: CPT

## 2022-11-28 PROCEDURE — 73562 X-RAY EXAM OF KNEE 3: CPT | Mod: 26,50

## 2022-11-28 PROCEDURE — 77080 DXA BONE DENSITY AXIAL: CPT

## 2022-11-28 PROCEDURE — 77080 DXA BONE DENSITY AXIAL: CPT | Mod: 26

## 2022-12-05 ENCOUNTER — RX RENEWAL (OUTPATIENT)
Age: 72
End: 2022-12-05

## 2022-12-10 ENCOUNTER — TRANSCRIPTION ENCOUNTER (OUTPATIENT)
Age: 72
End: 2022-12-10

## 2023-01-19 ENCOUNTER — OUTPATIENT (OUTPATIENT)
Dept: OUTPATIENT SERVICES | Facility: HOSPITAL | Age: 73
LOS: 1 days | Discharge: HOME | End: 2023-01-19

## 2023-01-19 DIAGNOSIS — D50.9 IRON DEFICIENCY ANEMIA, UNSPECIFIED: ICD-10-CM

## 2023-01-19 DIAGNOSIS — Z98.890 OTHER SPECIFIED POSTPROCEDURAL STATES: Chronic | ICD-10-CM

## 2023-01-19 DIAGNOSIS — Z98.891 HISTORY OF UTERINE SCAR FROM PREVIOUS SURGERY: Chronic | ICD-10-CM

## 2023-01-19 DIAGNOSIS — Z90.79 ACQUIRED ABSENCE OF OTHER GENITAL ORGAN(S): Chronic | ICD-10-CM

## 2023-01-19 DIAGNOSIS — Z90.721 ACQUIRED ABSENCE OF OVARIES, UNILATERAL: Chronic | ICD-10-CM

## 2023-01-20 ENCOUNTER — NON-APPOINTMENT (OUTPATIENT)
Age: 73
End: 2023-01-20

## 2023-01-23 ENCOUNTER — LABORATORY RESULT (OUTPATIENT)
Age: 73
End: 2023-01-23

## 2023-01-23 PROBLEM — Z92.89 HISTORY OF MAMMOGRAM: Status: RESOLVED | Noted: 2021-06-18 | Resolved: 2023-01-23

## 2023-01-24 ENCOUNTER — APPOINTMENT (OUTPATIENT)
Dept: HEMATOLOGY ONCOLOGY | Facility: CLINIC | Age: 73
End: 2023-01-24
Payer: MEDICARE

## 2023-01-24 DIAGNOSIS — Z92.89 PERSONAL HISTORY OF OTHER MEDICAL TREATMENT: ICD-10-CM

## 2023-01-24 LAB
BASOPHILS # BLD AUTO: 0.06 K/UL
BASOPHILS NFR BLD AUTO: 0.9 %
EOSINOPHIL # BLD AUTO: 0.19 K/UL
EOSINOPHIL NFR BLD AUTO: 2.6 %
FERRITIN SERPL-MCNC: 48 NG/ML
HCT VFR BLD CALC: 35.9 %
HGB BLD-MCNC: 10.9 G/DL
IRON SATN MFR SERPL: 18 %
IRON SERPL-MCNC: 63 UG/DL
LYMPHOCYTES # BLD AUTO: 1.76 K/UL
LYMPHOCYTES NFR BLD AUTO: 24.6 %
MAN DIFF?: NORMAL
MCHC RBC-ENTMCNC: 19 PG
MCHC RBC-ENTMCNC: 30.4 GM/DL
MCV RBC AUTO: 62.5 FL
MONOCYTES # BLD AUTO: 0.5 K/UL
MONOCYTES NFR BLD AUTO: 7 %
NEUTROPHILS # BLD AUTO: 4.63 K/UL
NEUTROPHILS NFR BLD AUTO: 64.9 %
PLATELET # BLD AUTO: 425 K/UL
RBC # BLD: 5.74 M/UL
RBC # BLD: 5.74 M/UL
RBC # FLD: 17.6 %
RETICS # AUTO: 1.6 %
RETICS AGGREG/RBC NFR: 90.8 K/UL
TIBC SERPL-MCNC: 348 UG/DL
UIBC SERPL-MCNC: 284 UG/DL
WBC # FLD AUTO: 7.14 K/UL

## 2023-01-24 PROCEDURE — 99213 OFFICE O/P EST LOW 20 MIN: CPT | Mod: 95

## 2023-01-24 NOTE — REASON FOR VISIT
[Follow-Up Visit] : a follow-up visit for [Home] : at home, [unfilled] , at the time of the visit. [Medical Office: ([facility])___] : at the medical office located in  [Patient] : the patient [Self] : self [FreeTextEntry2] : anemia

## 2023-01-24 NOTE — HISTORY OF PRESENT ILLNESS
[de-identified] : Had 1 year of "digestive issues." 9/2020–Colonoscopy revealed a stricture of benign intrinsic appearance in the sigmoid colon.  Diverticular disease was severe.  Diverticular associated colitis noted.  \par 10/2020–CT scan abdomen/pelvis compared to 9/2020 showed persistent marked inflammation and wall thickening of the sigmoid colon in association with innumerable diverticuli consistent with diverticulitis.  The inflammation extended to the uterus and involved adjacent bowel loops.  \par 11/2020–Patient underwent left colon and rectosigmoid anterior resection with pathology revealing diverticulosis with diverticulitis. Had pelvic abscess. Right adnexa, salpingo-oophorectomy performed with pathology revealing serositis.  Endometrial curettings negative for malignancy.\par 1/2021–Patient underwent closure of a diverting loop ileostomy which had been constructed to protect an at risk colorectal anastomosis done after resection of complicated diverticulitis. 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 \par However, noted an acute drop 6/3/2021 to a hemoglobin of 7.4.  Iron deficiency noted with a ferritin of 7. PRN IV Feraheme.\par  [de-identified] : Feeling very well in general.\par Starting PT this week for knees.\par Patient has no current complaints of chest pain, shortness of breath. No blood per rectum or melena reported, or vaginal bleeding.  No nausea/vomiting, abdominal or pelvic pain. No fevers.  No lymph node complaints.\par Takes Metamucil daily per Dr. Marx.\par Sees breast surgeon Dr. Thornton in regular f/u for h/o breast DCIS.\par \par Has had Covid vaccines (Pfizer)-first dose 3/2/21/second dose 3/23/21. Had third shot 11/18/21. Had bivalent booster 12/8/22.\par Had flu shot.

## 2023-01-24 NOTE — ASSESSMENT
[FreeTextEntry1] : Lab work reviewed.\par #1) Anemia/iron deficiency-h/o diverticulosis/diverticulitis with GI bleeding followed by surgery.  In addition, prior episode of hematuria in 5/2021.\par No overt bleeding reported at present. Though thalassemia contributing to baseline anemia, Had recommended GI follow-up for further GI evaluation-had EGD. Patient has deferred small bowel study due to multiple prior bowel surgeries.\par With persistent anemia, and decrease in iron, patient wished for resumption of Feraheme-last dose 7/22/22. Ferritin increased and hemoglobin with improving trend on most recent lab work available..\par \par #2)  History of thalassemia–beta thal. minor, with baseline hemoglobin ~10 per patient.\par \par #3) history of thrombocytosis–platelet count appears to fluctuate-clinically suspect represents a reactive process at present.  Platelet count appears to fluctuate. 2021 PB TONO 2 mutation study normal; and CALR, MPL studies normal. Continue to monitor CBC and follow clinically.\par Taking aspirin 81 mg PO QOD per PCP.\par \par Should hematologic scenario change/worsen, can decide regarding further evaluation such as BMB, to r/o underlying evolving BM disorder.\par \par Patient was given the opportunity to ask questions.  Her questions have been answered to her apparent satisfaction at this time.  She expressed her understanding and willingness to comply with recommended follow-up.

## 2023-01-24 NOTE — PHYSICAL EXAM
[Normal] : affect appropriate [de-identified] : breathing appeared unlabored [de-identified] : coloration appeared normal

## 2023-02-16 NOTE — PROGRESS NOTE ADULT - PROVIDER SPECIALTY LIST ADULT
Surgery Methotrexate Counseling:  Patient counseled regarding adverse effects of methotrexate including but not limited to nausea, vomiting, abnormalities in liver function tests. Patients may develop mouth sores, rash, diarrhea, and abnormalities in blood counts. The patient understands that monitoring is required including LFT's and blood counts.  There is a rare possibility of scarring of the liver and lung problems that can occur when taking methotrexate. Persistent nausea, loss of appetite, pale stools, dark urine, cough, and shortness of breath should be reported immediately. Patient advised to discontinue methotrexate treatment at least three months before attempting to become pregnant.  I discussed the need for folate supplements while taking methotrexate.  These supplements can decrease side effects during methotrexate treatment. The patient verbalized understanding of the proper use and possible adverse effects of methotrexate.  All of the patient's questions and concerns were addressed.

## 2023-05-07 ENCOUNTER — OUTPATIENT (OUTPATIENT)
Dept: OUTPATIENT SERVICES | Facility: HOSPITAL | Age: 73
LOS: 1 days | Discharge: HOME | End: 2023-05-07

## 2023-05-07 DIAGNOSIS — Z90.721 ACQUIRED ABSENCE OF OVARIES, UNILATERAL: Chronic | ICD-10-CM

## 2023-05-07 DIAGNOSIS — Z98.891 HISTORY OF UTERINE SCAR FROM PREVIOUS SURGERY: Chronic | ICD-10-CM

## 2023-05-07 DIAGNOSIS — Z98.890 OTHER SPECIFIED POSTPROCEDURAL STATES: Chronic | ICD-10-CM

## 2023-05-07 DIAGNOSIS — Z90.79 ACQUIRED ABSENCE OF OTHER GENITAL ORGAN(S): Chronic | ICD-10-CM

## 2023-05-07 DIAGNOSIS — D50.9 IRON DEFICIENCY ANEMIA, UNSPECIFIED: ICD-10-CM

## 2023-05-15 ENCOUNTER — LABORATORY RESULT (OUTPATIENT)
Age: 73
End: 2023-05-15

## 2023-05-16 LAB
BASOPHILS # BLD AUTO: 0.08 K/UL
BASOPHILS NFR BLD AUTO: 1 %
EOSINOPHIL # BLD AUTO: 0.26 K/UL
EOSINOPHIL NFR BLD AUTO: 3.3 %
FERRITIN SERPL-MCNC: 28 NG/ML
HCT VFR BLD CALC: 33.4 %
HGB BLD-MCNC: 10.2 G/DL
IMM GRANULOCYTES NFR BLD AUTO: 0.3 %
IRON SATN MFR SERPL: 16 %
IRON SERPL-MCNC: 59 UG/DL
LYMPHOCYTES # BLD AUTO: 1.42 K/UL
LYMPHOCYTES NFR BLD AUTO: 18.2 %
MAN DIFF?: NORMAL
MCHC RBC-ENTMCNC: 18.9 PG
MCHC RBC-ENTMCNC: 30.5 GM/DL
MCV RBC AUTO: 61.9 FL
MONOCYTES # BLD AUTO: 0.61 K/UL
MONOCYTES NFR BLD AUTO: 7.8 %
NEUTROPHILS # BLD AUTO: 5.41 K/UL
NEUTROPHILS NFR BLD AUTO: 69.4 %
PLATELET # BLD AUTO: 457 K/UL
RBC # BLD: 5.4 M/UL
RBC # BLD: 5.4 M/UL
RBC # FLD: 16.5 %
RETICS # AUTO: 1.4 %
RETICS AGGREG/RBC NFR: 73.6 K/UL
TIBC SERPL-MCNC: 367 UG/DL
UIBC SERPL-MCNC: 308 UG/DL
WBC # FLD AUTO: 7.8 K/UL

## 2023-05-17 ENCOUNTER — APPOINTMENT (OUTPATIENT)
Dept: HEMATOLOGY ONCOLOGY | Facility: CLINIC | Age: 73
End: 2023-05-17
Payer: MEDICARE

## 2023-05-17 PROCEDURE — 99213 OFFICE O/P EST LOW 20 MIN: CPT | Mod: 95

## 2023-05-17 NOTE — HISTORY OF PRESENT ILLNESS
[de-identified] : Had 1 year of "digestive issues." 9/2020–Colonoscopy revealed a stricture of benign intrinsic appearance in the sigmoid colon.  Diverticular disease was severe.  Diverticular associated colitis noted.  \par 10/2020–CT scan abdomen/pelvis compared to 9/2020 showed persistent marked inflammation and wall thickening of the sigmoid colon in association with innumerable diverticuli consistent with diverticulitis.  The inflammation extended to the uterus and involved adjacent bowel loops.  \par 11/2020–Patient underwent left colon and rectosigmoid anterior resection with pathology revealing diverticulosis with diverticulitis. Had pelvic abscess. Right adnexa, salpingo-oophorectomy performed with pathology revealing serositis.  Endometrial curettings negative for malignancy.\par 1/2021–Patient underwent closure of a diverting loop ileostomy which had been constructed to protect an at risk colorectal anastomosis done after resection of complicated diverticulitis. 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 \par However, noted an acute drop 6/3/2021 to a hemoglobin of 7.4.  Iron deficiency noted with a ferritin of 7. PRN IV Feraheme.\par  [de-identified] : S/P laser surgery for retinal tear OD.\par Patient has no current complaints of chest pain, shortness of breath. No blood per rectum or melena reported, or vaginal bleeding.  No nausea/vomiting, abdominal or pelvic pain. No fevers.  No lymph node complaints.\alexandrea Takes Metamucil daily per Dr. Marx.\alexandrea Sees breast surgeon in regular f/u for h/o breast DCIS.\alexandrea Going to [place] on vacation (50th wedding anniversary).\alexandrea

## 2023-05-17 NOTE — PHYSICAL EXAM
[Normal] : affect appropriate [de-identified] : breathing appeared unlabored [de-identified] : coloration appeared normal

## 2023-05-17 NOTE — ASSESSMENT
[FreeTextEntry1] : Lab work reviewed.\par #1) Anemia/iron deficiency-h/o diverticulosis/diverticulitis with GI bleeding followed by surgery.  In addition, prior episode of hematuria in 5/2021.\par No overt bleeding reported at present. Though thalassemia contributing to baseline anemia, Had recommended GI follow-up for further GI evaluation-had EGD and colonoscopy 6/2022. Patient has deferred small bowel study due to multiple prior bowel surgeries.\par With persistent anemia, and decrease in iron, patient wished for resumption of Feraheme-last dose 7/22/22. Ferritin currently WNL, though trending downward. Interval f/u lab work to be done.\par \par #2)  History of thalassemia–beta thal. minor, with baseline hemoglobin ~10 per patient.\par \par #3) history of chronic thrombocytosis–platelet count appears to fluctuate-clinically suspect represents a reactive process at present.  2021 PB TONO 2 mutation study normal; and CALR, MPL studies normal. Continue to monitor CBC and follow clinically.\par Taking aspirin 81 mg PO QOD per PCP.\par \par Should hematologic scenario change/worsen, to consider further evaluation such as BMB, to r/o underlying evolving BM/Myeloproliferative disorder.\par \par Patient was given the opportunity to ask questions.  Her questions have been answered to her apparent satisfaction at this time.  She expressed her understanding and willingness to comply with recommended follow-up.

## 2023-05-17 NOTE — REASON FOR VISIT
[Follow-Up Visit] : a follow-up visit for [Home] : at home, [unfilled] , at the time of the visit. [Medical Office: ([facility])___] : at the medical office located in  [Patient] : the patient [Self] : self [FreeTextEntry2] : anemia

## 2023-08-10 NOTE — H&P PST ADULT - NSANTHAGERD_ENT_A_CORE
Yes Hydroxychloroquine Counseling:  I discussed with the patient that a baseline ophthalmologic exam is needed at the start of therapy and every year thereafter while on therapy. A CBC may also be warranted for monitoring.  The side effects of this medication were discussed with the patient, including but not limited to agranulocytosis, aplastic anemia, seizures, rashes, retinopathy, and liver toxicity. Patient instructed to call the office should any adverse effect occur.  The patient verbalized understanding of the proper use and possible adverse effects of Plaquenil.  All the patient's questions and concerns were addressed.

## 2023-08-14 ENCOUNTER — OUTPATIENT (OUTPATIENT)
Dept: OUTPATIENT SERVICES | Facility: HOSPITAL | Age: 73
LOS: 1 days | Discharge: HOME | End: 2023-08-14

## 2023-08-14 DIAGNOSIS — Z90.79 ACQUIRED ABSENCE OF OTHER GENITAL ORGAN(S): Chronic | ICD-10-CM

## 2023-08-14 DIAGNOSIS — Z98.890 OTHER SPECIFIED POSTPROCEDURAL STATES: Chronic | ICD-10-CM

## 2023-08-14 DIAGNOSIS — Z90.721 ACQUIRED ABSENCE OF OVARIES, UNILATERAL: Chronic | ICD-10-CM

## 2023-08-14 DIAGNOSIS — Z98.891 HISTORY OF UTERINE SCAR FROM PREVIOUS SURGERY: Chronic | ICD-10-CM

## 2023-08-14 DIAGNOSIS — D50.9 IRON DEFICIENCY ANEMIA, UNSPECIFIED: ICD-10-CM

## 2023-09-01 ENCOUNTER — LABORATORY RESULT (OUTPATIENT)
Age: 73
End: 2023-09-01

## 2023-09-01 LAB
FERRITIN SERPL-MCNC: 19 NG/ML
IRON SATN MFR SERPL: 20 %
IRON SERPL-MCNC: 77 UG/DL
RBC # BLD: 5.38 M/UL
RETICS # AUTO: 1.2 %
RETICS AGGREG/RBC NFR: 64.2 K/UL
TIBC SERPL-MCNC: 381 UG/DL
UIBC SERPL-MCNC: 305 UG/DL

## 2023-09-07 ENCOUNTER — APPOINTMENT (OUTPATIENT)
Dept: HEMATOLOGY ONCOLOGY | Facility: CLINIC | Age: 73
End: 2023-09-07
Payer: MEDICARE

## 2023-09-07 PROCEDURE — 99213 OFFICE O/P EST LOW 20 MIN: CPT | Mod: 95

## 2023-09-07 NOTE — PHYSICAL EXAM
[Normal] : affect appropriate [de-identified] : breathing appeared unlabored [de-identified] : coloration appeared normal

## 2023-09-07 NOTE — REASON FOR VISIT
[Follow-Up Visit] : a follow-up visit for [Home] : at home, [unfilled] , at the time of the visit. [Medical Office: ([facility])___] : at the medical office located in  [Patient] : the patient [Self] : self [FreeTextEntry2] : anemia

## 2023-09-07 NOTE — ASSESSMENT
[FreeTextEntry1] : Lab work reviewed. #1) Anemia/iron deficiency-h/o diverticulosis/diverticulitis with GI bleeding followed by surgery.  In addition, prior episode of hematuria in 5/2021. No overt bleeding reported at present. Though thalassemia contributing to baseline anemia, Had recommended GI follow-up for further GI evaluation-had EGD and colonoscopy 6/2022. Patient has deferred small bowel study due to h/o multiple prior bowel surgeries. With persistent anemia, and decrease in iron, patient wished for resumption of Feraheme-last dose 7/22/22. Ferritin currently WNL, though trending downward. Will resume PO iron supplement for now. Interval f/u lab work to be done.  #2)  History of thalassemia-beta thal. minor, with baseline hemoglobin ~10 per patient.  #3) history of chronic thrombocytosis-platelet count appears to fluctuate-clinically suspect represents a reactive process at present.  2021 PB TONO 2 mutation study normal; and CALR, MPL studies normal. Continue to monitor CBC and follow clinically. Taking aspirin 81 mg PO QOD per PCP.  Should hematologic scenario change/worsen, to consider further evaluation such as BMB, to r/o underlying evolving BM/Myeloproliferative disorder.  #4) h/o breast DCIS-is scheduled for next breast imaging-getting scripts from GYN MD-seeing in October. Also plans to see new breast surgeon.  Patient was given the opportunity to ask questions.  Her questions have been answered to her apparent satisfaction at this time.  She expressed her understanding and willingness to comply with recommended follow-up.  -- >Ferrous gluconate-one tab PO QOD. RTC 4 months.

## 2023-09-07 NOTE — HISTORY OF PRESENT ILLNESS
[de-identified] : Had 1 year of "digestive issues." 9/2020-Colonoscopy revealed a stricture of benign intrinsic appearance in the sigmoid colon.  Diverticular disease was severe.  Diverticular associated colitis noted.   10/2020-CT scan abdomen/pelvis compared to 9/2020 showed persistent marked inflammation and wall thickening of the sigmoid colon in association with innumerable diverticuli consistent with diverticulitis.  The inflammation extended to the uterus and involved adjacent bowel loops.   11/2020-Patient underwent left colon and rectosigmoid anterior resection with pathology revealing diverticulosis with diverticulitis. Had pelvic abscess. Right adnexa, salpingo-oophorectomy performed with pathology revealing serositis.  Endometrial curettings negative for malignancy. 1/2021-Patient underwent closure of a diverting loop ileostomy which had been constructed to protect an at risk colorectal anastomosis done after resection of complicated diverticulitis. Post-op course complicated by blood loss , requiring 2 units PRBC transfusion.  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  However, noted an acute drop 6/3/2021 to a hemoglobin of 7.4.  Iron deficiency noted with a ferritin of 7. PRN IV Feraheme.  [de-identified] : Had shingles 6/2023 around eye-recovered from this. Feeling very well currently. Patient has no current complaints of chest pain, shortness of breath. No blood per rectum or melena reported, or vaginal bleeding.  No nausea/vomiting, abdominal or pelvic pain. No fevers.  No lymph node complaints. Takes Metamucil daily per Dr. Marx.

## 2023-10-06 ENCOUNTER — APPOINTMENT (OUTPATIENT)
Dept: INTERNAL MEDICINE | Facility: CLINIC | Age: 73
End: 2023-10-06
Payer: MEDICARE

## 2023-10-06 ENCOUNTER — NON-APPOINTMENT (OUTPATIENT)
Age: 73
End: 2023-10-06

## 2023-10-06 VITALS
HEART RATE: 67 BPM | TEMPERATURE: 97.3 F | OXYGEN SATURATION: 99 % | HEIGHT: 67 IN | WEIGHT: 164 LBS | RESPIRATION RATE: 16 BRPM | BODY MASS INDEX: 25.74 KG/M2

## 2023-10-06 VITALS — SYSTOLIC BLOOD PRESSURE: 134 MMHG | DIASTOLIC BLOOD PRESSURE: 76 MMHG

## 2023-10-06 DIAGNOSIS — M25.562 PAIN IN RIGHT KNEE: ICD-10-CM

## 2023-10-06 DIAGNOSIS — M25.561 PAIN IN RIGHT KNEE: ICD-10-CM

## 2023-10-06 DIAGNOSIS — M17.10 UNILATERAL PRIMARY OSTEOARTHRITIS, UNSPECIFIED KNEE: ICD-10-CM

## 2023-10-06 DIAGNOSIS — Z00.00 ENCOUNTER FOR GENERAL ADULT MEDICAL EXAMINATION W/OUT ABNORMAL FINDINGS: ICD-10-CM

## 2023-10-06 PROCEDURE — G0439: CPT

## 2023-10-06 PROCEDURE — 93000 ELECTROCARDIOGRAM COMPLETE: CPT

## 2023-11-15 ENCOUNTER — NON-APPOINTMENT (OUTPATIENT)
Age: 73
End: 2023-11-15

## 2023-12-29 ENCOUNTER — OUTPATIENT (OUTPATIENT)
Dept: OUTPATIENT SERVICES | Facility: HOSPITAL | Age: 73
LOS: 1 days | Discharge: HOME | End: 2023-12-29

## 2023-12-29 DIAGNOSIS — Z90.721 ACQUIRED ABSENCE OF OVARIES, UNILATERAL: Chronic | ICD-10-CM

## 2023-12-29 DIAGNOSIS — Z98.890 OTHER SPECIFIED POSTPROCEDURAL STATES: Chronic | ICD-10-CM

## 2023-12-29 DIAGNOSIS — Z98.891 HISTORY OF UTERINE SCAR FROM PREVIOUS SURGERY: Chronic | ICD-10-CM

## 2023-12-29 DIAGNOSIS — Z90.79 ACQUIRED ABSENCE OF OTHER GENITAL ORGAN(S): Chronic | ICD-10-CM

## 2023-12-29 DIAGNOSIS — D50.9 IRON DEFICIENCY ANEMIA, UNSPECIFIED: ICD-10-CM

## 2024-01-03 ENCOUNTER — LABORATORY RESULT (OUTPATIENT)
Age: 74
End: 2024-01-03

## 2024-01-10 ENCOUNTER — APPOINTMENT (OUTPATIENT)
Dept: HEMATOLOGY ONCOLOGY | Facility: CLINIC | Age: 74
End: 2024-01-10
Payer: MEDICARE

## 2024-01-10 PROCEDURE — 99213 OFFICE O/P EST LOW 20 MIN: CPT

## 2024-01-10 NOTE — CONSULT LETTER
[Dear  ___] : Dear  [unfilled], [Please see my note below.] : Please see my note below. [Courtesy Letter:] : I had the pleasure of seeing your patient, [unfilled], in my office today. [Consult Closing:] : Thank you very much for allowing me to participate in the care of this patient.  If you have any questions, please do not hesitate to contact me. [Sincerely,] : Sincerely, [FreeTextEntry3] : Court Hunt MD

## 2024-01-10 NOTE — ASSESSMENT
[FreeTextEntry1] : Lab work reviewed. #1) Anemia/iron deficiency-h/o diverticulosis/diverticulitis with GI bleeding followed by surgery. In addition, prior episode of hematuria in 5/2021. No overt bleeding reported at present. Though thalassemia contributing to baseline anemia, had recommended GI follow-up for further GI evaluation-had EGD and colonoscopy 6/2022. Patient has deferred small bowel study due to h/o multiple prior bowel surgeries. With persistent anemia, and decrease in iron, patient wished for resumption of Feraheme-last dose 7/22/2022.  --Ferritin remains WNL. Hemoglobin is currently stable. --PO iron supplement for now. Interval f/u lab work to be done.  #2) History of thalassemia-beta thal. minor, with baseline hemoglobin ~10 per patient.  #3) history of chronic thrombocytosis-clinically have suspected represents a reactive process, though >500,000 on most recent lab work available. 2021 PB TONO 2 mutation study normal; and CALR, MPL studies normal.  --DDx. reviewed with patient. Should hematologic scenario change/worsen, to consider further evaluation such as BMB, to r/o underlying evolving BM/Myeloproliferative disorder. Continue to monitor CBC and watch for consistent progressive rise in platelet count,  Taking aspirin 81 mg PO QD.  Patient was given the opportunity to ask questions. Her questions have been answered to her apparent satisfaction at this time. She expressed her understanding and willingness to comply with recommended follow-up.  -- >Ferrous gluconate-one tab PO QOD. RTC 4 months.

## 2024-01-10 NOTE — REASON FOR VISIT
[Follow-Up Visit] : a follow-up visit for [Home] : at home, [unfilled] , at the time of the visit. [Medical Office: ([facility])___] : at the medical office located in  [Patient] : the patient [Self] : self [FreeTextEntry2] : anemia

## 2024-01-10 NOTE — HISTORY OF PRESENT ILLNESS
[de-identified] : Had 1 year of "digestive issues." 9/2020-Colonoscopy revealed a stricture of benign intrinsic appearance in the sigmoid colon.  Diverticular disease was severe.  Diverticular associated colitis noted.   10/2020-CT scan abdomen/pelvis compared to 9/2020 showed persistent marked inflammation and wall thickening of the sigmoid colon in association with innumerable diverticuli consistent with diverticulitis.  The inflammation extended to the uterus and involved adjacent bowel loops.   11/2020-Patient underwent left colon and rectosigmoid anterior resection with pathology revealing diverticulosis with diverticulitis. Had pelvic abscess. Right adnexa, salpingo-oophorectomy performed with pathology revealing serositis.  Endometrial curettings negative for malignancy. 1/2021-Patient underwent closure of a diverting loop ileostomy which had been constructed to protect an at risk colorectal anastomosis done after resection of complicated diverticulitis. Post-op course complicated by blood loss , requiring 2 units PRBC transfusion.  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  However, noted an acute drop 6/3/2021 to a hemoglobin of 7.4.  Iron deficiency noted with a ferritin of 7. EGD/colonoscopy done 6/2022. PRN IV Feraheme.  [de-identified] : Taking ferrous iron QOD-began 10/2023. Feeling generally well currently. Patient has no current complaints of chest pain, shortness of breath. No blood per rectum or melena reported, or vaginal bleeding.  No nausea/vomiting, abdominal or pelvic pain. No fevers.  No lymph node complaints. Takes Metamucil daily per Dr. Marx.

## 2024-01-10 NOTE — PHYSICAL EXAM
[Normal] : grossly intact [de-identified] : breathing appeared unlabored [de-identified] : coloration appeared normal

## 2024-02-09 ENCOUNTER — LABORATORY RESULT (OUTPATIENT)
Age: 74
End: 2024-02-09

## 2024-02-21 ENCOUNTER — NON-APPOINTMENT (OUTPATIENT)
Age: 74
End: 2024-02-21

## 2024-02-21 ENCOUNTER — APPOINTMENT (OUTPATIENT)
Dept: CARDIOLOGY | Facility: CLINIC | Age: 74
End: 2024-02-21
Payer: MEDICARE

## 2024-02-21 VITALS
WEIGHT: 164 LBS | HEIGHT: 67 IN | TEMPERATURE: 95.2 F | HEART RATE: 97 BPM | RESPIRATION RATE: 19 BRPM | DIASTOLIC BLOOD PRESSURE: 90 MMHG | BODY MASS INDEX: 25.74 KG/M2 | SYSTOLIC BLOOD PRESSURE: 180 MMHG | OXYGEN SATURATION: 100 %

## 2024-02-21 DIAGNOSIS — E78.5 HYPERLIPIDEMIA, UNSPECIFIED: ICD-10-CM

## 2024-02-21 DIAGNOSIS — I10 ESSENTIAL (PRIMARY) HYPERTENSION: ICD-10-CM

## 2024-02-21 DIAGNOSIS — E78.00 PURE HYPERCHOLESTEROLEMIA, UNSPECIFIED: ICD-10-CM

## 2024-02-21 DIAGNOSIS — H93.A3 PULSATILE TINNITUS, BILATERAL: ICD-10-CM

## 2024-02-21 PROCEDURE — 99215 OFFICE O/P EST HI 40 MIN: CPT

## 2024-02-21 PROCEDURE — 93000 ELECTROCARDIOGRAM COMPLETE: CPT

## 2024-02-21 RX ORDER — CARVEDILOL 12.5 MG/1
12.5 TABLET, FILM COATED ORAL
Qty: 180 | Refills: 1 | Status: ACTIVE | COMMUNITY
Start: 2020-11-04 | End: 1900-01-01

## 2024-02-21 RX ORDER — ROSUVASTATIN CALCIUM 10 MG/1
10 TABLET, FILM COATED ORAL
Qty: 90 | Refills: 1 | Status: ACTIVE | COMMUNITY
Start: 2024-02-21 | End: 1900-01-01

## 2024-03-13 NOTE — ANESTHESIA FOLLOW-UP NOTE - NSEVALATION_GEN_ALL_CORE
March 13, 2024      Kelly Phelps  [address] [address] [address]        To Whom It May Concern:    Kelly Phelps  was seen on 3/13/24.  Please excuse her  until 3/14/24 due to illness.        Sincerely,        ERIK Justice CNP     No apparent complications or complaints regarding anesthesia care at this time/All questions were answered

## 2024-03-18 ENCOUNTER — APPOINTMENT (OUTPATIENT)
Dept: CARDIOLOGY | Facility: CLINIC | Age: 74
End: 2024-03-18
Payer: MEDICARE

## 2024-03-18 PROCEDURE — 93306 TTE W/DOPPLER COMPLETE: CPT

## 2024-03-19 ENCOUNTER — TRANSCRIPTION ENCOUNTER (OUTPATIENT)
Age: 74
End: 2024-03-19

## 2024-03-19 LAB
25(OH)D3 SERPL-MCNC: 47.1 NG/ML
ALBUMIN SERPL ELPH-MCNC: 4.8 G/DL
ALP BLD-CCNC: 86 U/L
ALT SERPL-CCNC: 15 U/L
ANION GAP SERPL CALC-SCNC: 10 MMOL/L
AST SERPL-CCNC: 15 U/L
BASOPHILS # BLD AUTO: 0.07 K/UL
BASOPHILS NFR BLD AUTO: 1 %
BILIRUB SERPL-MCNC: 0.6 MG/DL
BUN SERPL-MCNC: 12 MG/DL
CALCIUM SERPL-MCNC: 10.2 MG/DL
CHLORIDE SERPL-SCNC: 93 MMOL/L
CHOLEST SERPL-MCNC: 244 MG/DL
CO2 SERPL-SCNC: 28 MMOL/L
CREAT SERPL-MCNC: 0.57 MG/DL
EGFR: 96 ML/MIN/1.73M2
EOSINOPHIL # BLD AUTO: 0.23 K/UL
EOSINOPHIL NFR BLD AUTO: 3.2 %
ESTIMATED AVERAGE GLUCOSE: 117 MG/DL
FERRITIN SERPL-MCNC: 32 NG/ML
GLUCOSE SERPL-MCNC: 103 MG/DL
HBA1C MFR BLD HPLC: 5.7 %
HCT VFR BLD CALC: 37 %
HDLC SERPL-MCNC: 73 MG/DL
HGB BLD-MCNC: 11.4 G/DL
IMM GRANULOCYTES NFR BLD AUTO: 0.4 %
IRON SATN MFR SERPL: 22 %
IRON SERPL-MCNC: 81 UG/DL
LDLC SERPL CALC-MCNC: 153 MG/DL
LYMPHOCYTES # BLD AUTO: 1.57 K/UL
LYMPHOCYTES NFR BLD AUTO: 21.6 %
MAN DIFF?: NORMAL
MCHC RBC-ENTMCNC: 18.2 PG
MCHC RBC-ENTMCNC: 30.8 GM/DL
MCV RBC AUTO: 59.1 FL
MONOCYTES # BLD AUTO: 0.42 K/UL
MONOCYTES NFR BLD AUTO: 5.8 %
NEUTROPHILS # BLD AUTO: 4.94 K/UL
NEUTROPHILS NFR BLD AUTO: 68 %
NONHDLC SERPL-MCNC: 171 MG/DL
PLATELET # BLD AUTO: 520 K/UL
POTASSIUM SERPL-SCNC: 4.7 MMOL/L
PROT SERPL-MCNC: 7.9 G/DL
RBC # BLD: 6.26 M/UL
RBC # FLD: 18.4 %
SODIUM SERPL-SCNC: 131 MMOL/L
TIBC SERPL-MCNC: 366 UG/DL
TRIGL SERPL-MCNC: 103 MG/DL
TSH SERPL-ACNC: 2.69 UIU/ML
UIBC SERPL-MCNC: 284 UG/DL
WBC # FLD AUTO: 7.26 K/UL

## 2024-03-26 ENCOUNTER — APPOINTMENT (OUTPATIENT)
Dept: CARDIOLOGY | Facility: CLINIC | Age: 74
End: 2024-03-26
Payer: MEDICARE

## 2024-03-26 PROCEDURE — 93880 EXTRACRANIAL BILAT STUDY: CPT

## 2024-04-26 ENCOUNTER — RX RENEWAL (OUTPATIENT)
Age: 74
End: 2024-04-26

## 2024-04-26 RX ORDER — OLMESARTAN MEDOXOMIL 40 MG/1
40 TABLET, FILM COATED ORAL DAILY
Qty: 90 | Refills: 2 | Status: ACTIVE | COMMUNITY
Start: 2020-04-23 | End: 1900-01-01

## 2024-05-08 ENCOUNTER — OUTPATIENT (OUTPATIENT)
Dept: OUTPATIENT SERVICES | Facility: HOSPITAL | Age: 74
LOS: 1 days | Discharge: HOME | End: 2024-05-08

## 2024-05-08 DIAGNOSIS — Z98.891 HISTORY OF UTERINE SCAR FROM PREVIOUS SURGERY: Chronic | ICD-10-CM

## 2024-05-08 DIAGNOSIS — Z98.890 OTHER SPECIFIED POSTPROCEDURAL STATES: Chronic | ICD-10-CM

## 2024-05-08 DIAGNOSIS — Z90.721 ACQUIRED ABSENCE OF OVARIES, UNILATERAL: Chronic | ICD-10-CM

## 2024-05-08 DIAGNOSIS — Z90.79 ACQUIRED ABSENCE OF OTHER GENITAL ORGAN(S): Chronic | ICD-10-CM

## 2024-05-08 DIAGNOSIS — D50.9 IRON DEFICIENCY ANEMIA, UNSPECIFIED: ICD-10-CM

## 2024-05-14 ENCOUNTER — APPOINTMENT (OUTPATIENT)
Dept: CARDIOLOGY | Facility: CLINIC | Age: 74
End: 2024-05-14

## 2024-05-15 ENCOUNTER — APPOINTMENT (OUTPATIENT)
Dept: HEMATOLOGY ONCOLOGY | Facility: CLINIC | Age: 74
End: 2024-05-15

## 2024-06-02 PROBLEM — D75.839 THROMBOCYTOSIS: Status: ACTIVE | Noted: 2021-06-18

## 2024-06-02 PROBLEM — D64.9 ANEMIA: Status: ACTIVE | Noted: 2021-06-08

## 2024-06-02 PROBLEM — E61.1 IRON DEFICIENCY: Status: ACTIVE | Noted: 2021-06-18

## 2024-06-04 ENCOUNTER — LABORATORY RESULT (OUTPATIENT)
Age: 74
End: 2024-06-04
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
## 2024-06-05 LAB
BASOPHILS # BLD AUTO: 0.07 K/UL
BASOPHILS NFR BLD AUTO: 0.7 %
EOSINOPHIL # BLD AUTO: 0.29 K/UL
EOSINOPHIL NFR BLD AUTO: 2.9 %
FERRITIN SERPL-MCNC: 24 NG/ML
HCT VFR BLD CALC: 33.1 %
HGB BLD-MCNC: 10.1 G/DL
IMM GRANULOCYTES NFR BLD AUTO: 0.2 %
IRON SATN MFR SERPL: 14 %
IRON SERPL-MCNC: 52 UG/DL
LYMPHOCYTES # BLD AUTO: 1.52 K/UL
LYMPHOCYTES NFR BLD AUTO: 15.2 %
MAN DIFF?: NORMAL
MCHC RBC-ENTMCNC: 18.8 PG
MCHC RBC-ENTMCNC: 30.5 GM/DL
MCV RBC AUTO: 61.5 FL
MONOCYTES # BLD AUTO: 0.66 K/UL
MONOCYTES NFR BLD AUTO: 6.6 %
NEUTROPHILS # BLD AUTO: 7.46 K/UL
NEUTROPHILS NFR BLD AUTO: 74.4 %
PLATELET # BLD AUTO: 413 K/UL
RBC # BLD: 5.38 M/UL
RBC # FLD: 17.2 %
TIBC SERPL-MCNC: 369 UG/DL
UIBC SERPL-MCNC: 317 UG/DL
WBC # FLD AUTO: 10.02 K/UL

## 2024-06-05 RX ORDER — DOXYCYCLINE HYCLATE 100 MG/1
100 TABLET ORAL
Qty: 28 | Refills: 0 | Status: ACTIVE | COMMUNITY
Start: 2024-06-05 | End: 1900-01-01

## 2024-06-05 RX ORDER — CLINDAMYCIN HYDROCHLORIDE 300 MG/1
300 CAPSULE ORAL
Qty: 12 | Refills: 0 | Status: DISCONTINUED | COMMUNITY
Start: 2022-04-19 | End: 2024-06-05

## 2024-06-06 ENCOUNTER — APPOINTMENT (OUTPATIENT)
Dept: HEMATOLOGY ONCOLOGY | Facility: CLINIC | Age: 74
End: 2024-06-06
Payer: MEDICARE

## 2024-06-06 DIAGNOSIS — D64.9 ANEMIA, UNSPECIFIED: ICD-10-CM

## 2024-06-06 DIAGNOSIS — D75.839 THROMBOCYTOSIS, UNSPECIFIED: ICD-10-CM

## 2024-06-06 DIAGNOSIS — E61.1 IRON DEFICIENCY: ICD-10-CM

## 2024-06-06 DIAGNOSIS — D56.3 THALASSEMIA MINOR: ICD-10-CM

## 2024-06-06 PROCEDURE — 99213 OFFICE O/P EST LOW 20 MIN: CPT

## 2024-06-06 NOTE — PHYSICAL EXAM
[Normal] : affect appropriate [de-identified] : breathing appeared unlabored [de-identified] : coloration appeared normal

## 2024-06-06 NOTE — REASON FOR VISIT
[Follow-Up Visit] : a follow-up visit for [Home] : at home, [unfilled] , at the time of the visit. [Medical Office: ([facility])___] : at the medical office located in  [Patient] : the patient [Self] : self [FreeTextEntry2] : anemia

## 2024-06-06 NOTE — ASSESSMENT
[FreeTextEntry1] : Lab work reviewed. #1) Anemia/iron deficiency-h/o diverticulosis/diverticulitis with GI bleeding followed by surgery. In addition, prior episode of hematuria in 5/2021. No overt bleeding reported at present. Though thalassemia contributing to baseline anemia, had recommended GI follow-up for further GI evaluation-had EGD and colonoscopy 6/2022. Patient has deferred small bowel study due to h/o multiple prior bowel surgeries. With persistent anemia, and decrease in iron, patient wished for resumption of Feraheme-last dose 7/22/2022.  --encouraged compliance with PO iron for now. --Interval f/u lab work ordered  #2) History of thalassemia-beta thal. minor, with baseline hemoglobin ~10 per patient.  #3) history of chronic thrombocytosis-clinically have suspected represents a reactive process. 2021 PB TONO 2 mutation study normal; and CALR, MPL studies normal.  --DDx. has been reviewed with patient. Should hematologic scenario change/worsen, can consider further evaluation such as BMB, to r/o underlying evolving BM/Myeloproliferative disorder. Continue to monitor CBC and watch for consistent progressive rise in platelet count,  --Taking aspirin 81 mg PO QD.  Patient was given the opportunity to ask questions. Her questions have been answered to her apparent satisfaction at this time. She expressed her understanding and willingness to comply with follow-up.  -->Ferrous gluconate-1 tab PO QOD; RTC 4 months.

## 2024-06-06 NOTE — HISTORY OF PRESENT ILLNESS
[de-identified] : Had 1 year of "digestive issues." 9/2020-Colonoscopy revealed a stricture of benign intrinsic appearance in the sigmoid colon.  Diverticular disease was severe.  Diverticular associated colitis noted.   10/2020-CT scan abdomen/pelvis compared to 9/2020 showed persistent marked inflammation and wall thickening of the sigmoid colon in association with innumerable diverticuli consistent with diverticulitis.  The inflammation extended to the uterus and involved adjacent bowel loops.   11/2020-Patient underwent left colon and rectosigmoid anterior resection with pathology revealing diverticulosis with diverticulitis. Had pelvic abscess. Right adnexa, salpingo-oophorectomy performed with pathology revealing serositis.  Endometrial curettings negative for malignancy. 1/2021-Patient underwent closure of a diverting loop ileostomy which had been constructed to protect an at risk colorectal anastomosis done after resection of complicated diverticulitis. Post-op course complicated by blood loss , requiring 2 units PRBC transfusion.  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  However, noted an acute drop 6/3/2021 to a hemoglobin of 7.4.  Iron deficiency noted with a ferritin of 7. EGD/colonoscopy done 6/2022. PRN IV Feraheme.  [de-identified] : Saw cardiologist in Feb.-added a statin for cholesterol. Was bitten by a tic, so placed on course of Doxycycline. Having a stress test in near future-she states routine. Was taking ferrous iron QOD-began 10/2023, but stopped/inconsistent with it since Feb. Patient has no current complaints of chest pain, shortness of breath. No blood per rectum or melena reported, or vaginal bleeding. No fevers.  No lymph node complaints. Takes Metamucil daily per Dr. Marx.

## 2024-06-06 NOTE — CONSULT LETTER
Hgb 8.2    -start ferrous sulfate 325 mg po BID  -re-check in 1 month    Wife called [Dear  ___] : Dear  [unfilled], [Courtesy Letter:] : I had the pleasure of seeing your patient, [unfilled], in my office today. [Please see my note below.] : Please see my note below. [Sincerely,] : Sincerely, [Consult Closing:] : Thank you very much for allowing me to participate in the care of this patient.  If you have any questions, please do not hesitate to contact me. [FreeTextEntry3] : Court Hunt MD

## 2024-06-26 ENCOUNTER — APPOINTMENT (OUTPATIENT)
Dept: CARDIOLOGY | Facility: CLINIC | Age: 74
End: 2024-06-26

## 2024-07-11 DIAGNOSIS — M54.2 CERVICALGIA: ICD-10-CM

## 2024-08-01 ENCOUNTER — APPOINTMENT (OUTPATIENT)
Dept: RADIOLOGY | Facility: HOSPITAL | Age: 74
End: 2024-08-01
Payer: MEDICARE

## 2024-08-01 ENCOUNTER — APPOINTMENT (OUTPATIENT)
Dept: INTERNAL MEDICINE | Facility: CLINIC | Age: 74
End: 2024-08-01
Payer: MEDICARE

## 2024-08-01 ENCOUNTER — OUTPATIENT (OUTPATIENT)
Dept: OUTPATIENT SERVICES | Facility: HOSPITAL | Age: 74
LOS: 1 days | End: 2024-08-01
Payer: MEDICARE

## 2024-08-01 VITALS
HEIGHT: 67 IN | HEART RATE: 68 BPM | BODY MASS INDEX: 25.9 KG/M2 | RESPIRATION RATE: 16 BRPM | WEIGHT: 165 LBS | OXYGEN SATURATION: 98 % | TEMPERATURE: 97.5 F

## 2024-08-01 VITALS — DIASTOLIC BLOOD PRESSURE: 80 MMHG | SYSTOLIC BLOOD PRESSURE: 124 MMHG

## 2024-08-01 DIAGNOSIS — Z98.890 OTHER SPECIFIED POSTPROCEDURAL STATES: Chronic | ICD-10-CM

## 2024-08-01 DIAGNOSIS — M47.812 SPONDYLOSIS WITHOUT MYELOPATHY OR RADICULOPATHY, CERVICAL REGION: ICD-10-CM

## 2024-08-01 DIAGNOSIS — I87.2 VENOUS INSUFFICIENCY (CHRONIC) (PERIPHERAL): ICD-10-CM

## 2024-08-01 DIAGNOSIS — I10 ESSENTIAL (PRIMARY) HYPERTENSION: ICD-10-CM

## 2024-08-01 DIAGNOSIS — M47.812 SPONDYLOSIS W/OUT MYELOPATHY OR RADICULOPATHY, CERVICAL REGION: ICD-10-CM

## 2024-08-01 DIAGNOSIS — Z90.79 ACQUIRED ABSENCE OF OTHER GENITAL ORGAN(S): Chronic | ICD-10-CM

## 2024-08-01 DIAGNOSIS — E78.5 HYPERLIPIDEMIA, UNSPECIFIED: ICD-10-CM

## 2024-08-01 PROCEDURE — 99214 OFFICE O/P EST MOD 30 MIN: CPT

## 2024-08-01 PROCEDURE — 72040 X-RAY EXAM NECK SPINE 2-3 VW: CPT | Mod: 26

## 2024-08-01 PROCEDURE — 72040 X-RAY EXAM NECK SPINE 2-3 VW: CPT

## 2024-08-01 RX ORDER — MELOXICAM 15 MG/1
15 TABLET ORAL
Qty: 30 | Refills: 3 | Status: ACTIVE | COMMUNITY
Start: 2024-08-01 | End: 1900-01-01

## 2024-08-01 RX ORDER — METHOCARBAMOL 500 MG/1
500 TABLET, FILM COATED ORAL
Qty: 60 | Refills: 0 | Status: ACTIVE | COMMUNITY
Start: 2024-08-01 | End: 1900-01-01

## 2024-08-01 NOTE — HISTORY OF PRESENT ILLNESS
[FreeTextEntry1] : neck pain [de-identified] : neck pain raadiating up to head sometimes HA's seeing chiropractic tried moist heat  had bad habits sleepign on 2 pillows no paresthesias no weakness   sometimes frontal HA's  started on statin BB increased  bilat LE edema only in summer mild  i personally reviewed the results of ECHO NL LV FUNCTION ;    and Carotid US no change  pulsatile tinitus better now occ less frequent and less intense

## 2024-10-21 ENCOUNTER — APPOINTMENT (OUTPATIENT)
Dept: ORTHOPEDIC SURGERY | Facility: CLINIC | Age: 74
End: 2024-10-21
Payer: MEDICARE

## 2024-10-21 VITALS
WEIGHT: 165 LBS | HEIGHT: 67 IN | DIASTOLIC BLOOD PRESSURE: 80 MMHG | BODY MASS INDEX: 25.9 KG/M2 | HEART RATE: 68 BPM | SYSTOLIC BLOOD PRESSURE: 124 MMHG

## 2024-10-21 DIAGNOSIS — M17.11 UNILATERAL PRIMARY OSTEOARTHRITIS, RIGHT KNEE: ICD-10-CM

## 2024-10-21 PROCEDURE — 99204 OFFICE O/P NEW MOD 45 MIN: CPT | Mod: 25

## 2024-10-21 PROCEDURE — 20610 DRAIN/INJ JOINT/BURSA W/O US: CPT | Mod: RT

## 2024-10-21 PROCEDURE — 73564 X-RAY EXAM KNEE 4 OR MORE: CPT | Mod: RT

## 2024-10-23 PROBLEM — M17.11 PRIMARY LOCALIZED OSTEOARTHRITIS OF RIGHT KNEE: Status: ACTIVE | Noted: 2024-10-23

## 2024-10-25 NOTE — BRIEF OPERATIVE NOTE - OPERATION/FINDINGS
At Ex Lap, pelvic collection had been evacuated and min residual rind of tissue on inferior right postero-lateral cervical area. Right adnexa with dense scar to right PSW - removed. Left adnexa absent, but ~1.5 cm peritoneal tissue removed from left lateral aspect of post leaflet of broad lig under the round lig (well above the left ureter). Op site hemostatic. Given the findings, I felt hysterectomy was not indicated. D&C performed to exclude intrauterine pathology. Scant tissue obtained. FS- bgn.
Open anterior resection of sigmoid colon for diverticulitis and intramural abscess. Chronic inflammation to the bilateral adnexa and terminal ileum. R salpingoopherectomy and D&C performed by GYN. Stapled 31mm EEA anastamosis created with negative leak test by rigid sigmoidoscopy. Anastomosis at 12cm. Diverting loop ileostomy created.
never

## 2024-10-28 ENCOUNTER — APPOINTMENT (OUTPATIENT)
Dept: INTERNAL MEDICINE | Facility: CLINIC | Age: 74
End: 2024-10-28
Payer: MEDICARE

## 2024-10-28 VITALS — SYSTOLIC BLOOD PRESSURE: 126 MMHG | DIASTOLIC BLOOD PRESSURE: 76 MMHG

## 2024-10-28 VITALS
OXYGEN SATURATION: 99 % | BODY MASS INDEX: 25.43 KG/M2 | WEIGHT: 162 LBS | RESPIRATION RATE: 16 BRPM | HEART RATE: 54 BPM | HEIGHT: 67 IN | TEMPERATURE: 97.4 F

## 2024-10-28 DIAGNOSIS — K57.90 DIVERTICULOSIS OF INTESTINE, PART UNSPECIFIED, W/OUT PERFORATION OR ABSCESS W/OUT BLEEDING: ICD-10-CM

## 2024-10-28 DIAGNOSIS — D64.9 ANEMIA, UNSPECIFIED: ICD-10-CM

## 2024-10-28 DIAGNOSIS — Z00.00 ENCOUNTER FOR GENERAL ADULT MEDICAL EXAMINATION W/OUT ABNORMAL FINDINGS: ICD-10-CM

## 2024-10-28 PROCEDURE — G0439: CPT

## 2024-11-08 ENCOUNTER — LABORATORY RESULT (OUTPATIENT)
Age: 74
End: 2024-11-08

## 2024-11-08 LAB
25(OH)D3 SERPL-MCNC: 42 NG/ML
ALBUMIN SERPL ELPH-MCNC: 4.7 G/DL
ALP BLD-CCNC: 79 U/L
ALT SERPL-CCNC: 22 U/L
ANION GAP SERPL CALC-SCNC: 15 MMOL/L
APPEARANCE: CLEAR
AST SERPL-CCNC: 20 U/L
BACTERIA: NEGATIVE /HPF
BASOPHILS # BLD AUTO: 0.07 K/UL
BASOPHILS NFR BLD AUTO: 1 %
BILIRUB SERPL-MCNC: 0.7 MG/DL
BILIRUBIN URINE: NEGATIVE
BLOOD URINE: NEGATIVE
BUN SERPL-MCNC: 14 MG/DL
CALCIUM SERPL-MCNC: 9.7 MG/DL
CAST: 0 /LPF
CHLORIDE SERPL-SCNC: 94 MMOL/L
CHOLEST SERPL-MCNC: 156 MG/DL
CO2 SERPL-SCNC: 24 MMOL/L
COLOR: YELLOW
CREAT SERPL-MCNC: 0.58 MG/DL
CRP SERPL HS-MCNC: 3.26 MG/L
EGFR: 95 ML/MIN/1.73M2
EOSINOPHIL # BLD AUTO: 0.25 K/UL
EOSINOPHIL NFR BLD AUTO: 3.4 %
EPITHELIAL CELLS: 1 /HPF
ESTIMATED AVERAGE GLUCOSE: 123 MG/DL
FERRITIN SERPL-MCNC: 28 NG/ML
GLUCOSE QUALITATIVE U: NEGATIVE MG/DL
GLUCOSE SERPL-MCNC: 94 MG/DL
HBA1C MFR BLD HPLC: 5.9 %
HCT VFR BLD CALC: 34.1 %
HDLC SERPL-MCNC: 77 MG/DL
HGB BLD-MCNC: 10.7 G/DL
IMM GRANULOCYTES NFR BLD AUTO: 0.3 %
IRON SATN MFR SERPL: 20 %
IRON SERPL-MCNC: 80 UG/DL
KETONES URINE: NEGATIVE MG/DL
LDLC SERPL CALC-MCNC: 65 MG/DL
LEUKOCYTE ESTERASE URINE: NEGATIVE
LYMPHOCYTES # BLD AUTO: 1.21 K/UL
LYMPHOCYTES NFR BLD AUTO: 16.6 %
MAN DIFF?: NORMAL
MCHC RBC-ENTMCNC: 18.9 PG
MCHC RBC-ENTMCNC: 31.4 G/DL
MCV RBC AUTO: 60.2 FL
MICROSCOPIC-UA: NORMAL
MONOCYTES # BLD AUTO: 0.55 K/UL
MONOCYTES NFR BLD AUTO: 7.5 %
NEUTROPHILS # BLD AUTO: 5.19 K/UL
NEUTROPHILS NFR BLD AUTO: 71.2 %
NITRITE URINE: NEGATIVE
NONHDLC SERPL-MCNC: 80 MG/DL
PH URINE: 7
PLATELET # BLD AUTO: 383 K/UL
POTASSIUM SERPL-SCNC: 4.3 MMOL/L
PROT SERPL-MCNC: 7.2 G/DL
PROTEIN URINE: NEGATIVE MG/DL
RBC # BLD: 5.66 M/UL
RBC # BLD: 5.66 M/UL
RBC # FLD: 18.1 %
RED BLOOD CELLS URINE: 1 /HPF
RETICS # AUTO: 1 %
RETICS AGGREG/RBC NFR: 57.6 K/UL
SODIUM SERPL-SCNC: 133 MMOL/L
SPECIFIC GRAVITY URINE: 1.02
TIBC SERPL-MCNC: 393 UG/DL
TRIGL SERPL-MCNC: 75 MG/DL
TSH SERPL-ACNC: 2.86 UIU/ML
UIBC SERPL-MCNC: 313 UG/DL
UROBILINOGEN URINE: 0.2 MG/DL
WBC # FLD AUTO: 7.29 K/UL
WHITE BLOOD CELLS URINE: 0 /HPF

## 2024-11-11 ENCOUNTER — TRANSCRIPTION ENCOUNTER (OUTPATIENT)
Age: 74
End: 2024-11-11

## 2024-11-18 ENCOUNTER — OUTPATIENT (OUTPATIENT)
Dept: OUTPATIENT SERVICES | Facility: HOSPITAL | Age: 74
LOS: 1 days | Discharge: HOME | End: 2024-11-18

## 2024-11-18 DIAGNOSIS — Z90.721 ACQUIRED ABSENCE OF OVARIES, UNILATERAL: Chronic | ICD-10-CM

## 2024-11-18 DIAGNOSIS — Z90.79 ACQUIRED ABSENCE OF OTHER GENITAL ORGAN(S): Chronic | ICD-10-CM

## 2024-11-18 DIAGNOSIS — Z98.890 OTHER SPECIFIED POSTPROCEDURAL STATES: Chronic | ICD-10-CM

## 2024-11-18 DIAGNOSIS — D50.9 IRON DEFICIENCY ANEMIA, UNSPECIFIED: ICD-10-CM

## 2024-11-18 DIAGNOSIS — Z98.891 HISTORY OF UTERINE SCAR FROM PREVIOUS SURGERY: Chronic | ICD-10-CM

## 2024-11-21 ENCOUNTER — APPOINTMENT (OUTPATIENT)
Dept: HEMATOLOGY ONCOLOGY | Facility: CLINIC | Age: 74
End: 2024-11-21
Payer: MEDICARE

## 2024-11-21 DIAGNOSIS — E61.1 IRON DEFICIENCY: ICD-10-CM

## 2024-11-21 DIAGNOSIS — D75.839 THROMBOCYTOSIS, UNSPECIFIED: ICD-10-CM

## 2024-11-21 DIAGNOSIS — D56.3 THALASSEMIA MINOR: ICD-10-CM

## 2024-11-21 DIAGNOSIS — D64.9 ANEMIA, UNSPECIFIED: ICD-10-CM

## 2024-11-21 PROCEDURE — 99213 OFFICE O/P EST LOW 20 MIN: CPT

## 2024-12-05 NOTE — PROGRESS NOTE ADULT - ATTENDING COMMENTS
s/p LAR w/ resolving ileus  -Clears  -oob  -dvt ppx  -pain control
+ bowel function, matias fulls, no n/v    aaox3  abd soft, NT/ND    LRD today  OOB
+ ileostomy function, matias clears    aaox3  abd soft, NT/ND, incisions c/d/i    ileus resolving  start fulls
AWM: Pt seen in follow up 11/13/20 ~9a. Instructions discussed. All Q/A.
s/p LAR w/ ileus  -sips of clears  -oob  -dvt ppx  -pain control
no

## 2024-12-24 NOTE — PRE-ANESTHESIA EVALUATION ADULT - NSANTHOSAYNRD_GEN_A_CORE
No. ROBINSON screening performed.  STOP BANG Legend: 0-2 = LOW Risk; 3-4 = INTERMEDIATE Risk; 5-8 = HIGH Risk Writer calls to convey below message.   Pt's mother verbalizes understanding, agrees to plan of care, and denies further questions or concerns at this time.

## 2025-01-13 ENCOUNTER — APPOINTMENT (OUTPATIENT)
Dept: CARDIOLOGY | Facility: CLINIC | Age: 75
End: 2025-01-13
Payer: MEDICARE

## 2025-01-13 ENCOUNTER — NON-APPOINTMENT (OUTPATIENT)
Age: 75
End: 2025-01-13

## 2025-01-13 VITALS
BODY MASS INDEX: 25.43 KG/M2 | SYSTOLIC BLOOD PRESSURE: 130 MMHG | HEART RATE: 60 BPM | OXYGEN SATURATION: 99 % | HEIGHT: 67 IN | WEIGHT: 162 LBS | DIASTOLIC BLOOD PRESSURE: 72 MMHG

## 2025-01-13 DIAGNOSIS — H93.A3 PULSATILE TINNITUS, BILATERAL: ICD-10-CM

## 2025-01-13 DIAGNOSIS — E78.5 HYPERLIPIDEMIA, UNSPECIFIED: ICD-10-CM

## 2025-01-13 DIAGNOSIS — I10 ESSENTIAL (PRIMARY) HYPERTENSION: ICD-10-CM

## 2025-01-13 PROCEDURE — 99214 OFFICE O/P EST MOD 30 MIN: CPT

## 2025-01-13 PROCEDURE — 93000 ELECTROCARDIOGRAM COMPLETE: CPT

## 2025-01-13 NOTE — DIETITIAN INITIAL EVALUATION ADULT. - TIME FRAME
Ordering and Authorizing Provider:  Layne Bacon MD- [facility] - [facility] [place]      levothyroxine (Synthroid) 75 mcg tablet      [facility] - [place] [facility] - [facility]    90 day supply     No HP needed    7 months

## 2025-01-20 ENCOUNTER — APPOINTMENT (OUTPATIENT)
Dept: CARDIOLOGY | Facility: CLINIC | Age: 75
End: 2025-01-20

## 2025-01-22 ENCOUNTER — APPOINTMENT (OUTPATIENT)
Dept: OTOLARYNGOLOGY | Facility: CLINIC | Age: 75
End: 2025-01-22
Payer: MEDICARE

## 2025-01-22 DIAGNOSIS — Z82.2 FAMILY HISTORY OF DEAFNESS AND HEARING LOSS: ICD-10-CM

## 2025-01-22 DIAGNOSIS — H93.A2 PULSATILE TINNITUS, LEFT EAR: ICD-10-CM

## 2025-01-22 DIAGNOSIS — H93.19 TINNITUS, UNSPECIFIED EAR: ICD-10-CM

## 2025-01-22 PROCEDURE — 99203 OFFICE O/P NEW LOW 30 MIN: CPT

## 2025-01-27 ENCOUNTER — APPOINTMENT (OUTPATIENT)
Dept: CARDIOLOGY | Facility: CLINIC | Age: 75
End: 2025-01-27
Payer: MEDICARE

## 2025-01-27 PROCEDURE — 93015 CV STRESS TEST SUPVJ I&R: CPT

## 2025-02-06 ENCOUNTER — APPOINTMENT (OUTPATIENT)
Dept: ORTHOPEDIC SURGERY | Facility: CLINIC | Age: 75
End: 2025-02-06
Payer: MEDICARE

## 2025-02-06 VITALS — BODY MASS INDEX: 25.43 KG/M2 | WEIGHT: 162 LBS | HEIGHT: 67 IN

## 2025-02-06 DIAGNOSIS — M17.10 UNILATERAL PRIMARY OSTEOARTHRITIS, UNSPECIFIED KNEE: ICD-10-CM

## 2025-02-06 PROCEDURE — 99214 OFFICE O/P EST MOD 30 MIN: CPT | Mod: 25

## 2025-02-06 PROCEDURE — 20610 DRAIN/INJ JOINT/BURSA W/O US: CPT | Mod: RT

## 2025-02-06 PROCEDURE — 73564 X-RAY EXAM KNEE 4 OR MORE: CPT | Mod: RT

## 2025-05-10 ENCOUNTER — OUTPATIENT (OUTPATIENT)
Dept: OUTPATIENT SERVICES | Facility: HOSPITAL | Age: 75
LOS: 1 days | Discharge: HOME | End: 2025-05-10

## 2025-05-10 DIAGNOSIS — Z90.721 ACQUIRED ABSENCE OF OVARIES, UNILATERAL: Chronic | ICD-10-CM

## 2025-05-10 DIAGNOSIS — Z98.890 OTHER SPECIFIED POSTPROCEDURAL STATES: Chronic | ICD-10-CM

## 2025-05-10 DIAGNOSIS — Z90.79 ACQUIRED ABSENCE OF OTHER GENITAL ORGAN(S): Chronic | ICD-10-CM

## 2025-05-10 DIAGNOSIS — Z98.891 HISTORY OF UTERINE SCAR FROM PREVIOUS SURGERY: Chronic | ICD-10-CM

## 2025-05-10 DIAGNOSIS — D50.9 IRON DEFICIENCY ANEMIA, UNSPECIFIED: ICD-10-CM

## 2025-05-14 ENCOUNTER — APPOINTMENT (OUTPATIENT)
Dept: HEMATOLOGY ONCOLOGY | Facility: CLINIC | Age: 75
End: 2025-05-14
Payer: MEDICARE

## 2025-05-14 DIAGNOSIS — D56.3 THALASSEMIA MINOR: ICD-10-CM

## 2025-05-14 DIAGNOSIS — D75.839 THROMBOCYTOSIS, UNSPECIFIED: ICD-10-CM

## 2025-05-14 DIAGNOSIS — D64.9 ANEMIA, UNSPECIFIED: ICD-10-CM

## 2025-05-14 DIAGNOSIS — E61.1 IRON DEFICIENCY: ICD-10-CM

## 2025-05-14 PROCEDURE — 99213 OFFICE O/P EST LOW 20 MIN: CPT | Mod: 2W

## 2025-05-15 ENCOUNTER — NON-APPOINTMENT (OUTPATIENT)
Age: 75
End: 2025-05-15

## 2025-05-19 ENCOUNTER — APPOINTMENT (OUTPATIENT)
Dept: ORTHOPEDIC SURGERY | Facility: CLINIC | Age: 75
End: 2025-05-19
Payer: MEDICARE

## 2025-05-19 ENCOUNTER — NON-APPOINTMENT (OUTPATIENT)
Age: 75
End: 2025-05-19

## 2025-05-19 VITALS — WEIGHT: 164 LBS | HEIGHT: 67 IN | BODY MASS INDEX: 25.74 KG/M2

## 2025-05-19 DIAGNOSIS — M47.812 SPONDYLOSIS W/OUT MYELOPATHY OR RADICULOPATHY, CERVICAL REGION: ICD-10-CM

## 2025-05-19 PROCEDURE — 99214 OFFICE O/P EST MOD 30 MIN: CPT

## 2025-05-19 RX ORDER — DICLOFENAC SODIUM 75 MG/1
75 TABLET, DELAYED RELEASE ORAL
Qty: 60 | Refills: 1 | Status: ACTIVE | COMMUNITY
Start: 2025-05-19 | End: 1900-01-01

## 2025-06-02 ENCOUNTER — OUTPATIENT (OUTPATIENT)
Dept: OUTPATIENT SERVICES | Facility: HOSPITAL | Age: 75
LOS: 1 days | End: 2025-06-02
Payer: MEDICARE

## 2025-06-02 ENCOUNTER — APPOINTMENT (OUTPATIENT)
Dept: MRI IMAGING | Facility: CLINIC | Age: 75
End: 2025-06-02
Payer: MEDICARE

## 2025-06-02 DIAGNOSIS — Z90.721 ACQUIRED ABSENCE OF OVARIES, UNILATERAL: Chronic | ICD-10-CM

## 2025-06-02 DIAGNOSIS — Z98.891 HISTORY OF UTERINE SCAR FROM PREVIOUS SURGERY: Chronic | ICD-10-CM

## 2025-06-02 DIAGNOSIS — Z90.79 ACQUIRED ABSENCE OF OTHER GENITAL ORGAN(S): Chronic | ICD-10-CM

## 2025-06-02 DIAGNOSIS — Z98.890 OTHER SPECIFIED POSTPROCEDURAL STATES: Chronic | ICD-10-CM

## 2025-06-02 DIAGNOSIS — Z00.00 ENCOUNTER FOR GENERAL ADULT MEDICAL EXAMINATION WITHOUT ABNORMAL FINDINGS: ICD-10-CM

## 2025-06-02 PROCEDURE — 72141 MRI NECK SPINE W/O DYE: CPT

## 2025-06-02 PROCEDURE — 72141 MRI NECK SPINE W/O DYE: CPT | Mod: 26

## 2025-06-05 ENCOUNTER — APPOINTMENT (OUTPATIENT)
Dept: ORTHOPEDIC SURGERY | Facility: CLINIC | Age: 75
End: 2025-06-05
Payer: MEDICARE

## 2025-06-05 VITALS — WEIGHT: 164 LBS | HEIGHT: 67 IN | BODY MASS INDEX: 25.74 KG/M2

## 2025-06-05 DIAGNOSIS — M47.812 SPONDYLOSIS W/OUT MYELOPATHY OR RADICULOPATHY, CERVICAL REGION: ICD-10-CM

## 2025-06-05 PROCEDURE — 99214 OFFICE O/P EST MOD 30 MIN: CPT

## 2025-06-09 ENCOUNTER — TRANSCRIPTION ENCOUNTER (OUTPATIENT)
Age: 75
End: 2025-06-09

## 2025-07-11 ENCOUNTER — NON-APPOINTMENT (OUTPATIENT)
Age: 75
End: 2025-07-11

## 2025-07-18 ENCOUNTER — RX RENEWAL (OUTPATIENT)
Age: 75
End: 2025-07-18

## 2025-07-21 NOTE — PATIENT PROFILE ADULT - BRADEN SCORE
"[facility] NEUROLOGY  GENERAL NEUROLOGY  FOLLOW-UP VISIT    CC: chronic migraine w/o aura    INTERVAL HISTORY:  Estelita Gates is a 44 y.o. woman with chronic migraine without aura and a history otherwise notable for asthma.  I last saw her in the clinic on 7/15/2025 at the time of Botox administration.  At that time I recommended she continue Botox.  Today, she was unaccompanied, and she provided the following interval history:    The following is a summary of headache symptoms, presented in my standard format:     Family History: none  Age at onset (years): 38  Location: bi-temporal, bi-frontal, nose  Radiation: none  Frequency: baseline: daily, lately: 6/week  Duration: baseline: a couple of hours, lately: ~1-3 hours  Headache Days/Month: baseline: 30/30, lately: 25-30/30  Quality: baseline: \"throbbing,\" lately: \"pressure,\" like someone has a vice on her nose  Intensity: baseline: 7-8/10, lately: 4-8/10  Aura: none  Photophobia/Phonophobia/Nausea/Vomiting: yes/yes/yes/no  Provoked by Physical Activity?: not during exercise, headache often come on after exercise has just been completed  Triggers: caffeine, sleep deprivation, dehydration, summer heat, alcohol  Associated Symptoms: none  Autonomic Signs (such as ptosis, miosis, conjunctival injection, rhinorrhea, increased lacrimation): none  Head Trauma: none  Association with Menses: none  ED Visits: none  Hospitalizations: none  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  Sleep (hours/night): 6, tries to get more  Caffeine Intake: 1/day  Hydration: tries, probably usually effective  Nutrition: regular meals  Exercise: not much lately, s/p surgery early 7/2025  Analgesic Overuse:      Current Medication Regimen:  - CoQ 10: helpful  - ondansetron: doesn't use this often, no side effects  - riboflavin: helpful  - Ubrelvy: is effective, often re-doses  - Zavzpret: used this once, helpful, but \"burns\"    Medications Tried: Response  Preventive:  - " Aimovig: ineffective  - Ajovy: worked well x3 months but then lost effectiveness  - amitriptyline: associated with excessive fatigue  - Botox: reduces severity, questionable benefit  - duloxetine: probably ineffective, no side effects  - Emgality: helpful  - gabapentin: 600 mg TID, thought it helped, higher dosages associated with intolerable fatigue  - nortriptyline: effective, associated with fatigue (doses at night) and dry mouth   - Qulipta: ineffective  - topiramate: associate with nausea, malaise, fatigue, and hot flashes     Rescue:  - dexamethasone taper: helpful, but then headaches returned  - sumatriptan: 100 mg is effective but associated with intolerable fatigue, doesn't take this often  - rizatriptan: not particularly effective, no negative side effects  - naratriptan: effective, fewer side effects than sumatriptan  - Nurtec: worked for a while, then lost effectiveness    Medications Not Tried:  - Nurtec q48 hrs  - propranolol: not tried due to possible history of asthma  - valproic acid  - Vyepti    MEDICATIONS:  Current Outpatient Medications   Medication Sig    Zavegepant HCl (ZAVZPRET) 10 MG/ACT Solution One spray (10 mg/spray) in 1 nostril as a single dose. Maximum: One spray (10 mg) per 24 hours    Ubrogepant 100 MG Tab Take 100 mg at the onset of aura/headache; may re-dose x1 after 2 hrs if Headache persists; Max daily dose: 200 mg    Tirzepatide-Weight Management (ZEPBOUND SC) Inject 5 mg under the skin every 7 days. Patient states she usually takes on Tuesday.    gabapentin (NEURONTIN) 300 MG Cap Take 2 Capsules by mouth 3 times a day for 360 days.    Galcanezumab-gnlm (EMGALITY) 120 MG/ML Solution Auto-injector Inject 1 mL under the skin Q30 DAYS.    DULoxetine (CYMBALTA) 30 MG Cap DR Particles Take 2 Capsules by mouth every day.    magnesium oxide (MAG-OX) 400 MG Tab tablet Take 400 mg by mouth every day.    Riboflavin 400 MG Cap Take  by mouth every day.    B Complex-C (SUPER B COMPLEX PO)  Take 1 Tablet by mouth every day.    Cetirizine HCl (ZYRTEC ALLERGY PO) Take 1 Tablet by mouth every day.     MEDICAL, SOCIAL, AND FAMILY HISTORY:  There is no change in the patient's ROS or medical, social, or family histories since the previous visit on 7/15/2025.    REVIEW OF SYSTEMS:  A ROS was completed.  Pertinent positives and negatives were included in the HPI, above.  All other systems were reviewed and are negative.    PHYSICAL EXAM:  General/Medical:  - NAD    Neuro:  MENTAL STATUS: awake and alert; no deficits of speech or language; oriented to conversation; affect was appropriate to situation; pleasant, cooperative    CRANIAL NERVES:    II: acuity: NT, fields: NT, pupils: NT, discs: sharp    III/IV/VI: versions: grossly intact    V: facial sensation: NT    VII: facial expression: symmetric    VIII: hearing: intact to voice    IX/X: palate: NT    XI: shoulder shrug: NT    XII: tongue: NT    MOTOR:  - bulk: NT  - tone: NT  Upper Extremity Strength (R/L)    NT   Elbow flexion NT   Elbow extension NT   Shoulder abduction NT     Lower Extremity Strength  (R/L)   Hip flexion NT   Knee extension NT   Knee flexion NT   Ankle dorsiflexion NT   Ankle plantarflexion NT     - heel-walk: NT  - toe-walk: NT  - pronator drift: absent  - abnormal movements: none    SENSATION:  - light touch: NT  - vibration (R/L, seconds): NT at the great toes  - pinprick: NT  - proprioception: NT  - Romberg: NT    COORDINATION:  - finger to nose: NT  - finger tapping: NT    REFLEXES:  Reflex Right Left   BR NT NT   Biceps NT NT   Triceps NT NT   Patellae NT NT   Achilles NT NT   Toes NT NT     GAIT:  - NT    REVIEW OF IMAGING STUDIES:  No additional data since the last visit.    REVIEW OF LABORATORY STUDIES:  6/24/2025:  - CBC: WNL  - BMP: WNL    ASSESSMENT:  Estelita Gates is a 44 y.o. woman with chronic migraine w/o aura and a history otherwise notable for asthma.  Her headaches (which seem most consistent with chronic  migraine w/o aura) remain surprisingly difficult to control.  Plans/recommendations as follows:    PLAN:  Chronic Migraine w/o Aura:  Prevention:  - stop Botox  - stop Emgality  - stop duloxetine  - stop gabapentin  - trial of Vyepti  - if Vyepti is unattainable, will consider continuing Botox and resuming nortriptyline, or trying valproic acid, or propranolol  - get 7-9 hours of sleep per night; can try supplementing melatonin 2-10 mg, 2-3 hours before bedtime  - drink plenty of fluids (urine should be nearly clear)  - avoid excessive caffeine intake (no more than 2 servings per day and nothing in the afternoon)  - eat regular meals (don't skip meals)  - get moderate exercise (even just a 20 minute walk daily)    Rescue:  - continue Ubrelvy PRN  - continue Zavzpret PRN  - continue ondansetron 4 mg: take this at the onset of aura or headache to prevent or reduce nausea; may re-dose after 2 hours if headache or nausea persist; do not use more often than 3 days/week  - do not use analgesics (e.g., ibuprofen, acetaminophen) more than 2 days per week in order to avoid analgesic rebound headaches    - keep a headache log    - follow up MRI brain    Status Migrainosus:  - dexamethasone taper  - ketorolac 60 today    Follow-Up:  - Return in about 2 months (around 9/21/2025).    Signed: Willis Jones M.D.     18

## 2025-07-28 ENCOUNTER — APPOINTMENT (OUTPATIENT)
Dept: CARDIOLOGY | Facility: CLINIC | Age: 75
End: 2025-07-28
Payer: MEDICARE

## 2025-07-28 VITALS
BODY MASS INDEX: 25.74 KG/M2 | WEIGHT: 164 LBS | OXYGEN SATURATION: 99 % | DIASTOLIC BLOOD PRESSURE: 72 MMHG | HEART RATE: 60 BPM | RESPIRATION RATE: 16 BRPM | SYSTOLIC BLOOD PRESSURE: 132 MMHG | HEIGHT: 67 IN

## 2025-07-28 DIAGNOSIS — H93.A3 PULSATILE TINNITUS, BILATERAL: ICD-10-CM

## 2025-07-28 DIAGNOSIS — E78.5 HYPERLIPIDEMIA, UNSPECIFIED: ICD-10-CM

## 2025-07-28 DIAGNOSIS — I10 ESSENTIAL (PRIMARY) HYPERTENSION: ICD-10-CM

## 2025-07-28 PROCEDURE — 99214 OFFICE O/P EST MOD 30 MIN: CPT

## 2025-07-28 PROCEDURE — 93000 ELECTROCARDIOGRAM COMPLETE: CPT

## 2025-07-29 ENCOUNTER — APPOINTMENT (OUTPATIENT)
Dept: OTOLARYNGOLOGY | Facility: CLINIC | Age: 75
End: 2025-07-29
Payer: MEDICARE

## 2025-07-29 VITALS
HEIGHT: 67 IN | TEMPERATURE: 97 F | BODY MASS INDEX: 25.74 KG/M2 | WEIGHT: 164 LBS | HEART RATE: 54 BPM | OXYGEN SATURATION: 98 %

## 2025-07-29 DIAGNOSIS — H93.A2 PULSATILE TINNITUS, LEFT EAR: ICD-10-CM

## 2025-07-29 PROCEDURE — 99212 OFFICE O/P EST SF 10 MIN: CPT

## 2025-07-30 ENCOUNTER — NON-APPOINTMENT (OUTPATIENT)
Age: 75
End: 2025-07-30